# Patient Record
Sex: FEMALE | Race: WHITE | NOT HISPANIC OR LATINO | Employment: UNEMPLOYED | URBAN - NONMETROPOLITAN AREA
[De-identification: names, ages, dates, MRNs, and addresses within clinical notes are randomized per-mention and may not be internally consistent; named-entity substitution may affect disease eponyms.]

---

## 2018-02-12 PROCEDURE — 87624 HPV HI-RISK TYP POOLED RSLT: CPT | Performed by: NURSE PRACTITIONER

## 2018-02-12 PROCEDURE — 88142 CYTOPATH C/V THIN LAYER: CPT | Performed by: NURSE PRACTITIONER

## 2018-02-13 ENCOUNTER — LAB REQUISITION (OUTPATIENT)
Dept: LAB | Facility: HOSPITAL | Age: 33
End: 2018-02-13

## 2018-02-13 DIAGNOSIS — Z01.419 ENCOUNTER FOR GYNECOLOGICAL EXAMINATION WITHOUT ABNORMAL FINDING: ICD-10-CM

## 2018-02-27 LAB
LAB AP CASE REPORT: NORMAL
LAB AP GYN ADDITIONAL INFORMATION: NORMAL
LAB AP GYN OTHER FINDINGS: NORMAL
LAB AP LMP: NORMAL
Lab: NORMAL
PATH INTERP SPEC-IMP: NORMAL
STAT OF ADQ CVX/VAG CYTO-IMP: NORMAL

## 2018-03-01 LAB — HPV I/H RISK 4 DNA CVX QL PROBE+SIG AMP: NEGATIVE

## 2018-03-07 ENCOUNTER — OFFICE VISIT (OUTPATIENT)
Dept: OBSTETRICS AND GYNECOLOGY | Facility: CLINIC | Age: 33
End: 2018-03-07

## 2018-03-07 ENCOUNTER — LAB (OUTPATIENT)
Dept: LAB | Facility: HOSPITAL | Age: 33
End: 2018-03-07

## 2018-03-07 VITALS
BODY MASS INDEX: 43.07 KG/M2 | DIASTOLIC BLOOD PRESSURE: 86 MMHG | WEIGHT: 268 LBS | SYSTOLIC BLOOD PRESSURE: 138 MMHG | HEIGHT: 66 IN

## 2018-03-07 DIAGNOSIS — E66.01 OBESITY, MORBID, BMI 40.0-49.9 (HCC): ICD-10-CM

## 2018-03-07 DIAGNOSIS — Z30.09 STERILIZATION CONSULT: Primary | ICD-10-CM

## 2018-03-07 LAB — TSH SERPL DL<=0.05 MIU/L-ACNC: 1.96 MIU/ML (ref 0.46–4.68)

## 2018-03-07 PROCEDURE — 36415 COLL VENOUS BLD VENIPUNCTURE: CPT

## 2018-03-07 PROCEDURE — 84443 ASSAY THYROID STIM HORMONE: CPT

## 2018-03-07 PROCEDURE — 99203 OFFICE O/P NEW LOW 30 MIN: CPT | Performed by: OBSTETRICS & GYNECOLOGY

## 2018-03-07 NOTE — PROGRESS NOTES
Subjective   Mackenzie Neff is a 32 y.o. female.     HPI Comments: Patient presents today for a tubal consult, requests permanent sterilization.    Reiterated that sterilization should be considered permanent. Discussed risks for failure, bleeding, infection and possible damage to abdominal/pelvic structures.  Patient would like to proceed with Essure. Discussed need to have patient bring insurance card before we are able to sign paperwork.  Patient relates that she was recently seen at the [facility] department for plan B and Depo shot after unprotected intercourse 2 nights before.  LMP in , Depo was .  Discussed Phentermine with R/B/A. Counseled patient that I will not prescribe for second month if she does not lose weight as that would be an indication that risks outweigh benefits. Counseled patient that she will need to stop taking this medication for at least 2 wk before her sterilization.      Contraception   Pertinent negatives include no chest pain or coughing.   Weight Loss   Pertinent negatives include no chest pain or coughing.       The following portions of the patient's history were reviewed and updated as appropriate: allergies, current medications, past family history, past medical history, past social history, past surgical history and problem list.      Review of Systems   Constitutional: Positive for weight loss. Negative for unexpected weight change.   Respiratory: Negative for cough and shortness of breath.    Cardiovascular: Negative for chest pain and palpitations.       Objective   Physical Exam   Constitutional: She is oriented to person, place, and time. She appears well-developed and well-nourished. No distress.   HENT:   Head: Normocephalic and atraumatic.   Right Ear: External ear normal.   Left Ear: External ear normal.   Nose: Nose normal.   Mouth/Throat: Oropharynx is clear and moist.   Neurological: She is alert and oriented to person, place, and time.   Skin: She is  not diaphoretic.   Psychiatric: She has a normal mood and affect. Her behavior is normal. Judgment and thought content normal.   Vitals reviewed.      Assessment/Plan   Mackenzie was seen today for contraception and weight loss.    Diagnoses and all orders for this visit:    Sterilization consult    Obesity, morbid, BMI 40.0-49.9  -     TSH; Future       Check TSH  Phentermine 37.5 #30 with no refills script given  F/u 1 mo  10 lb weight loss goal

## 2018-03-14 ENCOUNTER — TELEPHONE (OUTPATIENT)
Dept: OBSTETRICS AND GYNECOLOGY | Facility: CLINIC | Age: 33
End: 2018-03-14

## 2018-03-14 NOTE — TELEPHONE ENCOUNTER
----- Message from Mackenzie Del Valle sent at 3/14/2018 11:47 AM CDT -----  Contact: [phone]  Pt was wondering about lab results from lab work from last week. Please call, thanks!  I called this patient and informed her that her TSH came back normal.

## 2018-11-28 ENCOUNTER — OFFICE VISIT (OUTPATIENT)
Dept: FAMILY MEDICINE CLINIC | Facility: CLINIC | Age: 33
End: 2018-11-28

## 2018-11-28 VITALS
DIASTOLIC BLOOD PRESSURE: 62 MMHG | HEIGHT: 66 IN | WEIGHT: 251.06 LBS | HEART RATE: 91 BPM | SYSTOLIC BLOOD PRESSURE: 120 MMHG | OXYGEN SATURATION: 98 % | BODY MASS INDEX: 40.35 KG/M2

## 2018-11-28 DIAGNOSIS — Z00.01 ANNUAL VISIT FOR GENERAL ADULT MEDICAL EXAMINATION WITH ABNORMAL FINDINGS: Primary | ICD-10-CM

## 2018-11-28 DIAGNOSIS — F90.0 ATTENTION DEFICIT HYPERACTIVITY DISORDER (ADHD), PREDOMINANTLY INATTENTIVE TYPE: ICD-10-CM

## 2018-11-28 PROCEDURE — 99203 OFFICE O/P NEW LOW 30 MIN: CPT | Performed by: FAMILY MEDICINE

## 2018-11-28 NOTE — PROGRESS NOTES
Subjective:     Mackenzie Neff is a 33 y.o. female who presents for initial evaluation  for ADHD    Patient is here for evaluation of ADHD and initiation of treatment. She reports that she has had poor sleep quality with difficulty falling asleep and staying asleep. She reports that even though she is up early she still is frequently late for work because of difficulty staying on task. She will frequently start a task and then become distracted with different tasks including at home and with work. She reports significant guilt and poor mood because her children will tell her information and she will have difficulty retaining and they have to repeat themselves. The severity and affect on her personal life is more significant after the separation from her spouse in June/July as she is now raising three children, working, and going to school and having difficulty managing all her responsibilities.  She reports that she was previously diagnosed with ADHD at the age of [age] by Dr. Olmstead in [facility] and was treated with ritalin with varying response to treatment in childhood. She reports that she has been diagnosed with Bipolar disorder but does not agree with that diagnosis. She has previously seen Caitie in the past.   Patient filled out a PHQ9, ADHD adult self assessment scale, and mood disorder screening form in office. Her depression and ADHD screen were significantly positive, and mood disorder screen was negative. Discussed with patient at length about importance of full evaluation with a psychologist for initiation of proper treatment. Patient experienced significant concern about initiation of any medication or administration of any vaccination.        Past Medical Hx:  Past Medical History:   Diagnosis Date   • Acneiform eruption    • Amenorrhea    • Anxiety    • Bipolar disorder (CMS/HCC)    • Tobacco dependence syndrome    • URI (upper respiratory infection)        Past Surgical Hx:  Past  Surgical History:   Procedure Laterality Date   • CERVIX SURGERY  2005    Revision of cervix (2)    incompetent cervix, cerclage    • DILATATION AND CURETTAGE     • PAP SMEAR      benign cellular changes reactive cellular changes. negative for intraepithelial lesion or malignancy       Health Maintenance:  Health Maintenance   Topic Date Due   • ANNUAL PHYSICAL  06/26/1989   • PNEUMOCOCCAL VACCINE (19-64 MEDIUM RISK) (1 of 1 - PPSV23) 06/26/2005   • TDAP/TD VACCINES (1 - Tdap) 06/26/2005   • INFLUENZA VACCINE  08/01/2018   • PAP SMEAR  02/12/2021       Current Meds:  No current outpatient medications on file.    Allergies:  Patient has no known allergies.    Family Hx:  Family History   Problem Relation Age of Onset   • Hypertension Mother    • Hypertension Father    • Lung cancer Paternal Grandfather    • Bipolar disorder Other    • Diabetes Other         Social History:  Social History     Socioeconomic History   • Marital status:      Spouse name: Not on file   • Number of children: Not on file   • Years of education: Not on file   • Highest education level: Not on file   Social Needs   • Financial resource strain: Not on file   • Food insecurity - worry: Not on file   • Food insecurity - inability: Not on file   • Transportation needs - medical: Not on file   • Transportation needs - non-medical: Not on file   Occupational History   • Not on file   Tobacco Use   • Smoking status: Current Every Day Smoker     Packs/day: 0.50     Years: 17.00     Pack years: 8.50     Types: Cigarettes   • Smokeless tobacco: Never Used   Substance and Sexual Activity   • Alcohol use: No   • Drug use: No   • Sexual activity: Yes     Partners: Male     Birth control/protection: Injection   Other Topics Concern   • Not on file   Social History Narrative   • Not on file       Review of Systems  Review of Systems   Constitutional: Negative for activity change, appetite change, fatigue and fever.   HENT: Negative for ear pain and  "sore throat.    Eyes: Negative for pain and visual disturbance.   Respiratory: Negative for cough and shortness of breath.    Cardiovascular: Negative for chest pain and palpitations.   Gastrointestinal: Negative for abdominal pain and nausea.   Endocrine: Negative for cold intolerance and heat intolerance.   Genitourinary: Negative for difficulty urinating and dysuria.   Musculoskeletal: Negative for arthralgias and gait problem.   Skin: Negative for color change and rash.   Neurological: Negative for dizziness, weakness and headaches.   Hematological: Negative for adenopathy. Does not bruise/bleed easily.   Psychiatric/Behavioral: Positive for decreased concentration and sleep disturbance. Negative for agitation, confusion, self-injury and suicidal ideas. The patient is nervous/anxious and is hyperactive.        Objective:     /62 (BP Location: Left arm, Patient Position: Sitting, Cuff Size: Large Adult)   Pulse 91   Ht 166.4 cm (65.5\")   Wt 114 kg (251 lb 1 oz)   SpO2 98%   BMI 41.14 kg/m²     Physical Exam   Constitutional: She is oriented to person, place, and time. She appears well-developed and well-nourished.   HENT:   Head: Normocephalic and atraumatic.   Eyes: Conjunctivae, EOM and lids are normal. Pupils are equal, round, and reactive to light.   Neck: Normal range of motion. Neck supple.   Cardiovascular: Normal rate, regular rhythm and normal heart sounds. Exam reveals no gallop and no friction rub.   No murmur heard.  Pulmonary/Chest: Effort normal and breath sounds normal.   Abdominal: Soft. Normal appearance and bowel sounds are normal. There is no tenderness.   Musculoskeletal: Normal range of motion.   Neurological: She is alert and oriented to person, place, and time.   Skin: Skin is warm, dry and intact.   Psychiatric: Judgment and thought content normal. Her mood appears anxious. Her speech is rapid and/or pressured. Her speech is not tangential and not slurred. She is hyperactive. " She is not actively hallucinating. Cognition and memory are normal. She is attentive.        Visual Acuity Screening    Right eye Left eye Both eyes   Without correction: 20/20 20/20 20/20   With correction:        Assessment/Plan:     Mackenzie was seen today for adhd.    Diagnoses and all orders for this visit:    Annual visit for general adult medical examination with abnormal findings    Attention deficit hyperactivity disorder (ADHD), predominantly inattentive type  -     Ambulatory Referral to Psychology           Follow-up:     No Follow-up on file.      Goals     None          Preventative:    Vaccines Recommended at this visit:   Influenza, Pneumovax 23, TDaP/TD and patient refused flu shot, requested information about the pneumovax 23, and was given a hard script for TDAP to be taken to the health department.    Screenings Recommended at this visit:  No Screenings offered today. Patient is up to date on all screenings at this time.     Smoking Status:  Patient is current smoker. Patient is not interested in smoking cessation.    Alcohol Intake:  Patient does not drink    Patient's Body mass index is 41.14 kg/m². BMI is above normal parameters. Recommendations include: exercise counseling, nutrition counseling and will be addressed more in depth at further visits..        RISK SCORE: 3      Signature:  Rachel Benito MD [facility] PGY3  [place] [address]  Office: [phone]          This document has been electronically signed by Rachel Benito MD on November 29, 2018 10:32 AM

## 2018-11-28 NOTE — PATIENT INSTRUCTIONS
Insomnia  Insomnia is a sleep disorder that makes it difficult to fall asleep or to stay asleep. Insomnia can cause tiredness (fatigue), low energy, difficulty concentrating, mood swings, and poor performance at work or school.  There are three different ways to classify insomnia:  · Difficulty falling asleep.  · Difficulty staying asleep.  · Waking up too early in the morning.    Any type of insomnia can be long-term (chronic) or short-term (acute). Both are common. Short-term insomnia usually lasts for three months or less. Chronic insomnia occurs at least three times a week for longer than three months.  What are the causes?  Insomnia may be caused by another condition, situation, or substance, such as:  · Anxiety.  · Certain medicines.  · Gastroesophageal reflux disease (GERD) or other gastrointestinal conditions.  · Asthma or other breathing conditions.  · Restless legs syndrome, sleep apnea, or other sleep disorders.  · Chronic pain.  · Menopause. This may include hot flashes.  · Stroke.  · Abuse of alcohol, tobacco, or illegal drugs.  · Depression.  · Caffeine.  · Neurological disorders, such as Alzheimer disease.  · An overactive thyroid (hyperthyroidism).    The cause of insomnia may not be known.  What increases the risk?  Risk factors for insomnia include:  · Gender. Women are more commonly affected than men.  · Age. Insomnia is more common as you get older.  · Stress. This may involve your professional or personal life.  · Income. Insomnia is more common in people with lower income.  · Lack of exercise.  · Irregular work schedule or night shifts.  · Traveling between different time zones.    What are the signs or symptoms?  If you have insomnia, trouble falling asleep or trouble staying asleep is the main symptom. This may lead to other symptoms, such as:  · Feeling fatigued.  · Feeling nervous about going to sleep.  · Not feeling rested in the morning.  · Having trouble concentrating.  · Feeling  irritable, anxious, or depressed.    How is this treated?  Treatment for insomnia depends on the cause. If your insomnia is caused by an underlying condition, treatment will focus on addressing the condition. Treatment may also include:  · Medicines to help you sleep.  · Counseling or therapy.  · Lifestyle adjustments.    Follow these instructions at home:  · Take medicines only as directed by your health care provider.  · Keep regular sleeping and waking hours. Avoid naps.  · Keep a sleep diary to help you and your health care provider figure out what could be causing your insomnia. Include:  ? When you sleep.  ? When you wake up during the night.  ? How well you sleep.  ? How rested you feel the next day.  ? Any side effects of medicines you are taking.  ? What you eat and drink.  · Make your bedroom a comfortable place where it is easy to fall asleep:  ? Put up shades or special blackout curtains to block light from outside.  ? Use a white noise machine to block noise.  ? Keep the temperature cool.  · Exercise regularly as directed by your health care provider. Avoid exercising right before bedtime.  · Use relaxation techniques to manage stress. Ask your health care provider to suggest some techniques that may work well for you. These may include:  ? Breathing exercises.  ? Routines to release muscle tension.  ? Visualizing peaceful scenes.  · Cut back on alcohol, caffeinated beverages, and cigarettes, especially close to bedtime. These can disrupt your sleep.  · Do not overeat or eat spicy foods right before bedtime. This can lead to digestive discomfort that can make it hard for you to sleep.  · Limit screen use before bedtime. This includes:  ? Watching TV.  ? Using your smartphone, tablet, and computer.  · Stick to a routine. This can help you fall asleep faster. Try to do a quiet activity, brush your teeth, and go to bed at the same time each night.  · Get out of bed if you are still awake after 15 minutes  of trying to sleep. Keep the lights down, but try reading or doing a quiet activity. When you feel sleepy, go back to bed.  · Make sure that you drive carefully. Avoid driving if you feel very sleepy.  · Keep all follow-up appointments as directed by your health care provider. This is important.  Contact a health care provider if:  · You are tired throughout the day or have trouble in your daily routine due to sleepiness.  · You continue to have sleep problems or your sleep problems get worse.  Get help right away if:  · You have serious thoughts about hurting yourself or someone else.  This information is not intended to replace advice given to you by your health care provider. Make sure you discuss any questions you have with your health care provider.  Document Released: 12/15/2001 Document Revised: 05/19/2017 Document Reviewed: 09/18/2015  [employer] Interactive Patient Education © 2018 [employer]  Pneumococcal Polysaccharide Vaccine: What You Need to Know  1. Why get vaccinated?  Vaccination can protect older adults (and some children and younger adults) from pneumococcal disease.  Pneumococcal disease is caused by bacteria that can spread from person to person through close contact. It can cause ear infections, and it can also lead to more serious infections of the:  · Lungs (pneumonia),  · Blood (bacteremia), and  · Covering of the brain and spinal cord (meningitis). Meningitis can cause deafness and brain damage, and it can be fatal.    Anyone can get pneumococcal disease, but children under 2 years of age, people with certain medical conditions, adults over 65 years of age, and cigarette smokers are at the highest risk.  About 18,000 older adults die each year from pneumococcal disease in the [place].  Treatment of pneumococcal infections with penicillin and other drugs used to be more effective. But some strains of the disease have become resistant to these drugs. This makes prevention of the  disease, through vaccination, even more important.  2. Pneumococcal polysaccharide vaccine (PPSV23)  Pneumococcal polysaccharide vaccine (PPSV23) protects against 23 types of pneumococcal bacteria. It will not prevent all pneumococcal disease.  PPSV23 is recommended for:  · All adults 65 years of age and older,  · Anyone 2 through 64 years of age with certain long-term health problems,  · Anyone 2 through 64 years of age with a weakened immune system,  · Adults 19 through 64 years of age who smoke cigarettes or have asthma.    Most people need only one dose of PPSV. A second dose is recommended for certain high-risk groups. People 65 and older should get a dose even if they have gotten one or more doses of the vaccine before they turned 65.  Your healthcare provider can give you more information about these recommendations.  Most healthy adults develop protection within 2 to 3 weeks of getting the shot.  3. Some people should not get this vaccine  · Anyone who has had a life-threatening allergic reaction to PPSV should not get another dose.  · Anyone who has a severe allergy to any component of PPSV should not receive it. Tell your provider if you have any severe allergies.  · Anyone who is moderately or severely ill when the shot is scheduled may be asked to wait until they recover before getting the vaccine. Someone with a mild illness can usually be vaccinated.  · Children less than 2 years of age should not receive this vaccine.  · There is no evidence that PPSV is harmful to either a pregnant woman or to her fetus. However, as a precaution, women who need the vaccine should be vaccinated before becoming pregnant, if possible.  4. Risks of a vaccine reaction  With any medicine, including vaccines, there is a chance of side effects. These are usually mild and go away on their own, but serious reactions are also possible.  About half of people who get PPSV have mild side effects, such as redness or pain where the  shot is given, which go away within about two days.  Less than 1 out of 100 people develop a fever, muscle aches, or more severe local reactions.  Problems that could happen after any vaccine:  · People sometimes faint after a medical procedure, including vaccination. Sitting or lying down for about 15 minutes can help prevent fainting, and injuries caused by a fall. Tell your doctor if you feel dizzy, or have vision changes or ringing in the ears.  · Some people get severe pain in the shoulder and have difficulty moving the arm where a shot was given. This happens very rarely.  · Any medication can cause a severe allergic reaction. Such reactions from a vaccine are very rare, estimated at about 1 in a million doses, and would happen within a few minutes to a few hours after the vaccination.  As with any medicine, there is a very remote chance of a vaccine causing a serious injury or death.  The safety of vaccines is always being monitored. For more information, visit: www.cdc.gov/vaccinesafety/  5. What if there is a serious reaction?  What should I look for?  Look for anything that concerns you, such as signs of a severe allergic reaction, very high fever, or unusual behavior.  Signs of a severe allergic reaction can include hives, swelling of the face and throat, difficulty breathing, a fast heartbeat, dizziness, and weakness. These would usually start a few minutes to a few hours after the vaccination.  What should I do?  If you think it is a severe allergic reaction or other emergency that can't wait, call 9-1-1 or get to the nearest hospital. Otherwise, call your doctor.  Afterward, the reaction should be reported to the Vaccine Adverse Event Reporting System (VAERS). Your doctor might file this report, or you can do it yourself through the VAERS web site at www.vaers.hhs.gov, or by calling 1-335.887.7024.  VAERS does not give medical advice.  6. How can I learn more?  · Ask your doctor. He or she can give you  the vaccine package insert or suggest other sources of information.  · Call your local or state health department.  · Contact the Centers for Disease Control and Prevention (CDC):  ? Call 1-933.632.2884 (6-450-VVG-INFO) or  ? Visit CDC's website at www.cdc.gov/vaccines  CDC Pneumococcal Polysaccharide Vaccine VIS (4/24/15)  This information is not intended to replace advice given to you by your health care provider. Make sure you discuss any questions you have with your health care provider.  Document Released: 10/15/2007 Document Revised: 09/07/2017 Document Reviewed: 09/07/2017  [employer] Interactive Patient Education © 2017 [employer]  Pneumococcal Conjugate Vaccine (PCV13) What You Need to Know  1. Why get vaccinated?  Vaccination can protect both children and adults from pneumococcal disease.  Pneumococcal disease is caused by bacteria that can spread from person to person through close contact. It can cause ear infections, and it can also lead to more serious infections of the:  · Lungs (pneumonia),  · Blood (bacteremia), and  · Covering of the brain and spinal cord (meningitis).    Pneumococcal pneumonia is most common among adults. Pneumococcal meningitis can cause deafness and brain damage, and it kills about 1 child in 10 who get it.  Anyone can get pneumococcal disease, but children under 2 years of age and adults 65 years and older, people with certain medical conditions, and cigarette smokers are at the highest risk.  Before there was a vaccine, the [place] saw:  · more than 700 cases of meningitis,  · about 13,000 blood infections,  · about 5 million ear infections, and  · about 200 deaths    in children under 5 each year from pneumococcal disease. Since vaccine became available, severe pneumococcal disease in these children has fallen by 88%.  About 18,000 older adults die of pneumococcal disease each year in the [place].  Treatment of pneumococcal infections with penicillin and other  drugs is not as effective as it used to be, because some strains of the disease have become resistant to these drugs. This makes prevention of the disease, through vaccination, even more important.  2. PCV13 vaccine  Pneumococcal conjugate vaccine (called PCV13) protects against 13 types of pneumococcal bacteria.  PCV13 is routinely given to children at 2, 4, 6, and 12-15 months of age. It is also recommended for children and adults 2 to 64 years of age with certain health conditions, and for all adults 65 years of age and older. Your doctor can give you details.  3. Some people should not get this vaccine  Anyone who has ever had a life-threatening allergic reaction to a dose of this vaccine, to an earlier pneumococcal vaccine called PCV7, or to any vaccine containing diphtheria toxoid (for example, DTaP), should not get PCV13.  Anyone with a severe allergy to any component of PCV13 should not get the vaccine. Tell your doctor if the person being vaccinated has any severe allergies.  If the person scheduled for vaccination is not feeling well, your healthcare provider might decide to reschedule the shot on another day.  4. Risks of a vaccine reaction  With any medicine, including vaccines, there is a chance of reactions. These are usually mild and go away on their own, but serious reactions are also possible.  Problems reported following PCV13 varied by age and dose in the series. The most common problems reported among children were:  · About half became drowsy after the shot, had a temporary loss of appetite, or had redness or tenderness where the shot was given.  · About 1 out of 3 had swelling where the shot was given.  · About 1 out of 3 had a mild fever, and about 1 in 20 had a fever over 102.2°F.  · Up to about 8 out of 10 became fussy or irritable.    Adults have reported pain, redness, and swelling where the shot was given; also mild fever, fatigue, headache, chills, or muscle pain.  Young children who get  PCV13 along with inactivated flu vaccine at the same time may be at increased risk for seizures caused by fever. Ask your doctor for more information.  Problems that could happen after any vaccine:  · People sometimes faint after a medical procedure, including vaccination. Sitting or lying down for about 15 minutes can help prevent fainting, and injuries caused by a fall. Tell your doctor if you feel dizzy, or have vision changes or ringing in the ears.  · Some older children and adults get severe pain in the shoulder and have difficulty moving the arm where a shot was given. This happens very rarely.  · Any medication can cause a severe allergic reaction. Such reactions from a vaccine are very rare, estimated at about 1 in a million doses, and would happen within a few minutes to a few hours after the vaccination.  As with any medicine, there is a very small chance of a vaccine causing a serious injury or death.  The safety of vaccines is always being monitored. For more information, visit: www.cdc.gov/vaccinesafety/  5. What if there is a serious reaction?  What should I look for?  Look for anything that concerns you, such as signs of a severe allergic reaction, very high fever, or unusual behavior.  Signs of a severe allergic reaction can include hives, swelling of the face and throat, difficulty breathing, a fast heartbeat, dizziness, and weakness--usually within a few minutes to a few hours after the vaccination.  What should I do?  · If you think it is a severe allergic reaction or other emergency that can’t wait, call 9-1-1 or get the person to the nearest hospital. Otherwise, call your doctor.  · Reactions should be reported to the Vaccine Adverse Event Reporting System (VAERS). Your doctor should file this report, or you can do it yourself through the VAERS web site at www.vaers.[facility].gov, or by calling 1-502.276.3613.  ? VAERS does not give medical advice.  6. The National Vaccine Injury Compensation  Program  The National Vaccine Injury Compensation Program (VICP) is a federal program that was created to compensate people who may have been injured by certain vaccines.  Persons who believe they may have been injured by a vaccine can learn about the program and about filing a claim by calling 1-664.735.9697 or visiting the VICP website at www.[facility].gov/vaccinecompensation. There is a time limit to file a claim for compensation.  7. How can I learn more?  · Ask your healthcare provider. He or she can give you the vaccine package insert or suggest other sources of information.  · Call your local or state health department.  · Contact the Centers for Disease Control and Prevention (CDC):  ? Call 1-855.413.6460 (5-587-KSW-INFO) or  ? Visit CDC's website at www.cdc.gov/vaccines  Vaccine Information Statement, PCV13 Vaccine (11/05/2015)  This information is not intended to replace advice given to you by your health care provider. Make sure you discuss any questions you have with your health care provider.  Document Released: 10/15/2007 Document Revised: 09/07/2017 Document Reviewed: 09/07/2017  [employer] Interactive Patient Education © 2017 [employer]

## 2018-12-29 ENCOUNTER — APPOINTMENT (OUTPATIENT)
Dept: CT IMAGING | Facility: HOSPITAL | Age: 33
End: 2018-12-29

## 2018-12-29 ENCOUNTER — HOSPITAL ENCOUNTER (EMERGENCY)
Facility: HOSPITAL | Age: 33
Discharge: HOME | End: 2018-12-29
Attending: EMERGENCY MEDICINE | Admitting: EMERGENCY MEDICINE

## 2018-12-29 VITALS
HEIGHT: 66 IN | BODY MASS INDEX: 39.86 KG/M2 | TEMPERATURE: 97.7 F | RESPIRATION RATE: 18 BRPM | SYSTOLIC BLOOD PRESSURE: 107 MMHG | HEART RATE: 82 BPM | OXYGEN SATURATION: 98 % | WEIGHT: 248 LBS | DIASTOLIC BLOOD PRESSURE: 61 MMHG

## 2018-12-29 DIAGNOSIS — R10.84 GENERALIZED ABDOMINAL PAIN: Primary | ICD-10-CM

## 2018-12-29 LAB
ALBUMIN SERPL-MCNC: 4.1 G/DL (ref 3.4–4.8)
ALBUMIN/GLOB SERPL: 1.4 G/DL (ref 1.1–1.8)
ALP SERPL-CCNC: 85 U/L (ref 38–126)
ALT SERPL W P-5'-P-CCNC: 21 U/L (ref 9–52)
ANION GAP SERPL CALCULATED.3IONS-SCNC: 9 MMOL/L (ref 5–15)
AST SERPL-CCNC: 22 U/L (ref 14–36)
B-HCG UR QL: NEGATIVE
BACTERIA UR QL AUTO: ABNORMAL /HPF
BASOPHILS # BLD AUTO: 0.02 10*3/MM3 (ref 0–0.2)
BASOPHILS NFR BLD AUTO: 0.2 % (ref 0–2)
BILIRUB SERPL-MCNC: 0.3 MG/DL (ref 0.2–1.3)
BILIRUB UR QL STRIP: NEGATIVE
BUN BLD-MCNC: 19 MG/DL (ref 7–21)
BUN/CREAT SERPL: 27.9 (ref 7–25)
CALCIUM SPEC-SCNC: 9.2 MG/DL (ref 8.4–10.2)
CHLORIDE SERPL-SCNC: 103 MMOL/L (ref 95–110)
CLARITY UR: ABNORMAL
CO2 SERPL-SCNC: 27 MMOL/L (ref 22–31)
COLOR UR: YELLOW
CREAT BLD-MCNC: 0.68 MG/DL (ref 0.5–1)
DEPRECATED RDW RBC AUTO: 40.6 FL (ref 36.4–46.3)
EOSINOPHIL # BLD AUTO: 0.18 10*3/MM3 (ref 0–0.7)
EOSINOPHIL NFR BLD AUTO: 1.8 % (ref 0–7)
ERYTHROCYTE [DISTWIDTH] IN BLOOD BY AUTOMATED COUNT: 12.9 % (ref 11.5–14.5)
GFR SERPL CREATININE-BSD FRML MDRD: 100 ML/MIN/1.73 (ref 64–149)
GLOBULIN UR ELPH-MCNC: 3 GM/DL (ref 2.3–3.5)
GLUCOSE BLD-MCNC: 105 MG/DL (ref 60–100)
GLUCOSE UR STRIP-MCNC: NEGATIVE MG/DL
HCT VFR BLD AUTO: 37.9 % (ref 35–45)
HGB BLD-MCNC: 13.4 G/DL (ref 12–15.5)
HGB UR QL STRIP.AUTO: ABNORMAL
HOLD SPECIMEN: NORMAL
HYALINE CASTS UR QL AUTO: ABNORMAL /LPF
IMM GRANULOCYTES # BLD AUTO: 0.03 10*3/MM3 (ref 0–0.02)
IMM GRANULOCYTES NFR BLD AUTO: 0.3 % (ref 0–0.5)
KETONES UR QL STRIP: NEGATIVE
LEUKOCYTE ESTERASE UR QL STRIP.AUTO: NEGATIVE
LYMPHOCYTES # BLD AUTO: 2.3 10*3/MM3 (ref 0.6–4.2)
LYMPHOCYTES NFR BLD AUTO: 22.9 % (ref 10–50)
MCH RBC QN AUTO: 30.3 PG (ref 26.5–34)
MCHC RBC AUTO-ENTMCNC: 35.4 G/DL (ref 31.4–36)
MCV RBC AUTO: 85.7 FL (ref 80–98)
MONOCYTES # BLD AUTO: 0.74 10*3/MM3 (ref 0–0.9)
MONOCYTES NFR BLD AUTO: 7.4 % (ref 0–12)
MUCOUS THREADS URNS QL MICRO: ABNORMAL /HPF
NEUTROPHILS # BLD AUTO: 6.79 10*3/MM3 (ref 2–8.6)
NEUTROPHILS NFR BLD AUTO: 67.4 % (ref 37–80)
NITRITE UR QL STRIP: NEGATIVE
PH UR STRIP.AUTO: 5.5 [PH] (ref 5–9)
PLATELET # BLD AUTO: 210 10*3/MM3 (ref 150–450)
PMV BLD AUTO: 10.4 FL (ref 8–12)
POTASSIUM BLD-SCNC: 4.2 MMOL/L (ref 3.5–5.1)
PROT SERPL-MCNC: 7.1 G/DL (ref 6.3–8.6)
PROT UR QL STRIP: NEGATIVE
RBC # BLD AUTO: 4.42 10*6/MM3 (ref 3.77–5.16)
RBC # UR: ABNORMAL /HPF
REF LAB TEST METHOD: ABNORMAL
SODIUM BLD-SCNC: 139 MMOL/L (ref 137–145)
SP GR UR STRIP: 1.03 (ref 1–1.03)
SQUAMOUS #/AREA URNS HPF: ABNORMAL /HPF
UROBILINOGEN UR QL STRIP: ABNORMAL
WBC NRBC COR # BLD: 10.06 10*3/MM3 (ref 3.2–9.8)
WBC UR QL AUTO: ABNORMAL /HPF
WHOLE BLOOD HOLD SPECIMEN: NORMAL

## 2018-12-29 PROCEDURE — 81025 URINE PREGNANCY TEST: CPT | Performed by: EMERGENCY MEDICINE

## 2018-12-29 PROCEDURE — 74176 CT ABD & PELVIS W/O CONTRAST: CPT

## 2018-12-29 PROCEDURE — 80053 COMPREHEN METABOLIC PANEL: CPT | Performed by: PHYSICIAN ASSISTANT

## 2018-12-29 PROCEDURE — 85025 COMPLETE CBC W/AUTO DIFF WBC: CPT | Performed by: PHYSICIAN ASSISTANT

## 2018-12-29 PROCEDURE — 99284 EMERGENCY DEPT VISIT MOD MDM: CPT

## 2018-12-29 PROCEDURE — 87086 URINE CULTURE/COLONY COUNT: CPT | Performed by: EMERGENCY MEDICINE

## 2018-12-29 PROCEDURE — 81001 URINALYSIS AUTO W/SCOPE: CPT | Performed by: EMERGENCY MEDICINE

## 2018-12-29 RX ORDER — ALUMINA, MAGNESIA, AND SIMETHICONE 2400; 2400; 240 MG/30ML; MG/30ML; MG/30ML
15 SUSPENSION ORAL ONCE
Status: COMPLETED | OUTPATIENT
Start: 2018-12-29 | End: 2018-12-29

## 2018-12-29 RX ORDER — ACETAMINOPHEN 500 MG
1000 TABLET ORAL ONCE
Status: COMPLETED | OUTPATIENT
Start: 2018-12-29 | End: 2018-12-29

## 2018-12-29 RX ORDER — HYDROCODONE BITARTRATE AND ACETAMINOPHEN 5; 325 MG/1; MG/1
1 TABLET ORAL ONCE
Status: DISCONTINUED | OUTPATIENT
Start: 2018-12-29 | End: 2018-12-29

## 2018-12-29 RX ORDER — ACETAMINOPHEN 500 MG
1000 TABLET ORAL EVERY 6 HOURS PRN
Qty: 56 TABLET | Refills: 0 | Status: SHIPPED | OUTPATIENT
Start: 2018-12-29 | End: 2019-01-05

## 2018-12-29 RX ADMIN — ACETAMINOPHEN 1000 MG: 500 TABLET ORAL at 09:56

## 2018-12-29 RX ADMIN — ALUMINUM HYDROXIDE, MAGNESIUM HYDROXIDE, AND DIMETHICONE 15 ML: 400; 400; 40 SUSPENSION ORAL at 13:39

## 2018-12-29 RX ADMIN — LIDOCAINE HYDROCHLORIDE 15 ML: 20 SOLUTION ORAL; TOPICAL at 13:39

## 2018-12-30 LAB — BACTERIA SPEC AEROBE CULT: NORMAL

## 2019-01-30 ENCOUNTER — OFFICE VISIT (OUTPATIENT)
Dept: FAMILY MEDICINE CLINIC | Facility: CLINIC | Age: 34
End: 2019-01-30

## 2019-01-30 VITALS
HEART RATE: 81 BPM | HEIGHT: 66 IN | SYSTOLIC BLOOD PRESSURE: 120 MMHG | BODY MASS INDEX: 40.18 KG/M2 | WEIGHT: 250 LBS | OXYGEN SATURATION: 99 % | DIASTOLIC BLOOD PRESSURE: 82 MMHG

## 2019-01-30 DIAGNOSIS — E66.01 CLASS 3 SEVERE OBESITY WITHOUT SERIOUS COMORBIDITY WITH BODY MASS INDEX (BMI) OF 40.0 TO 44.9 IN ADULT, UNSPECIFIED OBESITY TYPE (HCC): Primary | ICD-10-CM

## 2019-01-30 DIAGNOSIS — T14.8XXA BRUISE: ICD-10-CM

## 2019-01-30 PROCEDURE — 99213 OFFICE O/P EST LOW 20 MIN: CPT | Performed by: FAMILY MEDICINE

## 2019-01-30 RX ORDER — NAPROXEN 500 MG/1
TABLET ORAL
Qty: 60 TABLET | Refills: 2 | Status: ON HOLD | OUTPATIENT
Start: 2019-01-30 | End: 2020-05-04
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
## 2019-06-08 ENCOUNTER — APPOINTMENT (OUTPATIENT)
Dept: CT IMAGING | Facility: HOSPITAL | Age: 34
End: 2019-06-08

## 2019-06-08 ENCOUNTER — HOSPITAL ENCOUNTER (EMERGENCY)
Facility: HOSPITAL | Age: 34
Discharge: HOME | End: 2019-06-08
Attending: FAMILY MEDICINE | Admitting: FAMILY MEDICINE

## 2019-06-08 VITALS
OXYGEN SATURATION: 99 % | HEART RATE: 89 BPM | HEIGHT: 66 IN | BODY MASS INDEX: 33.75 KG/M2 | TEMPERATURE: 97.8 F | DIASTOLIC BLOOD PRESSURE: 59 MMHG | RESPIRATION RATE: 18 BRPM | SYSTOLIC BLOOD PRESSURE: 114 MMHG | WEIGHT: 210 LBS

## 2019-06-08 DIAGNOSIS — R11.0 NAUSEA: ICD-10-CM

## 2019-06-08 DIAGNOSIS — R42 DIZZINESS: Primary | ICD-10-CM

## 2019-06-08 LAB
ALBUMIN SERPL-MCNC: 4.2 G/DL (ref 3.5–5.2)
ALBUMIN/GLOB SERPL: 1.3 G/DL
ALP SERPL-CCNC: 80 U/L (ref 39–117)
ALT SERPL W P-5'-P-CCNC: 16 U/L (ref 1–33)
AMPHET+METHAMPHET UR QL: POSITIVE
ANION GAP SERPL CALCULATED.3IONS-SCNC: 11 MMOL/L
AST SERPL-CCNC: 14 U/L (ref 1–32)
B-HCG UR QL: NEGATIVE
BACTERIA UR QL AUTO: ABNORMAL /HPF
BARBITURATES UR QL SCN: NEGATIVE
BASOPHILS # BLD AUTO: 0.01 10*3/MM3 (ref 0–0.2)
BASOPHILS NFR BLD AUTO: 0.1 % (ref 0–1.5)
BENZODIAZ UR QL SCN: NEGATIVE
BILIRUB SERPL-MCNC: 0.5 MG/DL (ref 0.2–1.2)
BILIRUB UR QL STRIP: NEGATIVE
BUN BLD-MCNC: 13 MG/DL (ref 6–20)
BUN/CREAT SERPL: 16.9 (ref 7–25)
CALCIUM SPEC-SCNC: 9.3 MG/DL (ref 8.6–10.5)
CANNABINOIDS SERPL QL: NEGATIVE
CHLORIDE SERPL-SCNC: 102 MMOL/L (ref 98–107)
CLARITY UR: CLEAR
CO2 SERPL-SCNC: 26 MMOL/L (ref 22–29)
COCAINE UR QL: NEGATIVE
COLOR UR: YELLOW
CREAT BLD-MCNC: 0.77 MG/DL (ref 0.57–1)
D-DIMER, QUANTITATIVE (MAD,POW, STR): 469 NG/ML (FEU) (ref 0–470)
DEPRECATED RDW RBC AUTO: 39.6 FL (ref 37–54)
EOSINOPHIL # BLD AUTO: 0.19 10*3/MM3 (ref 0–0.4)
EOSINOPHIL NFR BLD AUTO: 2 % (ref 0.3–6.2)
ERYTHROCYTE [DISTWIDTH] IN BLOOD BY AUTOMATED COUNT: 12.8 % (ref 12.3–15.4)
GFR SERPL CREATININE-BSD FRML MDRD: 87 ML/MIN/1.73
GLOBULIN UR ELPH-MCNC: 3.3 GM/DL
GLUCOSE BLD-MCNC: 111 MG/DL (ref 65–99)
GLUCOSE BLDC GLUCOMTR-MCNC: 135 MG/DL (ref 70–130)
GLUCOSE UR STRIP-MCNC: NEGATIVE MG/DL
HBA1C MFR BLD: 4.5 % (ref 4.8–5.6)
HCT VFR BLD AUTO: 39.8 % (ref 34–46.6)
HGB BLD-MCNC: 13.8 G/DL (ref 12–15.9)
HGB UR QL STRIP.AUTO: NEGATIVE
HOLD SPECIMEN: NORMAL
HOLD SPECIMEN: NORMAL
HYALINE CASTS UR QL AUTO: ABNORMAL /LPF
IMM GRANULOCYTES # BLD AUTO: 0.02 10*3/MM3 (ref 0–0.05)
IMM GRANULOCYTES NFR BLD AUTO: 0.2 % (ref 0–0.5)
KETONES UR QL STRIP: NEGATIVE
LEUKOCYTE ESTERASE UR QL STRIP.AUTO: ABNORMAL
LIPASE SERPL-CCNC: 15 U/L (ref 13–60)
LYMPHOCYTES # BLD AUTO: 1.93 10*3/MM3 (ref 0.7–3.1)
LYMPHOCYTES NFR BLD AUTO: 20.5 % (ref 19.6–45.3)
MAGNESIUM SERPL-MCNC: 2.2 MG/DL (ref 1.6–2.6)
MCH RBC QN AUTO: 29.8 PG (ref 26.6–33)
MCHC RBC AUTO-ENTMCNC: 34.7 G/DL (ref 31.5–35.7)
MCV RBC AUTO: 86 FL (ref 79–97)
METHADONE UR QL SCN: NEGATIVE
MONOCYTES # BLD AUTO: 0.64 10*3/MM3 (ref 0.1–0.9)
MONOCYTES NFR BLD AUTO: 6.8 % (ref 5–12)
NEUTROPHILS # BLD AUTO: 6.61 10*3/MM3 (ref 1.7–7)
NEUTROPHILS NFR BLD AUTO: 70.4 % (ref 42.7–76)
NITRITE UR QL STRIP: NEGATIVE
NRBC BLD AUTO-RTO: 0 /100 WBC (ref 0–0.2)
OPIATES UR QL: NEGATIVE
OXYCODONE UR QL SCN: NEGATIVE
PH UR STRIP.AUTO: 8.5 [PH] (ref 5–9)
PLATELET # BLD AUTO: 228 10*3/MM3 (ref 140–450)
PMV BLD AUTO: 10.7 FL (ref 6–12)
POTASSIUM BLD-SCNC: 3.7 MMOL/L (ref 3.5–5.2)
PROT SERPL-MCNC: 7.5 G/DL (ref 6–8.5)
PROT UR QL STRIP: NEGATIVE
RBC # BLD AUTO: 4.63 10*6/MM3 (ref 3.77–5.28)
RBC # UR: ABNORMAL /HPF
REF LAB TEST METHOD: ABNORMAL
SODIUM BLD-SCNC: 139 MMOL/L (ref 136–145)
SP GR UR STRIP: 1.01 (ref 1–1.03)
SQUAMOUS #/AREA URNS HPF: ABNORMAL /HPF
TSH SERPL DL<=0.05 MIU/L-ACNC: 0.03 MIU/ML (ref 0.27–4.2)
UROBILINOGEN UR QL STRIP: ABNORMAL
WBC NRBC COR # BLD: 9.4 10*3/MM3 (ref 3.4–10.8)
WBC UR QL AUTO: ABNORMAL /HPF
WHOLE BLOOD HOLD SPECIMEN: NORMAL
WHOLE BLOOD HOLD SPECIMEN: NORMAL

## 2019-06-08 PROCEDURE — 80307 DRUG TEST PRSMV CHEM ANLYZR: CPT | Performed by: NURSE PRACTITIONER

## 2019-06-08 PROCEDURE — 83735 ASSAY OF MAGNESIUM: CPT | Performed by: NURSE PRACTITIONER

## 2019-06-08 PROCEDURE — 84436 ASSAY OF TOTAL THYROXINE: CPT | Performed by: NURSE PRACTITIONER

## 2019-06-08 PROCEDURE — 85379 FIBRIN DEGRADATION QUANT: CPT | Performed by: NURSE PRACTITIONER

## 2019-06-08 PROCEDURE — 83036 HEMOGLOBIN GLYCOSYLATED A1C: CPT | Performed by: NURSE PRACTITIONER

## 2019-06-08 PROCEDURE — 80050 GENERAL HEALTH PANEL: CPT | Performed by: FAMILY MEDICINE

## 2019-06-08 PROCEDURE — 81001 URINALYSIS AUTO W/SCOPE: CPT | Performed by: NURSE PRACTITIONER

## 2019-06-08 PROCEDURE — 81025 URINE PREGNANCY TEST: CPT | Performed by: NURSE PRACTITIONER

## 2019-06-08 PROCEDURE — 93005 ELECTROCARDIOGRAM TRACING: CPT | Performed by: NURSE PRACTITIONER

## 2019-06-08 PROCEDURE — 82962 GLUCOSE BLOOD TEST: CPT

## 2019-06-08 PROCEDURE — 99283 EMERGENCY DEPT VISIT LOW MDM: CPT

## 2019-06-08 PROCEDURE — 83690 ASSAY OF LIPASE: CPT | Performed by: NURSE PRACTITIONER

## 2019-06-08 PROCEDURE — 93010 ELECTROCARDIOGRAM REPORT: CPT | Performed by: INTERNAL MEDICINE

## 2019-06-08 RX ORDER — MECLIZINE HYDROCHLORIDE 25 MG/1
25 TABLET ORAL 4 TIMES DAILY PRN
Qty: 12 TABLET | Refills: 0 | Status: SHIPPED | OUTPATIENT
Start: 2019-06-08 | End: 2019-06-11

## 2019-06-08 RX ORDER — SODIUM CHLORIDE 0.9 % (FLUSH) 0.9 %
10 SYRINGE (ML) INJECTION AS NEEDED
Status: DISCONTINUED | OUTPATIENT
Start: 2019-06-08 | End: 2019-06-08 | Stop reason: HOSPADM

## 2019-06-08 RX ORDER — DEXTROAMPHETAMINE SACCHARATE, AMPHETAMINE ASPARTATE MONOHYDRATE, DEXTROAMPHETAMINE SULFATE AND AMPHETAMINE SULFATE 6.25; 6.25; 6.25; 6.25 MG/1; MG/1; MG/1; MG/1
CAPSULE, EXTENDED RELEASE ORAL
Refills: 0 | Status: ON HOLD | COMMUNITY
Start: 2019-03-13 | End: 2020-05-04

## 2019-06-08 RX ORDER — ONDANSETRON 4 MG/1
4 TABLET, ORALLY DISINTEGRATING ORAL EVERY 8 HOURS PRN
Qty: 15 TABLET | Refills: 0 | Status: SHIPPED | OUTPATIENT
Start: 2019-06-08 | End: 2020-04-25 | Stop reason: SDUPTHER

## 2019-06-08 RX ADMIN — SODIUM CHLORIDE 1000 ML: 900 INJECTION, SOLUTION INTRAVENOUS at 11:00

## 2019-06-08 NOTE — ED NOTES
"Pt refused IVF & requested IV to be removed. Pt stated that she did not need an IV or IVF, that she is not dehydrated. Pt states \"I drink plenty at work. I mainly drink energy drinks, but\". I explained to pt that consuming excessive amounts of energy drinks is not good for her.      Chilo Freeman RN  06/08/19 1107       Chilo Freeman RN  06/08/19 1111    "

## 2019-06-08 NOTE — ED NOTES
"Pt came up to this tech and asked if she could have any candy or something to eat because \"she feels terrible\" and \"is worried that it will trigger her anxiety.\" Pt informed that it would be best to wait until we check her blood sugar in the back before she ate/drank anything.      Jose Calle  06/08/19 [id number]    "

## 2019-06-08 NOTE — ED NOTES
"PT stated that she has been having problems regulating her blood sugar. Pt stated that \"she has been dealing with it herself usually but she has been unable to bring her blood sugar level up.\" Pt did not check her blood sugar at home but she stated that \"she has been feeling shaky, fatigued, dizzy, extreme thirst, and unable to comprehend much\". Pt also stated that \"when her blood sugar drops it triggers her anxiety. Pt also thinks that her crazy work hours is why her blood sugar has been bottoming out.\" Pt family has History of Diabetes.        LucJose BRIAN  06/08/19 [id number]    "

## 2019-06-08 NOTE — ED PROVIDER NOTES
"Subjective   33-year-old female in the emergency department today complaining of dizziness, low blood sugar and feeling of near syncope.  Patient reports she has blood sugar problems in the past but does not have a glucometer to check her numbers.  \"I know my body\".  Patient has a family history of her mother and grandfather having diabetes.        History provided by:  Patient  History limited by:  Age   used: No        Review of Systems   Constitutional: Negative for fatigue.   HENT: Negative for congestion.    Respiratory: Negative for shortness of breath.    Cardiovascular: Negative for chest pain and palpitations.   Gastrointestinal: Positive for nausea. Negative for abdominal pain, diarrhea and vomiting.   Genitourinary: Negative for dysuria and pelvic pain.   Musculoskeletal: Negative for arthralgias.   Skin: Negative for wound.   Allergic/Immunologic: Negative for immunocompromised state.   Neurological: Positive for dizziness, syncope (near ) and weakness.   Hematological: Negative for adenopathy.   Psychiatric/Behavioral: Positive for decreased concentration. Negative for confusion.   All other systems reviewed and are negative.      Past Medical History:   Diagnosis Date   • Acneiform eruption    • Amenorrhea    • Anxiety    • Bipolar disorder (CMS/HCC)    • Tobacco dependence syndrome    • URI (upper respiratory infection)        No Known Allergies    Past Surgical History:   Procedure Laterality Date   • CERVIX SURGERY  2005    Revision of cervix (2)    incompetent cervix, cerclage    • DILATATION AND CURETTAGE     • PAP SMEAR      benign cellular changes reactive cellular changes. negative for intraepithelial lesion or malignancy       Family History   Problem Relation Age of Onset   • Hypertension Mother    • Hypertension Father    • Lung cancer Paternal Grandfather    • Bipolar disorder Other    • Diabetes Other        Social History     Socioeconomic History   • Marital status: " Single     Spouse name: Not on file   • Number of children: Not on file   • Years of education: Not on file   • Highest education level: Not on file   Tobacco Use   • Smoking status: Current Every Day Smoker     Packs/day: 0.50     Years: 17.00     Pack years: 8.50     Types: Cigarettes   • Smokeless tobacco: Never Used   Substance and Sexual Activity   • Alcohol use: No   • Drug use: No   • Sexual activity: Yes     Partners: Male     Birth control/protection: Injection           Objective   Physical Exam   Constitutional: She is oriented to person, place, and time. Vital signs are normal. She appears well-developed and well-nourished.   HENT:   Head: Normocephalic.   Nose: Nose normal.   Eyes: Conjunctivae are normal. Pupils are equal, round, and reactive to light.   Neck: Normal range of motion.   Cardiovascular: Normal rate, regular rhythm and normal heart sounds.   Pulmonary/Chest: Effort normal and breath sounds normal.   Abdominal: Soft.   Musculoskeletal: Normal range of motion.   Neurological: She is alert and oriented to person, place, and time. GCS eye subscore is 4. GCS verbal subscore is 5. GCS motor subscore is 6.   Skin: Skin is warm and dry.   Psychiatric: She has a normal mood and affect.   Nursing note and vitals reviewed.      Procedures           ED Course      CT Head Without Contrast    (Results Pending)     Results for orders placed or performed during the hospital encounter of 06/08/19   Comprehensive Metabolic Panel   Result Value Ref Range    Glucose 111 (H) 65 - 99 mg/dL    BUN 13 6 - 20 mg/dL    Creatinine 0.77 0.57 - 1.00 mg/dL    Sodium 139 136 - 145 mmol/L    Potassium 3.7 3.5 - 5.2 mmol/L    Chloride 102 98 - 107 mmol/L    CO2 26.0 22.0 - 29.0 mmol/L    Calcium 9.3 8.6 - 10.5 mg/dL    Total Protein 7.5 6.0 - 8.5 g/dL    Albumin 4.20 3.50 - 5.20 g/dL    ALT (SGPT) 16 1 - 33 U/L    AST (SGOT) 14 1 - 32 U/L    Alkaline Phosphatase 80 39 - 117 U/L    Total Bilirubin 0.5 0.2 - 1.2 mg/dL     eGFR Non African Amer 87 >60 mL/min/1.73    Globulin 3.3 gm/dL    A/G Ratio 1.3 g/dL    BUN/Creatinine Ratio 16.9 7.0 - 25.0    Anion Gap 11.0 mmol/L   CBC Auto Differential   Result Value Ref Range    WBC 9.40 3.40 - 10.80 10*3/mm3    RBC 4.63 3.77 - 5.28 10*6/mm3    Hemoglobin 13.8 12.0 - 15.9 g/dL    Hematocrit 39.8 34.0 - 46.6 %    MCV 86.0 79.0 - 97.0 fL    MCH 29.8 26.6 - 33.0 pg    MCHC 34.7 31.5 - 35.7 g/dL    RDW 12.8 12.3 - 15.4 %    RDW-SD 39.6 37.0 - 54.0 fl    MPV 10.7 6.0 - 12.0 fL    Platelets 228 140 - 450 10*3/mm3    Neutrophil % 70.4 42.7 - 76.0 %    Lymphocyte % 20.5 19.6 - 45.3 %    Monocyte % 6.8 5.0 - 12.0 %    Eosinophil % 2.0 0.3 - 6.2 %    Basophil % 0.1 0.0 - 1.5 %    Immature Grans % 0.2 0.0 - 0.5 %    Neutrophils, Absolute 6.61 1.70 - 7.00 10*3/mm3    Lymphocytes, Absolute 1.93 0.70 - 3.10 10*3/mm3    Monocytes, Absolute 0.64 0.10 - 0.90 10*3/mm3    Eosinophils, Absolute 0.19 0.00 - 0.40 10*3/mm3    Basophils, Absolute 0.01 0.00 - 0.20 10*3/mm3    Immature Grans, Absolute 0.02 0.00 - 0.05 10*3/mm3    nRBC 0.0 0.0 - 0.2 /100 WBC   Urinalysis With Microscopic If Indicated (No Culture) - Urine, Clean Catch   Result Value Ref Range    Color, UA Yellow Yellow, Straw, Dark Yellow, Ines    Appearance, UA Clear Clear    pH, UA 8.5 5.0 - 9.0    Specific [place], UA 1.015 1.003 - 1.030    Glucose, UA Negative Negative    Ketones, UA Negative Negative    Bilirubin, UA Negative Negative    Blood, UA Negative Negative    Protein, UA Negative Negative    Leuk Esterase, UA Trace (A) Negative    Nitrite, UA Negative Negative    Urobilinogen, UA 0.2 E.U./dL 0.2 - 1.0 E.U./dL   D-dimer, Quantitative   Result Value Ref Range    D-Dimer, Quantitative 469 0 - 470 ng/mL (FEU)   Urine Drug Screen - Urine, Clean Catch   Result Value Ref Range    Amphet/Methamphet, Screen Positive (A) Negative    Barbiturates Screen, Urine Negative Negative    Benzodiazepine Screen, Urine Negative Negative    Cocaine Screen,  Urine Negative Negative    Opiate Screen Negative Negative    THC, Screen, Urine Negative Negative    Methadone Screen, Urine Negative Negative    Oxycodone Screen, Urine Negative Negative   Magnesium   Result Value Ref Range    Magnesium 2.2 1.6 - 2.6 mg/dL   TSH   Result Value Ref Range    TSH 0.025 (L) 0.270 - 4.200 mIU/mL   Lipase   Result Value Ref Range    Lipase 15 13 - 60 U/L   Pregnancy, Urine - Urine, Clean Catch   Result Value Ref Range    HCG, Urine QL Negative Negative   Hemoglobin A1c   Result Value Ref Range    Hemoglobin A1C 4.50 (L) 4.80 - 5.60 %   Urinalysis, Microscopic Only - Urine, Clean Catch   Result Value Ref Range    RBC, UA None Seen None Seen /HPF    WBC, UA 6-12 (A) None Seen, 0-2, 3-5 /HPF    Bacteria, UA None Seen None Seen /HPF    Squamous Epithelial Cells, UA Too Numerous to Count (A) None Seen, 0-2 /HPF    Hyaline Casts, UA None Seen None Seen /LPF    Methodology Automated Microscopy    POC Glucose Once   Result Value Ref Range    Glucose 135 (H) 70 - 130 mg/dL   Light Blue Top   Result Value Ref Range    Extra Tube hold for add-on    Green Top (Gel)   Result Value Ref Range    Extra Tube Hold for add-ons.    Lavender Top   Result Value Ref Range    Extra Tube hold for add-on    Gold Top - SST   Result Value Ref Range    Extra Tube Hold for add-ons.                  MDM  Number of Diagnoses or Management Options  Nausea and vomiting, intractability of vomiting not specified, unspecified vomiting type:         Final diagnoses:   Dizziness   Nausea            Chappell, Yao SMITH, APRN  06/08/19 1148

## 2019-06-10 LAB — T4 SERPL-MCNC: 6.8 UG/DL (ref 4.5–12)

## 2020-04-25 ENCOUNTER — HOSPITAL ENCOUNTER (EMERGENCY)
Facility: HOSPITAL | Age: 35
Discharge: HOME | End: 2020-04-25
Attending: EMERGENCY MEDICINE | Admitting: EMERGENCY MEDICINE

## 2020-04-25 VITALS
HEIGHT: 68 IN | WEIGHT: 215 LBS | TEMPERATURE: 98.2 F | BODY MASS INDEX: 32.58 KG/M2 | HEART RATE: 102 BPM | OXYGEN SATURATION: 94 % | SYSTOLIC BLOOD PRESSURE: 107 MMHG | DIASTOLIC BLOOD PRESSURE: 73 MMHG | RESPIRATION RATE: 17 BRPM

## 2020-04-25 DIAGNOSIS — B15.9 VIRAL HEPATITIS A WITHOUT HEPATIC COMA: Primary | ICD-10-CM

## 2020-04-25 LAB
ALBUMIN SERPL-MCNC: 4.1 G/DL (ref 3.5–5.2)
ALBUMIN/GLOB SERPL: 1.2 G/DL
ALP SERPL-CCNC: 203 U/L (ref 39–117)
ALT SERPL W P-5'-P-CCNC: 2116 U/L (ref 1–33)
ANION GAP SERPL CALCULATED.3IONS-SCNC: 14 MMOL/L (ref 5–15)
APAP SERPL-MCNC: <5 MCG/ML (ref 10–30)
AST SERPL-CCNC: 1962 U/L (ref 1–32)
BACTERIA UR QL AUTO: ABNORMAL /HPF
BASOPHILS # BLD AUTO: 0.03 10*3/MM3 (ref 0–0.2)
BASOPHILS NFR BLD AUTO: 0.6 % (ref 0–1.5)
BILIRUB SERPL-MCNC: 3 MG/DL (ref 0.2–1.2)
BILIRUB UR QL STRIP: ABNORMAL
BUN BLD-MCNC: 9 MG/DL (ref 6–20)
BUN/CREAT SERPL: 14.1 (ref 7–25)
CALCIUM SPEC-SCNC: 9.4 MG/DL (ref 8.6–10.5)
CHLORIDE SERPL-SCNC: 101 MMOL/L (ref 98–107)
CLARITY UR: CLEAR
CLUMPED PLATELETS: PRESENT
CO2 SERPL-SCNC: 27 MMOL/L (ref 22–29)
COLOR UR: ABNORMAL
CREAT BLD-MCNC: 0.64 MG/DL (ref 0.57–1)
DEPRECATED RDW RBC AUTO: 38.3 FL (ref 37–54)
EOSINOPHIL # BLD AUTO: 0.06 10*3/MM3 (ref 0–0.4)
EOSINOPHIL NFR BLD AUTO: 1.1 % (ref 0.3–6.2)
ERYTHROCYTE [DISTWIDTH] IN BLOOD BY AUTOMATED COUNT: 12.3 % (ref 12.3–15.4)
GFR SERPL CREATININE-BSD FRML MDRD: 107 ML/MIN/1.73
GLOBULIN UR ELPH-MCNC: 3.3 GM/DL
GLUCOSE BLD-MCNC: 97 MG/DL (ref 65–99)
GLUCOSE UR STRIP-MCNC: NEGATIVE MG/DL
HAV IGM SERPL QL IA: REACTIVE
HBV CORE IGM SERPL QL IA: ABNORMAL
HBV SURFACE AG SERPL QL IA: ABNORMAL
HCG SERPL QL: NEGATIVE
HCT VFR BLD AUTO: 43.6 % (ref 34–46.6)
HCV AB SER DONR QL: ABNORMAL
HGB BLD-MCNC: 14.9 G/DL (ref 12–15.9)
HGB UR QL STRIP.AUTO: NEGATIVE
HOLD SPECIMEN: NORMAL
HYALINE CASTS UR QL AUTO: ABNORMAL /LPF
IMM GRANULOCYTES # BLD AUTO: 0.03 10*3/MM3 (ref 0–0.05)
IMM GRANULOCYTES NFR BLD AUTO: 0.6 % (ref 0–0.5)
KETONES UR QL STRIP: NEGATIVE
LEUKOCYTE ESTERASE UR QL STRIP.AUTO: ABNORMAL
LIPASE SERPL-CCNC: 22 U/L (ref 13–60)
LYMPHOCYTES # BLD AUTO: 1.72 10*3/MM3 (ref 0.7–3.1)
LYMPHOCYTES NFR BLD AUTO: 32.5 % (ref 19.6–45.3)
MCH RBC QN AUTO: 29.3 PG (ref 26.6–33)
MCHC RBC AUTO-ENTMCNC: 34.2 G/DL (ref 31.5–35.7)
MCV RBC AUTO: 85.7 FL (ref 79–97)
MONOCYTES # BLD AUTO: 0.45 10*3/MM3 (ref 0.1–0.9)
MONOCYTES NFR BLD AUTO: 8.5 % (ref 5–12)
NEUTROPHILS # BLD AUTO: 3.01 10*3/MM3 (ref 1.7–7)
NEUTROPHILS NFR BLD AUTO: 56.7 % (ref 42.7–76)
NITRITE UR QL STRIP: NEGATIVE
NRBC BLD AUTO-RTO: 0 /100 WBC (ref 0–0.2)
PH UR STRIP.AUTO: 6 [PH] (ref 5–9)
PLATELET # BLD AUTO: 132 10*3/MM3 (ref 140–450)
PMV BLD AUTO: 10.7 FL (ref 6–12)
POIKILOCYTOSIS BLD QL SMEAR: NORMAL
POTASSIUM BLD-SCNC: 3.9 MMOL/L (ref 3.5–5.2)
PROT SERPL-MCNC: 7.4 G/DL (ref 6–8.5)
PROT UR QL STRIP: NEGATIVE
RBC # BLD AUTO: 5.09 10*6/MM3 (ref 3.77–5.28)
RBC # UR: ABNORMAL /HPF
REF LAB TEST METHOD: ABNORMAL
SMALL PLATELETS BLD QL SMEAR: NORMAL
SODIUM BLD-SCNC: 142 MMOL/L (ref 136–145)
SP GR UR STRIP: 1.01 (ref 1–1.03)
SQUAMOUS #/AREA URNS HPF: ABNORMAL /HPF
UROBILINOGEN UR QL STRIP: ABNORMAL
WBC MORPH BLD: NORMAL
WBC NRBC COR # BLD: 5.3 10*3/MM3 (ref 3.4–10.8)
WBC UR QL AUTO: ABNORMAL /HPF
WHOLE BLOOD HOLD SPECIMEN: NORMAL

## 2020-04-25 PROCEDURE — 25010000002 PROMETHAZINE PER 50 MG: Performed by: EMERGENCY MEDICINE

## 2020-04-25 PROCEDURE — 85007 BL SMEAR W/DIFF WBC COUNT: CPT | Performed by: EMERGENCY MEDICINE

## 2020-04-25 PROCEDURE — 63710000001 ONDANSETRON ODT 4 MG TABLET DISPERSIBLE: Performed by: EMERGENCY MEDICINE

## 2020-04-25 PROCEDURE — 83690 ASSAY OF LIPASE: CPT | Performed by: EMERGENCY MEDICINE

## 2020-04-25 PROCEDURE — 80307 DRUG TEST PRSMV CHEM ANLYZR: CPT | Performed by: EMERGENCY MEDICINE

## 2020-04-25 PROCEDURE — 81001 URINALYSIS AUTO W/SCOPE: CPT

## 2020-04-25 PROCEDURE — 80074 ACUTE HEPATITIS PANEL: CPT | Performed by: EMERGENCY MEDICINE

## 2020-04-25 PROCEDURE — 80053 COMPREHEN METABOLIC PANEL: CPT | Performed by: EMERGENCY MEDICINE

## 2020-04-25 PROCEDURE — 84703 CHORIONIC GONADOTROPIN ASSAY: CPT | Performed by: EMERGENCY MEDICINE

## 2020-04-25 PROCEDURE — 96361 HYDRATE IV INFUSION ADD-ON: CPT

## 2020-04-25 PROCEDURE — 96374 THER/PROPH/DIAG INJ IV PUSH: CPT

## 2020-04-25 PROCEDURE — 99283 EMERGENCY DEPT VISIT LOW MDM: CPT

## 2020-04-25 PROCEDURE — 85025 COMPLETE CBC W/AUTO DIFF WBC: CPT | Performed by: EMERGENCY MEDICINE

## 2020-04-25 RX ORDER — ONDANSETRON 4 MG/1
4 TABLET, FILM COATED ORAL EVERY 6 HOURS PRN
Qty: 20 TABLET | Refills: 0 | Status: SHIPPED | OUTPATIENT
Start: 2020-04-25 | End: 2020-07-08

## 2020-04-25 RX ORDER — ONDANSETRON 4 MG/1
4 TABLET, ORALLY DISINTEGRATING ORAL ONCE
Status: COMPLETED | OUTPATIENT
Start: 2020-04-25 | End: 2020-04-25

## 2020-04-25 RX ORDER — DEXTROSE AND SODIUM CHLORIDE 5; .9 G/100ML; G/100ML
1000 INJECTION, SOLUTION INTRAVENOUS ONCE
Status: COMPLETED | OUTPATIENT
Start: 2020-04-25 | End: 2020-04-25

## 2020-04-25 RX ORDER — PROMETHAZINE HYDROCHLORIDE 25 MG/ML
12.5 INJECTION, SOLUTION INTRAMUSCULAR; INTRAVENOUS ONCE
Status: COMPLETED | OUTPATIENT
Start: 2020-04-25 | End: 2020-04-25

## 2020-04-25 RX ORDER — SODIUM CHLORIDE 0.9 % (FLUSH) 0.9 %
10 SYRINGE (ML) INJECTION AS NEEDED
Status: DISCONTINUED | OUTPATIENT
Start: 2020-04-25 | End: 2020-04-25 | Stop reason: HOSPADM

## 2020-04-25 RX ADMIN — ONDANSETRON 4 MG: 4 TABLET, ORALLY DISINTEGRATING ORAL at 03:28

## 2020-04-25 RX ADMIN — DEXTROSE AND SODIUM CHLORIDE 1000 ML/HR: 5; 900 INJECTION, SOLUTION INTRAVENOUS at 01:22

## 2020-04-25 RX ADMIN — PROMETHAZINE HYDROCHLORIDE 12.5 MG: 25 INJECTION INTRAMUSCULAR; INTRAVENOUS at 01:22

## 2020-04-25 NOTE — ED TRIAGE NOTES
Pt. Presents to ED with nausea and drowsiness. Pt. Ambulates to ED. AAOX4. Airway patent. Breathing adequate and non-labored. No active bleeding

## 2020-04-25 NOTE — ED PROVIDER NOTES
Subjective   34-year-old female presents with nausea and vomiting for past 2 to 3 days.  Patient reports she has been very fatigued for past 3 days and has had nausea with several episodes of vomiting.  Reports mild diffuse abdominal pain.  Denies any diarrhea or constipation.  Denies any drug or alcohol use.  Reports prior history of hypoglycemia. She is not a diabetic          Review of Systems   Constitutional: Positive for fatigue. Negative for chills and fever.   HENT: Negative for drooling, rhinorrhea and voice change.    Respiratory: Negative for cough, shortness of breath and wheezing.    Cardiovascular: Negative for chest pain, palpitations and leg swelling.   Gastrointestinal: Positive for nausea and vomiting. Negative for abdominal pain, constipation and diarrhea.   Genitourinary: Negative for dysuria and hematuria.   Musculoskeletal: Negative for back pain, myalgias, neck pain and neck stiffness.   Skin: Negative for pallor and rash.   Neurological: Negative for dizziness, syncope, weakness and headaches.       Past Medical History:   Diagnosis Date   • Acneiform eruption    • Amenorrhea    • Anxiety    • Bipolar disorder (CMS/HCC)    • Tobacco dependence syndrome    • URI (upper respiratory infection)        No Known Allergies    Past Surgical History:   Procedure Laterality Date   • CERVIX SURGERY  2005    Revision of cervix (2)    incompetent cervix, cerclage    • DILATATION AND CURETTAGE     • PAP SMEAR      benign cellular changes reactive cellular changes. negative for intraepithelial lesion or malignancy       Family History   Problem Relation Age of Onset   • Hypertension Mother    • Hypertension Father    • Lung cancer Paternal Grandfather    • Bipolar disorder Other    • Diabetes Other        Social History     Socioeconomic History   • Marital status: Single     Spouse name: Not on file   • Number of children: Not on file   • Years of education: Not on file   • Highest education level: Not on  file   Tobacco Use   • Smoking status: Current Every Day Smoker     Packs/day: 0.50     Years: 17.00     Pack years: 8.50     Types: Cigarettes   • Smokeless tobacco: Never Used   Substance and Sexual Activity   • Alcohol use: No   • Drug use: No   • Sexual activity: Yes     Partners: Male     Birth control/protection: Injection           Objective   Physical Exam   Constitutional: She is oriented to person, place, and time. She appears well-developed and well-nourished. No distress.   HENT:   Head: Normocephalic and atraumatic.   Eyes: Pupils are equal, round, and reactive to light. EOM are normal. Right eye exhibits no discharge. Left eye exhibits no discharge.   Neck: Normal range of motion. No JVD present.   Cardiovascular: Normal rate, regular rhythm, normal heart sounds and intact distal pulses. Exam reveals no gallop and no friction rub.   No murmur heard.  Pulmonary/Chest: Effort normal and breath sounds normal. She has no wheezes. She has no rales.   Abdominal: Soft. She exhibits no distension and no mass. There is no tenderness. There is no guarding.   Musculoskeletal: Normal range of motion. She exhibits no edema or deformity.   Lymphadenopathy:     She has no cervical adenopathy.   Neurological: She is alert and oriented to person, place, and time. No cranial nerve deficit. She exhibits normal muscle tone.   Skin: Skin is warm and dry. Capillary refill takes less than 2 seconds. She is not diaphoretic. No erythema.   Nursing note and vitals reviewed.      Procedures           ED Course                 Results for orders placed or performed during the hospital encounter of 04/25/20   Urinalysis With Culture If Indicated - Urine, Clean Catch   Result Value Ref Range    Color, UA Dark Yellow Yellow, Straw, Dark Yellow, Ines    Appearance, UA Clear Clear    pH, UA 6.0 5.0 - 9.0    Specific [place], UA 1.009 1.003 - 1.030    Glucose, UA Negative Negative    Ketones, UA Negative Negative    Bilirubin, UA  Small (1+) (A) Negative    Blood, UA Negative Negative    Protein, UA Negative Negative    Leuk Esterase, UA Trace (A) Negative    Nitrite, UA Negative Negative    Urobilinogen, UA 1.0 E.U./dL 0.2 - 1.0 E.U./dL   Urinalysis, Microscopic Only - Urine, Clean Catch   Result Value Ref Range    RBC, UA 0-2 (A) None Seen /HPF    WBC, UA 3-5 None Seen, 0-2, 3-5 /HPF    Bacteria, UA None Seen None Seen /HPF    Squamous Epithelial Cells, UA 3-5 (A) None Seen, 0-2 /HPF    Hyaline Casts, UA 0-2 None Seen /LPF    Methodology Automated Microscopy    Comprehensive Metabolic Panel   Result Value Ref Range    Glucose 97 65 - 99 mg/dL    BUN 9 6 - 20 mg/dL    Creatinine 0.64 0.57 - 1.00 mg/dL    Sodium 142 136 - 145 mmol/L    Potassium 3.9 3.5 - 5.2 mmol/L    Chloride 101 98 - 107 mmol/L    CO2 27.0 22.0 - 29.0 mmol/L    Calcium 9.4 8.6 - 10.5 mg/dL    Total Protein 7.4 6.0 - 8.5 g/dL    Albumin 4.10 3.50 - 5.20 g/dL    ALT (SGPT) 2,116 (H) 1 - 33 U/L    AST (SGOT) 1,962 (H) 1 - 32 U/L    Alkaline Phosphatase 203 (H) 39 - 117 U/L    Total Bilirubin 3.0 (H) 0.2 - 1.2 mg/dL    eGFR Non African Amer 107 >60 mL/min/1.73    Globulin 3.3 gm/dL    A/G Ratio 1.2 g/dL    BUN/Creatinine Ratio 14.1 7.0 - 25.0    Anion Gap 14.0 5.0 - 15.0 mmol/L   Lipase   Result Value Ref Range    Lipase 22 13 - 60 U/L   hCG, Serum, Qualitative   Result Value Ref Range    HCG Qualitative Negative Negative   CBC Auto Differential   Result Value Ref Range    WBC 5.30 3.40 - 10.80 10*3/mm3    RBC 5.09 3.77 - 5.28 10*6/mm3    Hemoglobin 14.9 12.0 - 15.9 g/dL    Hematocrit 43.6 34.0 - 46.6 %    MCV 85.7 79.0 - 97.0 fL    MCH 29.3 26.6 - 33.0 pg    MCHC 34.2 31.5 - 35.7 g/dL    RDW 12.3 12.3 - 15.4 %    RDW-SD 38.3 37.0 - 54.0 fl    MPV 10.7 6.0 - 12.0 fL    Platelets 132 (L) 140 - 450 10*3/mm3    Neutrophil % 56.7 42.7 - 76.0 %    Lymphocyte % 32.5 19.6 - 45.3 %    Monocyte % 8.5 5.0 - 12.0 %    Eosinophil % 1.1 0.3 - 6.2 %    Basophil % 0.6 0.0 - 1.5 %    Immature  Grans % 0.6 (H) 0.0 - 0.5 %    Neutrophils, Absolute 3.01 1.70 - 7.00 10*3/mm3    Lymphocytes, Absolute 1.72 0.70 - 3.10 10*3/mm3    Monocytes, Absolute 0.45 0.10 - 0.90 10*3/mm3    Eosinophils, Absolute 0.06 0.00 - 0.40 10*3/mm3    Basophils, Absolute 0.03 0.00 - 0.20 10*3/mm3    Immature Grans, Absolute 0.03 0.00 - 0.05 10*3/mm3    nRBC 0.0 0.0 - 0.2 /100 WBC   Scan Slide   Result Value Ref Range    Poikilocytes Slight/1+ None Seen    WBC Morphology Normal Normal    Platelet Estimate Decreased Normal    Clumped Platelets Present None Seen   Acetaminophen Level   Result Value Ref Range    Acetaminophen <5.0 (L) 10.0 - 30.0 mcg/mL   Hepatitis Panel, Acute   Result Value Ref Range    Hepatitis B Surface Ag Non-Reactive Non-Reactive    Hep A IgM Reactive (A) Non-Reactive    Hep B C IgM Non-Reactive Non-Reactive    Hepatitis C Ab Non-Reactive Non-Reactive   Light Blue Top   Result Value Ref Range    Extra Tube hold for add-on      No Radiology Exams Resulted Within Past 24 Hours                               MDM  Number of Diagnoses or Management Options  Diagnosis management comments: Patient with acute hepatitis secondary to hepatitis A infection.  She is not actively vomiting, feels better after treatment.  Is comfortable with discharge home with close follow-up.  She understands to return to the hospital for any worsening vomiting or pain.      Final diagnoses:   Viral hepatitis A without hepatic coma            John Paul Small MD  04/25/20 5266

## 2020-05-04 ENCOUNTER — APPOINTMENT (OUTPATIENT)
Dept: ULTRASOUND IMAGING | Facility: HOSPITAL | Age: 35
End: 2020-05-04

## 2020-05-04 ENCOUNTER — HOSPITAL ENCOUNTER (OUTPATIENT)
Facility: HOSPITAL | Age: 35
Setting detail: OBSERVATION
Discharge: LEFT BEFORE DISCHARGE | End: 2020-05-04
Attending: FAMILY MEDICINE | Admitting: FAMILY MEDICINE

## 2020-05-04 ENCOUNTER — APPOINTMENT (OUTPATIENT)
Dept: GENERAL RADIOLOGY | Facility: HOSPITAL | Age: 35
End: 2020-05-04

## 2020-05-04 ENCOUNTER — APPOINTMENT (OUTPATIENT)
Dept: CT IMAGING | Facility: HOSPITAL | Age: 35
End: 2020-05-04

## 2020-05-04 VITALS
RESPIRATION RATE: 16 BRPM | BODY MASS INDEX: 28.12 KG/M2 | SYSTOLIC BLOOD PRESSURE: 131 MMHG | HEIGHT: 66 IN | HEART RATE: 67 BPM | TEMPERATURE: 96.5 F | OXYGEN SATURATION: 94 % | WEIGHT: 175 LBS | DIASTOLIC BLOOD PRESSURE: 70 MMHG

## 2020-05-04 DIAGNOSIS — K81.0 ACUTE CHOLECYSTITIS: Primary | ICD-10-CM

## 2020-05-04 LAB
ALBUMIN SERPL-MCNC: 3.2 G/DL (ref 3.5–5.2)
ALBUMIN SERPL-MCNC: 3.3 G/DL (ref 3.5–5.2)
ALBUMIN/GLOB SERPL: 1.1 G/DL
ALBUMIN/GLOB SERPL: 1.1 G/DL
ALP SERPL-CCNC: 223 U/L (ref 39–117)
ALP SERPL-CCNC: 233 U/L (ref 39–117)
ALT SERPL W P-5'-P-CCNC: 205 U/L (ref 1–33)
ALT SERPL W P-5'-P-CCNC: 216 U/L (ref 1–33)
AMPHET+METHAMPHET UR QL: NEGATIVE
AMPHETAMINES UR QL: NEGATIVE
ANION GAP SERPL CALCULATED.3IONS-SCNC: 11 MMOL/L (ref 5–15)
ANION GAP SERPL CALCULATED.3IONS-SCNC: 11 MMOL/L (ref 5–15)
APTT PPP: 30.9 SECONDS (ref 20–40.3)
AST SERPL-CCNC: 85 U/L (ref 1–32)
AST SERPL-CCNC: 86 U/L (ref 1–32)
BACTERIA UR QL AUTO: ABNORMAL /HPF
BARBITURATES UR QL SCN: NEGATIVE
BASOPHILS # BLD AUTO: 0.03 10*3/MM3 (ref 0–0.2)
BASOPHILS NFR BLD AUTO: 0.4 % (ref 0–1.5)
BENZODIAZ UR QL SCN: NEGATIVE
BILIRUB SERPL-MCNC: 1.1 MG/DL (ref 0.2–1.2)
BILIRUB SERPL-MCNC: 1.1 MG/DL (ref 0.2–1.2)
BILIRUB UR QL STRIP: NEGATIVE
BUN BLD-MCNC: 10 MG/DL (ref 6–20)
BUN BLD-MCNC: 10 MG/DL (ref 6–20)
BUN/CREAT SERPL: 16.4 (ref 7–25)
BUN/CREAT SERPL: 17.5 (ref 7–25)
BUPRENORPHINE SERPL-MCNC: NEGATIVE NG/ML
CALCIUM SPEC-SCNC: 8.7 MG/DL (ref 8.6–10.5)
CALCIUM SPEC-SCNC: 8.8 MG/DL (ref 8.6–10.5)
CANNABINOIDS SERPL QL: POSITIVE
CHLORIDE SERPL-SCNC: 108 MMOL/L (ref 98–107)
CHLORIDE SERPL-SCNC: 108 MMOL/L (ref 98–107)
CLARITY UR: CLEAR
CO2 SERPL-SCNC: 24 MMOL/L (ref 22–29)
CO2 SERPL-SCNC: 24 MMOL/L (ref 22–29)
COCAINE UR QL: NEGATIVE
COLOR UR: YELLOW
CREAT BLD-MCNC: 0.57 MG/DL (ref 0.57–1)
CREAT BLD-MCNC: 0.61 MG/DL (ref 0.57–1)
DEPRECATED RDW RBC AUTO: 47.8 FL (ref 37–54)
EOSINOPHIL # BLD AUTO: 0.16 10*3/MM3 (ref 0–0.4)
EOSINOPHIL NFR BLD AUTO: 2 % (ref 0.3–6.2)
ERYTHROCYTE [DISTWIDTH] IN BLOOD BY AUTOMATED COUNT: 14.7 % (ref 12.3–15.4)
GFR SERPL CREATININE-BSD FRML MDRD: 113 ML/MIN/1.73
GFR SERPL CREATININE-BSD FRML MDRD: 122 ML/MIN/1.73
GLOBULIN UR ELPH-MCNC: 2.8 GM/DL
GLOBULIN UR ELPH-MCNC: 3 GM/DL
GLUCOSE BLD-MCNC: 117 MG/DL (ref 65–99)
GLUCOSE BLD-MCNC: 118 MG/DL (ref 65–99)
GLUCOSE UR STRIP-MCNC: NEGATIVE MG/DL
HCG SERPL QL: NEGATIVE
HCT VFR BLD AUTO: 35.2 % (ref 34–46.6)
HGB BLD-MCNC: 11.7 G/DL (ref 12–15.9)
HGB UR QL STRIP.AUTO: ABNORMAL
HOLD SPECIMEN: NORMAL
HOLD SPECIMEN: NORMAL
HYALINE CASTS UR QL AUTO: ABNORMAL /LPF
IMM GRANULOCYTES # BLD AUTO: 0.11 10*3/MM3 (ref 0–0.05)
IMM GRANULOCYTES NFR BLD AUTO: 1.4 % (ref 0–0.5)
INR PPP: 1.02 (ref 0.8–1.2)
KETONES UR QL STRIP: NEGATIVE
LEUKOCYTE ESTERASE UR QL STRIP.AUTO: ABNORMAL
LIPASE SERPL-CCNC: 29 U/L (ref 13–60)
LYMPHOCYTES # BLD AUTO: 2.36 10*3/MM3 (ref 0.7–3.1)
LYMPHOCYTES NFR BLD AUTO: 29.8 % (ref 19.6–45.3)
MCH RBC QN AUTO: 29.8 PG (ref 26.6–33)
MCHC RBC AUTO-ENTMCNC: 33.2 G/DL (ref 31.5–35.7)
MCV RBC AUTO: 89.6 FL (ref 79–97)
METHADONE UR QL SCN: NEGATIVE
MONOCYTES # BLD AUTO: 0.98 10*3/MM3 (ref 0.1–0.9)
MONOCYTES NFR BLD AUTO: 12.4 % (ref 5–12)
NEUTROPHILS # BLD AUTO: 4.29 10*3/MM3 (ref 1.7–7)
NEUTROPHILS NFR BLD AUTO: 54 % (ref 42.7–76)
NITRITE UR QL STRIP: NEGATIVE
NRBC BLD AUTO-RTO: 0 /100 WBC (ref 0–0.2)
OPIATES UR QL: POSITIVE
OXYCODONE UR QL SCN: NEGATIVE
PCP UR QL SCN: NEGATIVE
PH UR STRIP.AUTO: 6 [PH] (ref 5–9)
PLATELET # BLD AUTO: 202 10*3/MM3 (ref 140–450)
PMV BLD AUTO: 10.9 FL (ref 6–12)
POTASSIUM BLD-SCNC: 4 MMOL/L (ref 3.5–5.2)
POTASSIUM BLD-SCNC: 4.3 MMOL/L (ref 3.5–5.2)
PROPOXYPH UR QL: NEGATIVE
PROT SERPL-MCNC: 6 G/DL (ref 6–8.5)
PROT SERPL-MCNC: 6.3 G/DL (ref 6–8.5)
PROT UR QL STRIP: NEGATIVE
PROTHROMBIN TIME: 13.2 SECONDS (ref 11.1–15.3)
RBC # BLD AUTO: 3.93 10*6/MM3 (ref 3.77–5.28)
RBC # UR: ABNORMAL /HPF
REF LAB TEST METHOD: ABNORMAL
SODIUM BLD-SCNC: 143 MMOL/L (ref 136–145)
SODIUM BLD-SCNC: 143 MMOL/L (ref 136–145)
SP GR UR STRIP: 1.06 (ref 1–1.03)
SQUAMOUS #/AREA URNS HPF: ABNORMAL /HPF
TRICYCLICS UR QL SCN: NEGATIVE
UROBILINOGEN UR QL STRIP: ABNORMAL
WBC NRBC COR # BLD: 7.93 10*3/MM3 (ref 3.4–10.8)
WBC UR QL AUTO: ABNORMAL /HPF
WHOLE BLOOD HOLD SPECIMEN: NORMAL
WHOLE BLOOD HOLD SPECIMEN: NORMAL

## 2020-05-04 PROCEDURE — 83690 ASSAY OF LIPASE: CPT | Performed by: STUDENT IN AN ORGANIZED HEALTH CARE EDUCATION/TRAINING PROGRAM

## 2020-05-04 PROCEDURE — 99285 EMERGENCY DEPT VISIT HI MDM: CPT

## 2020-05-04 PROCEDURE — 96376 TX/PRO/DX INJ SAME DRUG ADON: CPT

## 2020-05-04 PROCEDURE — 96361 HYDRATE IV INFUSION ADD-ON: CPT

## 2020-05-04 PROCEDURE — 25010000002 HYDROMORPHONE 1 MG/ML SOLUTION: Performed by: STUDENT IN AN ORGANIZED HEALTH CARE EDUCATION/TRAINING PROGRAM

## 2020-05-04 PROCEDURE — 25010000002 MORPHINE PER 10 MG: Performed by: FAMILY MEDICINE

## 2020-05-04 PROCEDURE — 96372 THER/PROPH/DIAG INJ SC/IM: CPT

## 2020-05-04 PROCEDURE — G0378 HOSPITAL OBSERVATION PER HR: HCPCS

## 2020-05-04 PROCEDURE — 96375 TX/PRO/DX INJ NEW DRUG ADDON: CPT

## 2020-05-04 PROCEDURE — 96365 THER/PROPH/DIAG IV INF INIT: CPT

## 2020-05-04 PROCEDURE — 80053 COMPREHEN METABOLIC PANEL: CPT | Performed by: EMERGENCY MEDICINE

## 2020-05-04 PROCEDURE — 71045 X-RAY EXAM CHEST 1 VIEW: CPT

## 2020-05-04 PROCEDURE — 85025 COMPLETE CBC W/AUTO DIFF WBC: CPT | Performed by: EMERGENCY MEDICINE

## 2020-05-04 PROCEDURE — 25010000002 KETOROLAC TROMETHAMINE PER 15 MG: Performed by: STUDENT IN AN ORGANIZED HEALTH CARE EDUCATION/TRAINING PROGRAM

## 2020-05-04 PROCEDURE — 25010000002 IOPAMIDOL 61 % SOLUTION: Performed by: FAMILY MEDICINE

## 2020-05-04 PROCEDURE — 25010000002 ONDANSETRON PER 1 MG: Performed by: STUDENT IN AN ORGANIZED HEALTH CARE EDUCATION/TRAINING PROGRAM

## 2020-05-04 PROCEDURE — 25010000002 HYDROMORPHONE 1 MG/ML SOLUTION: Performed by: FAMILY MEDICINE

## 2020-05-04 PROCEDURE — 76705 ECHO EXAM OF ABDOMEN: CPT

## 2020-05-04 PROCEDURE — 25010000002 PIPERACILLIN-TAZOBACTAM: Performed by: STUDENT IN AN ORGANIZED HEALTH CARE EDUCATION/TRAINING PROGRAM

## 2020-05-04 PROCEDURE — 84703 CHORIONIC GONADOTROPIN ASSAY: CPT | Performed by: STUDENT IN AN ORGANIZED HEALTH CARE EDUCATION/TRAINING PROGRAM

## 2020-05-04 PROCEDURE — 85730 THROMBOPLASTIN TIME PARTIAL: CPT | Performed by: STUDENT IN AN ORGANIZED HEALTH CARE EDUCATION/TRAINING PROGRAM

## 2020-05-04 PROCEDURE — 85610 PROTHROMBIN TIME: CPT | Performed by: STUDENT IN AN ORGANIZED HEALTH CARE EDUCATION/TRAINING PROGRAM

## 2020-05-04 PROCEDURE — 80053 COMPREHEN METABOLIC PANEL: CPT | Performed by: STUDENT IN AN ORGANIZED HEALTH CARE EDUCATION/TRAINING PROGRAM

## 2020-05-04 PROCEDURE — 81001 URINALYSIS AUTO W/SCOPE: CPT | Performed by: STUDENT IN AN ORGANIZED HEALTH CARE EDUCATION/TRAINING PROGRAM

## 2020-05-04 PROCEDURE — 74177 CT ABD & PELVIS W/CONTRAST: CPT

## 2020-05-04 PROCEDURE — 80306 DRUG TEST PRSMV INSTRMNT: CPT | Performed by: STUDENT IN AN ORGANIZED HEALTH CARE EDUCATION/TRAINING PROGRAM

## 2020-05-04 PROCEDURE — 25010000002 MORPHINE PER 10 MG: Performed by: INTERNAL MEDICINE

## 2020-05-04 RX ORDER — ONDANSETRON 2 MG/ML
4 INJECTION INTRAMUSCULAR; INTRAVENOUS EVERY 6 HOURS PRN
Status: DISCONTINUED | OUTPATIENT
Start: 2020-05-04 | End: 2020-05-04 | Stop reason: HOSPADM

## 2020-05-04 RX ORDER — ONDANSETRON 4 MG/1
4 TABLET, FILM COATED ORAL EVERY 6 HOURS PRN
Status: DISCONTINUED | OUTPATIENT
Start: 2020-05-04 | End: 2020-05-04 | Stop reason: HOSPADM

## 2020-05-04 RX ORDER — ACETAMINOPHEN 160 MG/5ML
650 SOLUTION ORAL EVERY 4 HOURS PRN
Status: DISCONTINUED | OUTPATIENT
Start: 2020-05-04 | End: 2020-05-04 | Stop reason: HOSPADM

## 2020-05-04 RX ORDER — NICOTINE 21 MG/24HR
1 PATCH, TRANSDERMAL 24 HOURS TRANSDERMAL EVERY 24 HOURS
Status: DISCONTINUED | OUTPATIENT
Start: 2020-05-04 | End: 2020-05-04 | Stop reason: HOSPADM

## 2020-05-04 RX ORDER — KETOROLAC TROMETHAMINE 30 MG/ML
30 INJECTION, SOLUTION INTRAMUSCULAR; INTRAVENOUS ONCE
Status: COMPLETED | OUTPATIENT
Start: 2020-05-04 | End: 2020-05-04

## 2020-05-04 RX ORDER — FAMOTIDINE 10 MG/ML
20 INJECTION, SOLUTION INTRAVENOUS ONCE
Status: COMPLETED | OUTPATIENT
Start: 2020-05-04 | End: 2020-05-04

## 2020-05-04 RX ORDER — MORPHINE SULFATE 2 MG/ML
2 INJECTION, SOLUTION INTRAMUSCULAR; INTRAVENOUS EVERY 4 HOURS PRN
Status: DISCONTINUED | OUTPATIENT
Start: 2020-05-04 | End: 2020-05-04 | Stop reason: HOSPADM

## 2020-05-04 RX ORDER — SODIUM CHLORIDE 0.9 % (FLUSH) 0.9 %
10 SYRINGE (ML) INJECTION EVERY 12 HOURS SCHEDULED
Status: DISCONTINUED | OUTPATIENT
Start: 2020-05-04 | End: 2020-05-04 | Stop reason: HOSPADM

## 2020-05-04 RX ORDER — FAMOTIDINE 10 MG/ML
20 INJECTION, SOLUTION INTRAVENOUS EVERY 12 HOURS SCHEDULED
Status: DISCONTINUED | OUTPATIENT
Start: 2020-05-04 | End: 2020-05-04 | Stop reason: HOSPADM

## 2020-05-04 RX ORDER — SODIUM CHLORIDE 9 MG/ML
125 INJECTION, SOLUTION INTRAVENOUS CONTINUOUS
Status: DISCONTINUED | OUTPATIENT
Start: 2020-05-04 | End: 2020-05-04

## 2020-05-04 RX ORDER — SODIUM CHLORIDE 0.9 % (FLUSH) 0.9 %
10 SYRINGE (ML) INJECTION AS NEEDED
Status: DISCONTINUED | OUTPATIENT
Start: 2020-05-04 | End: 2020-05-04 | Stop reason: HOSPADM

## 2020-05-04 RX ORDER — ACETAMINOPHEN 650 MG/1
650 SUPPOSITORY RECTAL EVERY 4 HOURS PRN
Status: DISCONTINUED | OUTPATIENT
Start: 2020-05-04 | End: 2020-05-04 | Stop reason: HOSPADM

## 2020-05-04 RX ORDER — ACETAMINOPHEN 325 MG/1
650 TABLET ORAL EVERY 4 HOURS PRN
Status: DISCONTINUED | OUTPATIENT
Start: 2020-05-04 | End: 2020-05-04 | Stop reason: HOSPADM

## 2020-05-04 RX ORDER — ONDANSETRON 2 MG/ML
4 INJECTION INTRAMUSCULAR; INTRAVENOUS ONCE
Status: COMPLETED | OUTPATIENT
Start: 2020-05-04 | End: 2020-05-04

## 2020-05-04 RX ORDER — NALOXONE HCL 0.4 MG/ML
0.4 VIAL (ML) INJECTION
Status: DISCONTINUED | OUTPATIENT
Start: 2020-05-04 | End: 2020-05-04 | Stop reason: HOSPADM

## 2020-05-04 RX ORDER — SODIUM CHLORIDE 9 MG/ML
100 INJECTION, SOLUTION INTRAVENOUS CONTINUOUS
Status: DISCONTINUED | OUTPATIENT
Start: 2020-05-04 | End: 2020-05-04 | Stop reason: HOSPADM

## 2020-05-04 RX ADMIN — SODIUM CHLORIDE 125 ML/HR: 900 INJECTION, SOLUTION INTRAVENOUS at 10:40

## 2020-05-04 RX ADMIN — FAMOTIDINE 20 MG: 10 INJECTION INTRAVENOUS at 06:46

## 2020-05-04 RX ADMIN — HYDROMORPHONE HYDROCHLORIDE 1 MG: 1 INJECTION, SOLUTION INTRAMUSCULAR; INTRAVENOUS; SUBCUTANEOUS at 08:52

## 2020-05-04 RX ADMIN — IOPAMIDOL 90 ML: 612 INJECTION, SOLUTION INTRAVENOUS at 07:46

## 2020-05-04 RX ADMIN — PIPERACILLIN AND TAZOBACTAM 4.5 G: 4; .5 INJECTION, POWDER, LYOPHILIZED, FOR SOLUTION INTRAVENOUS; PARENTERAL at 10:49

## 2020-05-04 RX ADMIN — SODIUM CHLORIDE 100 ML/HR: 9 INJECTION, SOLUTION INTRAVENOUS at 12:43

## 2020-05-04 RX ADMIN — ONDANSETRON 4 MG: 2 INJECTION INTRAMUSCULAR; INTRAVENOUS at 06:39

## 2020-05-04 RX ADMIN — HYDROMORPHONE HYDROCHLORIDE 1 MG: 1 INJECTION, SOLUTION INTRAMUSCULAR; INTRAVENOUS; SUBCUTANEOUS at 06:40

## 2020-05-04 RX ADMIN — MORPHINE SULFATE 2 MG: 2 INJECTION, SOLUTION INTRAMUSCULAR; INTRAVENOUS at 12:44

## 2020-05-04 RX ADMIN — KETOROLAC TROMETHAMINE 30 MG: 30 INJECTION, SOLUTION INTRAMUSCULAR at 11:17

## 2020-05-04 RX ADMIN — MORPHINE SULFATE 4 MG: 4 INJECTION, SOLUTION INTRAMUSCULAR; INTRAVENOUS at 08:23

## 2020-05-04 NOTE — H&P
[facility] Medicine Services  INPATIENT HISTORY AND PHYSICAL       Patient Care Team:  Provider, No Known as PCP - Fredy Cordova MD (Family Medicine)  Wellington Selby MD as Resident (Family Medicine)  Robbie Garcia MD as Resident (Family Medicine)  Steve Shay MD as Resident (Family Medicine)  Emily Reid MD as Resident (Family Medicine)    Chief complaint   Chief Complaint   Patient presents with   • Abdominal Pain       Subjective     Patient is a 34 y.o. female with history of ADHD/bipolar disorder, nicotine dependence presents with malaise abdominal pain more prominent on the left lower and left upper quadrant associated with nausea.  She also reported an episode of vomiting but denies fever, chills, chest pain, shortness of air, hematemesis, melena or hematochezia.  She also denies right upper quadrant pain.    On triage she was hemodynamically stable.  Complaints of metabolic panel showed elevated LFTs and CT scan of the abdomen and pelvis showed cholelithiasis with calcified gallstone and possible acute cholecystitis.  Right upper quadrant ultrasound showed cholelithiasis with gallbladder wall thickening suggestive of acute cholecystitis.      Review of Systems   Constitutional: Positive for activity change, appetite change and fatigue. Negative for chills, diaphoresis and fever.   HENT: Negative for trouble swallowing and voice change.    Eyes: Negative for photophobia and visual disturbance.   Respiratory: Negative for cough, choking, chest tightness, shortness of breath, wheezing and stridor.    Cardiovascular: Negative for chest pain, palpitations and leg swelling.   Gastrointestinal: Positive for abdominal pain, nausea and vomiting. Negative for abdominal distention, blood in stool, constipation and diarrhea.   Endocrine: Negative for cold intolerance, heat intolerance, polydipsia, polyphagia and polyuria.   Genitourinary:  Negative for decreased urine volume, difficulty urinating, dysuria, enuresis, flank pain, frequency, hematuria and urgency.   Musculoskeletal: Negative for arthralgias, gait problem, myalgias, neck pain and neck stiffness.   Skin: Negative for pallor, rash and wound.   Neurological: Negative for dizziness, tremors, seizures, syncope, facial asymmetry, speech difficulty, weakness, light-headedness, numbness and headaches.   Hematological: Does not bruise/bleed easily.   Psychiatric/Behavioral: Negative for agitation, behavioral problems and confusion.         History  Past Medical History:   Diagnosis Date   • Acneiform eruption    • Amenorrhea    • Anxiety    • Bipolar disorder (CMS/HCC)    • Tobacco dependence syndrome    • URI (upper respiratory infection)      Past Surgical History:   Procedure Laterality Date   • CERVIX SURGERY  2005    Revision of cervix (2)    incompetent cervix, cerclage    • DILATATION AND CURETTAGE     • PAP SMEAR      benign cellular changes reactive cellular changes. negative for intraepithelial lesion or malignancy     Family History   Problem Relation Age of Onset   • Hypertension Mother    • Hypertension Father    • Lung cancer Paternal Grandfather    • Bipolar disorder Other    • Diabetes Other      Social History     Tobacco Use   • Smoking status: Current Every Day Smoker     Packs/day: 0.50     Years: 17.00     Pack years: 8.50     Types: Cigarettes   • Smokeless tobacco: Never Used   Substance Use Topics   • Alcohol use: No   • Drug use: No       (Not in a hospital admission)  Allergies:  Patient has no known allergies.  Prior to Admission medications    Medication Sig Start Date End Date Taking? Authorizing Provider   amphetamine-dextroamphetamine XR (ADDERALL XR) 25 MG 24 hr capsule TAKE 1 CAPSULE EVERY DAY AS DIRECTED 3/13/19   ProviderBeth MD   naproxen (NAPROSYN) 500 MG tablet One twice daily with food. 1/30/19   Abraham Dias MD   ondansetron (ZOFRAN) 4 MG  tablet Take 1 tablet by mouth Every 6 (Six) Hours As Needed for Vomiting. 4/25/20   John Paul Small MD       Objective        Vital Signs  Temp:  [98 °F (36.7 °C)] 98 °F (36.7 °C)  Heart Rate:  [67-98] 67  Resp:  [16-20] 18  BP: (111-122)/(69-82) 119/82      Physical Exam   Constitutional: She is oriented to person, place, and time. She appears well-developed and well-nourished. She is cooperative. No distress.   HENT:   Head: Normocephalic and atraumatic.   Right Ear: External ear normal.   Left Ear: External ear normal.   Nose: Nose normal.   Mouth/Throat: Oropharynx is clear and moist.   Eyes: Pupils are equal, round, and reactive to light. Conjunctivae and EOM are normal.   Neck: Normal range of motion. Neck supple. No JVD present. No thyromegaly present.   Cardiovascular: Normal rate, regular rhythm, normal heart sounds and intact distal pulses. Exam reveals no gallop and no friction rub.   No murmur heard.  Pulmonary/Chest: Effort normal and breath sounds normal. No stridor. No respiratory distress. She has no wheezes. She has no rales. She exhibits no tenderness.   Abdominal: Soft. Bowel sounds are normal. She exhibits no distension and no mass. There is no tenderness. There is no rebound and no guarding. No hernia.   She has nonspecific periumbilical, epigastric and right upper quadrant discomfort.   Musculoskeletal: Normal range of motion. She exhibits no edema, tenderness or deformity.   Neurological: She is alert and oriented to person, place, and time. She has normal reflexes. She displays normal reflexes. No cranial nerve deficit or sensory deficit. She exhibits normal muscle tone. Coordination normal.   Skin: Skin is warm and dry. No rash noted. She is not diaphoretic. No erythema. No pallor.   Psychiatric: She has a normal mood and affect. Her behavior is normal. Judgment and thought content normal.   Nursing note and vitals reviewed.        Results Review:     Results from last 7 days   Lab  Units 05/04/20  0612   SODIUM mmol/L 143  143   POTASSIUM mmol/L 4.3  4.0   CHLORIDE mmol/L 108*  108*   CO2 mmol/L 24.0  24.0   BUN mg/dL 10  10   CREATININE mg/dL 0.61  0.57   GLUCOSE mg/dL 118*  117*   CALCIUM mg/dL 8.7  8.8   BILIRUBIN mg/dL 1.1  1.1   ALK PHOS U/L 233*  223*   ALT (SGPT) U/L 205*  216*   AST (SGOT) U/L 85*  86*       Results from last 7 days   Lab Units 04/29/20  0430 04/28/20  1030   MAGNESIUM mg/dl 1.8 1.9       Results from last 7 days   Lab Units 05/04/20  0612   WBC 10*3/mm3 7.93   HEMOGLOBIN g/dL 11.7*   HEMATOCRIT % 35.2   PLATELETS 10*3/mm3 202       Results from last 7 days   Lab Units 05/04/20  0612   INR  1.02     Imaging Results (Last 7 Days)     Procedure Component Value Units Date/Time    US Gallbladder [[id number]] Collected:  05/04/20 0926     Updated:  05/04/20 1025    Narrative:       Ultrasound gallbladder, right upper quadrant.    HISTORY: Abdominal pain.    Prior exam: CT May 4, 2020.    FINDINGS: Normal liver. No masses or intrahepatic biliary  dilatation.    Numerous gallstones within the gallbladder. There is some  gallbladder wall thickening and there is tenderness to right  upper quadrant palpation by ultrasound (Polanco sign). Normal  common bile duct 0.45 cm. Normal pancreas. Normal right kidney.  11.8 x 5.1 x 7.6 cm.    Normal aorta and inferior vena cava.      Impression:       Cholelithiasis with gallbladder wall thickening and  right upper quadrant tenderness to ultrasound palpation, Polanco  sign. These findings suggest acute cholecystitis.    Electronically signed by:  Mookie Vela MD  5/4/2020 10:24 AM CDT  Workstation: [id number]    CT Abdomen Pelvis With Contrast [[id number]] Collected:  05/04/20 0729     Updated:  05/04/20 0805    Narrative:       CT abdomen, pelvis with contrast.         CLINICAL INDICATION: Abdominal pain       COMPARISON: CT December 29, 2018.       TECHNIQUE: 90 mL Isovue-300,  nonionic IV contrast. Helical  scanning with axial and  coronal reformations. Soft tissue, lung,  and bone windows reviewed.    This exam was performed according to our departmental  dose-optimization program, which includes automated exposure  control, adjustment of the mA and/or kV according to patient size  and/or use of iterative reconstruction technique.    ABDOMEN CT FINDINGS: The visualized lung bases show minor  dependent changes.     There is some periportal edema surrounding the portal veins in  the liver. This is a nonspecific finding and may be seen in a  variety of entities such as sepsis or hepatitis. Liver is  otherwise unremarkable.    There are calcified stones within the gallbladder. There is  gallbladder wall thickening surrounding the gallbladder neck  possible cholecystitis. The spleen, pancreas, adrenal glands are  normal in appearance. Normal right kidney. Slightly diminished  enhancement and slight fullness of the calyces and renal pelvis  left kidney of uncertain significance. No discrete ureteral  calculus is observed. Bowel grossly unremarkable. Normal  appendix.    No evidence of pathologically enlarged nodes, free air, or free  fluid. Degenerative changes of the spine.  The bones are grossly unremarkable.    PELVIS CT FINDINGS: There are no pelvic masses.    No evidence of  pathologically enlarged nodes, free air, or free fluid.     The bones are grossly unremarkable.      Impression:       Periportal edema surrounding the portal veins in the  liver. This is a nonspecific finding and is seen in a variety of  disorders including hepatitis and sepsis.    Abnormal gallbladder. Calcified gallstones and a mild degree of  gallbladder wall thickening in the gallbladder neck. Possible  acute cholecystitis.        Abnormal left kidney with slightly decreased enhancement, slight  fullness of the calyces and left renal pelvis without evidence of  any discrete ureteral calculus. CT abdomen, pelvis with contrast  is otherwise unremarkable.      Electronically signed by:  Mookie Vela MD  5/4/2020 8:04 AM CDT  Workstation: [facility]    XR Chest 1 View [[id number]] Collected:  05/04/20 0721     Updated:  05/04/20 0749    Narrative:       Chest x-ray single view.         CLINICAL INDICATION: Shortness of breath. Abdominal pain    COMPARISON: None    FINDINGS: Cardiac silhouette is normal in size. Pulmonary  vascularity is unremarkable.     No focal infiltrate or consolidation.  No pleural effusion.  No  pneumothorax.      Impression:       No evidence of active disease. Normal chest.    Electronically signed by:  Mookie Vela MD  5/4/2020 7:48 AM CDT  Workstation: [facility]          Assessment / Plan       Hospital Problem List:  Active Problems:    Acute cholecystitis    Cholelithiasis complicated by acute cholecystitis: Patient will be admitted, made n.p.o., continued on IV antibiotics, IV fluids and HIDA scan will be done.  Elevated LFTs are likely reactive and will be monitored.  Will check hepatitis profile.  Dr. Patton has been consulted.    Nicotine dependence: Begin nicotine patch.  Nicotine cessation counseling has been discussed with patient.    Begin GI and DVT prophylaxis.    Additional orders and treatment plan as hospital dictates.      I discussed the patient's findings and my recommendations with patient.     Robbie Hester MD  05/04/20  11:56      Dictated Utilizing Dragon Dictation

## 2020-05-04 NOTE — CONSULTS
Referring Provider: Dr. Arechiga emergency room      Patient Care Team:  Provider, No Known as PCP - Fredy Cordova MD (Family Medicine)  Wellington Selby MD as Resident (Family Medicine)  Robbie Garcia MD as Resident (Family Medicine)  Steve Shay MD as Resident (Family Medicine)  Emily Reid MD as Resident (Family Medicine)      Subjective .  Abdominal pain    History of present illness:   34-year-old female who presented to emergency room early this morning with acute onset of left upper quadrant abdominal pain.  Patient had a CT scan and ultrasound in our emergency room felt that the patient had acute cholecystitis.  Labs were significant of transaminases of 205 and 85 and alkaline phosphatase of 233 with a normal total bilirubin.  White count was normal at 8 and hemoglobin was 12.  By report patient was hospitalized 9 days ago with hepatitis a and had significantly elevated bilirubin up to over 4 according to the chart.  Urinalysis suggested a specific gravity of 1060.  Lipase was normal.  When I spoke to the emergency room I asked them to obtain a HIDA scan to confirm the diagnosis since the patient did have left upper quadrant abdominal pain which would be somewhat atypical with acute cholecystitis.  When the patient got to the floor after being admitted by the hospitalist she demanded to have something to eat.  I was called by the nursing staff and offered her clear liquid diet but did not want to give her any regular food until at least had an opportunity to talk to her and examine her.  Drug screen was positive for narcotics and marijuana.  Patient refused to give a urine drug screen until after she was given narcotics for pain in her emergency room.  When I arrived the patient initially cursed me and kept saying over and over that she should be able to eat.  I explained to her that I thought that she needed to talk to me more about her symptoms and to let me examine her.   She refused and said she was going to sign out AGAINST MEDICAL ADVICE.  No exam was done  Review of Systems no review of systems were done as per above      History  Past Medical History:   Diagnosis Date   • Acneiform eruption    • Amenorrhea    • Anxiety    • Bipolar disorder (CMS/HCC)    • Tobacco dependence syndrome    • URI (upper respiratory infection)    , Past Surgical History:   Procedure Laterality Date   • CERVIX SURGERY  2005    Revision of cervix (2)    incompetent cervix, cerclage    • DILATATION AND CURETTAGE     • PAP SMEAR      benign cellular changes reactive cellular changes. negative for intraepithelial lesion or malignancy   , Family History   Problem Relation Age of Onset   • Hypertension Mother    • Hypertension Father    • Lung cancer Paternal Grandfather    • Bipolar disorder Other    • Diabetes Other    , Social History     Tobacco Use   • Smoking status: Current Every Day Smoker     Packs/day: 0.50     Years: 17.00     Pack years: 8.50     Types: Cigarettes   • Smokeless tobacco: Never Used   Substance Use Topics   • Alcohol use: No   • Drug use: No   , Home Medications:  Prior to Admission medications    Medication Sig Start Date End Date Taking? Authorizing Provider   ondansetron (ZOFRAN) 4 MG tablet Take 1 tablet by mouth Every 6 (Six) Hours As Needed for Vomiting. 4/25/20   John Paul Small MD   amphetamine-dextroamphetamine XR (ADDERALL XR) 25 MG 24 hr capsule TAKE 1 CAPSULE EVERY DAY AS DIRECTED 3/13/19 5/4/20  Provider, MD Beth   naproxen (NAPROSYN) 500 MG tablet One twice daily with food. 1/30/19 5/4/20  Abraham Dias MD   , Scheduled Meds:    famotidine 20 mg Intravenous Q12H   nicotine 1 patch Transdermal Q24H   piperacillin-tazobactam 3.375 g Intravenous Q8H   sodium chloride 10 mL Intravenous Q12H   , Continuous Infusions:    Pharmacy to Dose Zosyn     sodium chloride 100 mL/hr Last Rate: Stopped (05/04/20 1631)   , PRN Meds:  •  acetaminophen **OR**  acetaminophen **OR** acetaminophen  •  Morphine **AND** naloxone  •  ondansetron **OR** ondansetron  •  sodium chloride  •  sodium chloride and Allergies:  No Known Allergies    Objective     Vital Signs   Temp:  [96.1 °F (35.6 °C)-98 °F (36.7 °C)] 96.5 °F (35.8 °C)  Heart Rate:  [67-98] 67  Resp:  [16-20] 16  BP: (111-131)/(69-84) 131/70    Physical Exam:     Patient was asleep but easily arousable when we arrived.  She refused to allow on exam                                                                   Assessment/Plan Please note that I spoke with this patient the entire time with Dr. Del Toro present and also her nurse that was taking care of her at the time.  I attempted to explain to her that we will try to help her and try to sort out what her actual problem was.  She wished to sign out AMA and again refused any further exam or evaluation       I         This document has been electronically signed by Michael Patton MD on May 4, 2020 17:16     Michael Patton MD  05/04/20  17:16

## 2020-05-04 NOTE — ED PROVIDER NOTES
Subjective   History of Present Illness  Patient presents to the ed with severe 10/10 left upper quadrant pain. Patient describes it as dull and cramping. Patient states she has a history of hepatitis. She has not vomited. She has been nauseous. She denies fever. Patient states the pain does not radiate anywhere. Started about 30 minutes ago. She has never had pain like this before. Nothing makes it better or worse.   Review of Systems   Constitutional: Negative for activity change, appetite change, chills, fatigue and fever.   HENT: Negative for drooling, ear discharge, ear pain, facial swelling, hearing loss, mouth sores, rhinorrhea and sinus pain.    Eyes: Negative for pain, redness and itching.   Respiratory: Negative for cough, choking, chest tightness, shortness of breath and stridor.    Cardiovascular: Negative for chest pain, palpitations and leg swelling.   Gastrointestinal: Positive for abdominal pain. Negative for abdominal distention, anal bleeding, blood in stool, constipation, diarrhea and nausea.   Endocrine: Negative for heat intolerance, polydipsia and polyphagia.   Genitourinary: Negative for dysuria, flank pain, frequency and genital sores.   Musculoskeletal: Negative for back pain, gait problem, joint swelling and myalgias.   Skin: Negative for pallor and rash.   Neurological: Negative for seizures, facial asymmetry, speech difficulty, light-headedness, numbness and headaches.   Hematological: Negative for adenopathy. Does not bruise/bleed easily.   Psychiatric/Behavioral: Negative for confusion, decreased concentration, dysphoric mood and hallucinations. The patient is not nervous/anxious and is not hyperactive.        Past Medical History:   Diagnosis Date   • Acneiform eruption    • Amenorrhea    • Anxiety    • Bipolar disorder (CMS/HCC)    • Tobacco dependence syndrome    • URI (upper respiratory infection)        No Known Allergies    Past Surgical History:   Procedure Laterality Date   •  CERVIX SURGERY  2005    Revision of cervix (2)    incompetent cervix, cerclage    • DILATATION AND CURETTAGE     • PAP SMEAR      benign cellular changes reactive cellular changes. negative for intraepithelial lesion or malignancy       Family History   Problem Relation Age of Onset   • Hypertension Mother    • Hypertension Father    • Lung cancer Paternal Grandfather    • Bipolar disorder Other    • Diabetes Other        Social History     Socioeconomic History   • Marital status: Single     Spouse name: Not on file   • Number of children: Not on file   • Years of education: Not on file   • Highest education level: Not on file   Tobacco Use   • Smoking status: Current Every Day Smoker     Packs/day: 0.50     Years: 17.00     Pack years: 8.50     Types: Cigarettes   • Smokeless tobacco: Never Used   Substance and Sexual Activity   • Alcohol use: No   • Drug use: No   • Sexual activity: Yes     Partners: Male     Birth control/protection: Injection           Objective   Physical Exam   Constitutional: She is oriented to person, place, and time. She appears well-developed and well-nourished.   HENT:   Head: Normocephalic and atraumatic.   Right Ear: External ear normal.   Left Ear: External ear normal.   Nose: Nose normal.   Mouth/Throat: Oropharynx is clear and moist.   Eyes: Pupils are equal, round, and reactive to light. Conjunctivae and EOM are normal.   Neck: Normal range of motion. Neck supple.   Cardiovascular: Normal rate, regular rhythm, normal heart sounds and intact distal pulses.   Pulmonary/Chest: Effort normal and breath sounds normal.   Abdominal: Soft. Bowel sounds are normal.   Musculoskeletal: Normal range of motion.   Neurological: She is alert and oriented to person, place, and time.   Skin: Skin is warm and dry.   Psychiatric: She has a normal mood and affect. Her behavior is normal. Judgment and thought content normal.       Procedures           ED Course  ED Course as of May 04 1041   Mon May  04, 2020   1040 Patients liver enzymes are decreasing from what it was two weeks ago. Had a ct which showed possible cholecystitis, ultrasound ordered which confirmed it. Spoke to dr bunn who said to admit to the hospitalist and for them to order a hida scan. Patient will be admitted to the hospital team, spoke to dr ivan. Patient will be started on zosyn.     [SA]      ED Course User Index  [SA] Kyung Gill MD                [facility]    Final diagnoses:   Acute cholecystitis            Kyung Gill MD  Resident  05/04/20 1041

## 2020-05-04 NOTE — PLAN OF CARE
Problem: Patient Care Overview  Goal: Plan of Care Review  Outcome: Ongoing (interventions implemented as appropriate)  Flowsheets (Taken 5/4/2020 6552)  Progress: no change  Plan of Care Reviewed With: patient  Outcome Summary: patient pain controlled with morphine, bunn to see, hida scan in AM  Goal: Individualization and Mutuality  Outcome: Ongoing (interventions implemented as appropriate)  Goal: Discharge Needs Assessment  Outcome: Ongoing (interventions implemented as appropriate)  Goal: Interprofessional Rounds/Family Conf  Outcome: Ongoing (interventions implemented as appropriate)     Problem: Infection, Risk/Actual (Adult)  Goal: Identify Related Risk Factors and Signs and Symptoms  Outcome: Ongoing (interventions implemented as appropriate)  Goal: Infection Prevention/Resolution  Outcome: Ongoing (interventions implemented as appropriate)     Problem: Pain, Acute (Adult)  Goal: Identify Related Risk Factors and Signs and Symptoms  Outcome: Ongoing (interventions implemented as appropriate)  Goal: Acceptable Pain Control/Comfort Level  Outcome: Ongoing (interventions implemented as appropriate)

## 2020-05-04 NOTE — NURSING NOTE
Patient verbally aggressive with RN. MD came to floor to discuss diagnosis and testing. Patient verbally aggressive with MD, using explicit language. Patient leaving AMA because she wants solid food, claiming she is being treated like a child and [facility] will take better care of her.

## 2020-05-04 NOTE — ED NOTES
Ultrasound here to transport pt for gallbladder ultrasound.  Pt stated to ultrasound tech to wait 15 minutes before going anywhere.    Pt requesting pain medication.  Informed md.    Nafisa Arechiga, TREY  05/04/20 0838       Nafisa Arechiga RN  05/04/20 0867

## 2020-05-04 NOTE — ED NOTES
Patient brought to the ED via EMS with c/o abdominal pain, pt has hx of hepatitis A.      Austin Franco, RN  05/04/20 0634

## 2020-05-04 NOTE — ED NOTES
Patient acknowledge the need of urine. Patient unable to provide the urine sample at this time     Austin Franco RN  05/04/20 0704     No

## 2020-05-05 NOTE — DISCHARGE SUMMARY
[facility] Medicine Services  DISCHARGE SUMMARY       Date of Admission: 5/4/2020  Date of Discharge: 5/4/2020  Primary Care Physician: Provider, No Known    Presenting Problem/History of Present Illness:  Acute cholecystitis [K81.0]   Patient is a 34 y.o. female with history of ADHD/bipolar disorder, nicotine dependence presents with malaise abdominal pain more prominent on the left lower and left upper quadrant associated with nausea.  She also reported an episode of vomiting but denies fever, chills, chest pain, shortness of air, hematemesis, melena or hematochezia.  She also denies right upper quadrant pain.     On triage she was hemodynamically stable.  Complaints of metabolic panel showed elevated LFTs and CT scan of the abdomen and pelvis showed cholelithiasis with calcified gallstone and possible acute cholecystitis.  Right upper quadrant ultrasound showed cholelithiasis with gallbladder wall thickening suggestive of acute cholecystitis.    Final Discharge Diagnoses:  Active Hospital Problems    Diagnosis   • Acute cholecystitis       Consults:   Consults     Date and Time Order Name Status Description    5/4/2020 1039 Surgery (on-call MD unless specified) Completed           Procedures Performed: None.                Pertinent Test Results:   Lab Results (last 7 days)     Procedure Component Value Units Date/Time    Urine Drug Screen - Urine, Clean Catch [[id number]]  (Abnormal) Collected:  05/04/20 1123    Specimen:  Urine, Clean Catch Updated:  05/04/20 1155     THC, Screen, Urine Positive     Phencyclidine (PCP), Urine Negative     Cocaine Screen, Urine Negative     Methamphetamine, Ur Negative     Opiate Screen Positive     Amphetamine Screen, Urine Negative     Benzodiazepine Screen, Urine Negative     Tricyclic Antidepressants Screen Negative     Methadone Screen, Urine Negative     Barbiturates Screen, Urine Negative     Oxycodone Screen, Urine Negative      Propoxyphene Screen Negative     Buprenorphine, Screen, Urine Negative    Narrative:       Cutoff For Drugs Screened:    Amphetamines               500 ng/ml  Barbiturates               200 ng/ml  Benzodiazepines            150 ng/ml  Cocaine                    150 ng/ml  Methadone                  200 ng/ml  Opiates                    100 ng/ml  Phencyclidine               25 ng/ml  THC                            50 ng/ml  Methamphetamine            500 ng/ml  Tricyclic Antidepressants  300 ng/ml  Oxycodone                  100 ng/ml  Propoxyphene               300 ng/ml  Buprenorphine               10 ng/ml    The normal value for all drugs tested is negative. This report includes unconfirmed screening results, with the cutoff values listed, to be used for medical treatment purposes only.  Unconfirmed results must not be used for non-medical purposes such as employment or legal testing.  Clinical consideration should be applied to any drug of abuse test, particularly when unconfirmed results are used.      Urinalysis, Microscopic Only - Urine, Clean Catch [[id number]]  (Abnormal) Collected:  05/04/20 1123    Specimen:  Urine, Clean Catch Updated:  05/04/20 1152     RBC, UA 13-20 /HPF      WBC, UA Too Numerous to Count /HPF      Bacteria, UA 1+ /HPF      Squamous Epithelial Cells, UA 6-12 /HPF      Hyaline Casts, UA 0-2 /LPF      Methodology Automated Microscopy    Urinalysis With Microscopic If Indicated (No Culture) - Urine, Clean Catch [[id number]]  (Abnormal) Collected:  05/04/20 1123    Specimen:  Urine, Clean Catch Updated:  05/04/20 1152     Color, UA Yellow     Appearance, UA Clear     pH, UA 6.0     Specific Gravity, UA 1.060     Comment: Result obtained by Refractometer        Glucose, UA Negative     Ketones, UA Negative     Bilirubin, UA Negative     Blood, UA Trace     Protein, UA Negative     Leuk Esterase, UA Large (3+)     Nitrite, UA Negative     Urobilinogen, UA 1.0 E.U./dL    [place] Draw  [[id number]] Collected:  05/04/20 0612    Specimen:  Blood Updated:  05/04/20 0715    Narrative:       The following orders were created for panel order [place] Draw.  Procedure                               Abnormality         Status                     ---------                               -----------         ------                     Light Blue Top[[id number]]                                   Final result               Green Top (Gel)[[id number]]                                  Final result               Lavender Top[[id number]]                                     Final result               Gold Top - SST[[id number]]                                   Final result                 Please view results for these tests on the individual orders.    Gold Top - SST [[id number]] Collected:  05/04/20 0612    Specimen:  Blood Updated:  05/04/20 0715     Extra Tube Hold for add-ons.     Comment: Auto resulted.       Light Blue Top [[id number]] Collected:  05/04/20 0612    Specimen:  Blood Updated:  05/04/20 0715     Extra Tube hold for add-on     Comment: Auto resulted       Green Top (Gel) [[id number]] Collected:  05/04/20 0612    Specimen:  Blood Updated:  05/04/20 0715     Extra Tube Hold for add-ons.     Comment: Auto resulted.       Lavender Top [[id number]] Collected:  05/04/20 0612    Specimen:  Blood Updated:  05/04/20 0715     Extra Tube hold for add-on     Comment: Auto resulted       hCG, Serum, Qualitative [[id number]]  (Normal) Collected:  05/04/20 0612    Specimen:  Blood Updated:  05/04/20 0659     HCG Qualitative Negative    Comprehensive Metabolic Panel [[id number]]  (Abnormal) Collected:  05/04/20 0612    Specimen:  Blood Updated:  05/04/20 0657     Glucose 118 mg/dL      BUN 10 mg/dL      Creatinine 0.61 mg/dL      Sodium 143 mmol/L      Potassium 4.3 mmol/L      Chloride 108 mmol/L      CO2 24.0 mmol/L      Calcium 8.7 mg/dL      Total Protein 6.0 g/dL      Albumin 3.20 g/dL      ALT (SGPT) 205 U/L      AST  (SGOT) 85 U/L      Alkaline Phosphatase 233 U/L      Total Bilirubin 1.1 mg/dL      eGFR Non African Amer 113 mL/min/1.73      Globulin 2.8 gm/dL      A/G Ratio 1.1 g/dL      BUN/Creatinine Ratio 16.4     Anion Gap 11.0 mmol/L     Narrative:       GFR Normal >60  Chronic Kidney Disease <60  Kidney Failure <15      Lipase [[id number]]  (Normal) Collected:  05/04/20 0612    Specimen:  Blood Updated:  05/04/20 0646     Lipase 29 U/L     Protime-INR [[id number]]  (Normal) Collected:  05/04/20 0612    Specimen:  Blood Updated:  05/04/20 0646     Protime 13.2 Seconds      INR 1.02    Narrative:       Therapeutic range for most indications is 2.0-3.0 INR,  or 2.5-3.5 for mechanical heart valves.    aPTT [[id number]]  (Normal) Collected:  05/04/20 0612    Specimen:  Blood Updated:  05/04/20 0646     PTT 30.9 seconds     Narrative:       The recommended Heparin therapeutic range is 68-97 seconds.    Comprehensive Metabolic Panel [[id number]]  (Abnormal) Collected:  05/04/20 0612    Specimen:  Blood Updated:  05/04/20 0640     Glucose 117 mg/dL      BUN 10 mg/dL      Creatinine 0.57 mg/dL      Sodium 143 mmol/L      Potassium 4.0 mmol/L      Chloride 108 mmol/L      CO2 24.0 mmol/L      Calcium 8.8 mg/dL      Total Protein 6.3 g/dL      Albumin 3.30 g/dL      ALT (SGPT) 216 U/L      AST (SGOT) 86 U/L      Alkaline Phosphatase 223 U/L      Total Bilirubin 1.1 mg/dL      eGFR Non African Amer 122 mL/min/1.73      Globulin 3.0 gm/dL      A/G Ratio 1.1 g/dL      BUN/Creatinine Ratio 17.5     Anion Gap 11.0 mmol/L     Narrative:       GFR Normal >60  Chronic Kidney Disease <60  Kidney Failure <15      CBC & Differential [[id number]] Collected:  05/04/20 0612    Specimen:  Blood Updated:  05/04/20 0620    Narrative:       The following orders were created for panel order CBC & Differential.  Procedure                               Abnormality         Status                     ---------                               -----------          ------                     CBC Auto Differential[[id number]]        Abnormal            Final result                 Please view results for these tests on the individual orders.    CBC Auto Differential [[id number]]  (Abnormal) Collected:  05/04/20 0612    Specimen:  Blood Updated:  05/04/20 0620     WBC 7.93 10*3/mm3      RBC 3.93 10*6/mm3      Hemoglobin 11.7 g/dL      Hematocrit 35.2 %      MCV 89.6 fL      MCH 29.8 pg      MCHC 33.2 g/dL      RDW 14.7 %      RDW-SD 47.8 fl      MPV 10.9 fL      Platelets 202 10*3/mm3      Neutrophil % 54.0 %      Lymphocyte % 29.8 %      Monocyte % 12.4 %      Eosinophil % 2.0 %      Basophil % 0.4 %      Immature Grans % 1.4 %      Neutrophils, Absolute 4.29 10*3/mm3      Lymphocytes, Absolute 2.36 10*3/mm3      Monocytes, Absolute 0.98 10*3/mm3      Eosinophils, Absolute 0.16 10*3/mm3      Basophils, Absolute 0.03 10*3/mm3      Immature Grans, Absolute 0.11 10*3/mm3      nRBC 0.0 /100 WBC         Imaging Results (Last 7 Days)     Procedure Component Value Units Date/Time    US Gallbladder [[id number]] Collected:  05/04/20 0926     Updated:  05/04/20 1025    Narrative:       Ultrasound gallbladder, right upper quadrant.    HISTORY: Abdominal pain.    Prior exam: CT May 4, 2020.    FINDINGS: Normal liver. No masses or intrahepatic biliary  dilatation.    Numerous gallstones within the gallbladder. There is some  gallbladder wall thickening and there is tenderness to right  upper quadrant palpation by ultrasound (Polanco sign). Normal  common bile duct 0.45 cm. Normal pancreas. Normal right kidney.  11.8 x 5.1 x 7.6 cm.    Normal aorta and inferior vena cava.      Impression:       Cholelithiasis with gallbladder wall thickening and  right upper quadrant tenderness to ultrasound palpation, Polanco  sign. These findings suggest acute cholecystitis.    Electronically signed by:  Mookie Vela MD  5/4/2020 10:24 AM CDT  Workstation: [id number]    CT Abdomen Pelvis With Contrast [[id number]]  Collected:  05/04/20 0729     Updated:  05/04/20 0805    Narrative:       CT abdomen, pelvis with contrast.         CLINICAL INDICATION: Abdominal pain       COMPARISON: CT December 29, 2018.       TECHNIQUE: 90 mL Isovue-300,  nonionic IV contrast. Helical  scanning with axial and coronal reformations. Soft tissue, lung,  and bone windows reviewed.    This exam was performed according to our departmental  dose-optimization program, which includes automated exposure  control, adjustment of the mA and/or kV according to patient size  and/or use of iterative reconstruction technique.    ABDOMEN CT FINDINGS: The visualized lung bases show minor  dependent changes.     There is some periportal edema surrounding the portal veins in  the liver. This is a nonspecific finding and may be seen in a  variety of entities such as sepsis or hepatitis. Liver is  otherwise unremarkable.    There are calcified stones within the gallbladder. There is  gallbladder wall thickening surrounding the gallbladder neck  possible cholecystitis. The spleen, pancreas, adrenal glands are  normal in appearance. Normal right kidney. Slightly diminished  enhancement and slight fullness of the calyces and renal pelvis  left kidney of uncertain significance. No discrete ureteral  calculus is observed. Bowel grossly unremarkable. Normal  appendix.    No evidence of pathologically enlarged nodes, free air, or free  fluid. Degenerative changes of the spine.  The bones are grossly unremarkable.    PELVIS CT FINDINGS: There are no pelvic masses.    No evidence of  pathologically enlarged nodes, free air, or free fluid.     The bones are grossly unremarkable.      Impression:       Periportal edema surrounding the portal veins in the  liver. This is a nonspecific finding and is seen in a variety of  disorders including hepatitis and sepsis.    Abnormal gallbladder. Calcified gallstones and a mild degree of  gallbladder wall thickening in the gallbladder  "neck. Possible  acute cholecystitis.        Abnormal left kidney with slightly decreased enhancement, slight  fullness of the calyces and left renal pelvis without evidence of  any discrete ureteral calculus. CT abdomen, pelvis with contrast  is otherwise unremarkable.     Electronically signed by:  Mookie Vela MD  5/4/2020 8:04 AM CDT  Workstation: [id number]    XR Chest 1 View [[id number]] Collected:  05/04/20 0721     Updated:  05/04/20 0749    Narrative:       Chest x-ray single view.         CLINICAL INDICATION: Shortness of breath. Abdominal pain    COMPARISON: None    FINDINGS: Cardiac silhouette is normal in size. Pulmonary  vascularity is unremarkable.     No focal infiltrate or consolidation.  No pleural effusion.  No  pneumothorax.      Impression:       No evidence of active disease. Normal chest.    Electronically signed by:  Mookie Vela MD  5/4/2020 7:48 AM CDT  Workstation: [id number]            Chief Complaint on Day of Discharge: Not applicable as patient left AGAINST MEDICAL ADVICE.    Hospital Course:  The patient was admitted for cholelithiasis complicated by acute cholecystitis and started on IV antibiotics, IV fluids, pain control and referred to general surgery.  Patient was also scheduled for HIDA scan and started on clear liquids diet pending HIDA scan and surgery.  Patient reportedly became confrontational and was requesting regular diet.  She decided to leave the hospital AGAINST MEDICAL ADVICE because she was refused regular diet.     Condition on Discharge: Not applicable as patient left AGAINST MEDICAL ADVICE    Physical Exam on Discharge:  /70 (BP Location: Left arm, Patient Position: Lying)   Pulse 67   Temp 96.5 °F (35.8 °C) (Oral)   Resp 16   Ht 167.6 cm (66\")   Wt 79.4 kg (175 lb)   LMP  (LMP Unknown)   SpO2 94%   BMI 28.25 kg/m²   Physical Exam  Not applicable as patient left AGAINST MEDICAL ADVICE    Discharge Disposition:  Left Against Medical Advice    Discharge " Medications:     Discharge Medications      ASK your doctor about these medications      Instructions Start Date   ondansetron 4 MG tablet  Commonly known as:  ZOFRAN   4 mg, Oral, Every 6 Hours PRN             Discharge Diet: Not applicable    Activity at Discharge: Not applicable     Discharge Care Plan/Instructions: Not applicable    Follow-up Appointments:   No future appointments.    Test Results Pending at Discharge:     Robbie Hester MD  05/05/20  11:39    Time: 20 minutes

## 2020-06-09 ENCOUNTER — APPOINTMENT (OUTPATIENT)
Dept: ULTRASOUND IMAGING | Facility: HOSPITAL | Age: 35
End: 2020-06-09

## 2020-06-09 ENCOUNTER — HOSPITAL ENCOUNTER (EMERGENCY)
Facility: HOSPITAL | Age: 35
Discharge: HOME | End: 2020-06-09
Attending: EMERGENCY MEDICINE | Admitting: EMERGENCY MEDICINE

## 2020-06-09 VITALS
HEART RATE: 82 BPM | TEMPERATURE: 97.7 F | DIASTOLIC BLOOD PRESSURE: 91 MMHG | BODY MASS INDEX: 42.75 KG/M2 | SYSTOLIC BLOOD PRESSURE: 136 MMHG | OXYGEN SATURATION: 97 % | WEIGHT: 266 LBS | HEIGHT: 66 IN | RESPIRATION RATE: 16 BRPM

## 2020-06-09 DIAGNOSIS — R10.2 PELVIC PAIN AFFECTING PREGNANCY IN FIRST TRIMESTER, ANTEPARTUM: Primary | ICD-10-CM

## 2020-06-09 DIAGNOSIS — O26.891 PELVIC PAIN AFFECTING PREGNANCY IN FIRST TRIMESTER, ANTEPARTUM: Primary | ICD-10-CM

## 2020-06-09 LAB
ABO GROUP BLD: NORMAL
BACTERIA UR QL AUTO: ABNORMAL /HPF
BASOPHILS # BLD AUTO: 0.03 10*3/MM3 (ref 0–0.2)
BASOPHILS NFR BLD AUTO: 0.3 % (ref 0–1.5)
BILIRUB UR QL STRIP: NEGATIVE
CLARITY UR: ABNORMAL
COLOR UR: YELLOW
DEPRECATED RDW RBC AUTO: 41.2 FL (ref 37–54)
EOSINOPHIL # BLD AUTO: 0.19 10*3/MM3 (ref 0–0.4)
EOSINOPHIL NFR BLD AUTO: 1.7 % (ref 0.3–6.2)
ERYTHROCYTE [DISTWIDTH] IN BLOOD BY AUTOMATED COUNT: 13.2 % (ref 12.3–15.4)
GLUCOSE UR STRIP-MCNC: NEGATIVE MG/DL
HCT VFR BLD AUTO: 33.9 % (ref 34–46.6)
HGB BLD-MCNC: 12.1 G/DL (ref 12–15.9)
HGB UR QL STRIP.AUTO: ABNORMAL
HOLD SPECIMEN: NORMAL
HOLD SPECIMEN: NORMAL
HYALINE CASTS UR QL AUTO: ABNORMAL /LPF
IMM GRANULOCYTES # BLD AUTO: 0.05 10*3/MM3 (ref 0–0.05)
IMM GRANULOCYTES NFR BLD AUTO: 0.5 % (ref 0–0.5)
KETONES UR QL STRIP: NEGATIVE
LEUKOCYTE ESTERASE UR QL STRIP.AUTO: ABNORMAL
LYMPHOCYTES # BLD AUTO: 2.12 10*3/MM3 (ref 0.7–3.1)
LYMPHOCYTES NFR BLD AUTO: 19.1 % (ref 19.6–45.3)
MCH RBC QN AUTO: 30.7 PG (ref 26.6–33)
MCHC RBC AUTO-ENTMCNC: 35.7 G/DL (ref 31.5–35.7)
MCV RBC AUTO: 86 FL (ref 79–97)
MONOCYTES # BLD AUTO: 0.66 10*3/MM3 (ref 0.1–0.9)
MONOCYTES NFR BLD AUTO: 6 % (ref 5–12)
NEUTROPHILS # BLD AUTO: 8.04 10*3/MM3 (ref 1.7–7)
NEUTROPHILS NFR BLD AUTO: 72.4 % (ref 42.7–76)
NITRITE UR QL STRIP: NEGATIVE
NRBC BLD AUTO-RTO: 0 /100 WBC (ref 0–0.2)
PH UR STRIP.AUTO: 6 [PH] (ref 5–9)
PLATELET # BLD AUTO: 224 10*3/MM3 (ref 140–450)
PMV BLD AUTO: 10.4 FL (ref 6–12)
PROT UR QL STRIP: ABNORMAL
RBC # BLD AUTO: 3.94 10*6/MM3 (ref 3.77–5.28)
RBC # UR: ABNORMAL /HPF
REF LAB TEST METHOD: ABNORMAL
RH BLD: NEGATIVE
SP GR UR STRIP: 1.02 (ref 1–1.03)
SQUAMOUS #/AREA URNS HPF: ABNORMAL /HPF
TRICHOMONAS #/AREA URNS HPF: ABNORMAL /HPF
UROBILINOGEN UR QL STRIP: ABNORMAL
WBC NRBC COR # BLD: 11.09 10*3/MM3 (ref 3.4–10.8)
WBC UR QL AUTO: ABNORMAL /HPF
WHOLE BLOOD HOLD SPECIMEN: NORMAL

## 2020-06-09 PROCEDURE — 81001 URINALYSIS AUTO W/SCOPE: CPT | Performed by: STUDENT IN AN ORGANIZED HEALTH CARE EDUCATION/TRAINING PROGRAM

## 2020-06-09 PROCEDURE — 87491 CHLMYD TRACH DNA AMP PROBE: CPT | Performed by: STUDENT IN AN ORGANIZED HEALTH CARE EDUCATION/TRAINING PROGRAM

## 2020-06-09 PROCEDURE — P9612 CATHETERIZE FOR URINE SPEC: HCPCS

## 2020-06-09 PROCEDURE — 87086 URINE CULTURE/COLONY COUNT: CPT | Performed by: STUDENT IN AN ORGANIZED HEALTH CARE EDUCATION/TRAINING PROGRAM

## 2020-06-09 PROCEDURE — 87591 N.GONORRHOEAE DNA AMP PROB: CPT | Performed by: STUDENT IN AN ORGANIZED HEALTH CARE EDUCATION/TRAINING PROGRAM

## 2020-06-09 PROCEDURE — 96372 THER/PROPH/DIAG INJ SC/IM: CPT

## 2020-06-09 PROCEDURE — 86900 BLOOD TYPING SEROLOGIC ABO: CPT | Performed by: STUDENT IN AN ORGANIZED HEALTH CARE EDUCATION/TRAINING PROGRAM

## 2020-06-09 PROCEDURE — 36415 COLL VENOUS BLD VENIPUNCTURE: CPT | Performed by: STUDENT IN AN ORGANIZED HEALTH CARE EDUCATION/TRAINING PROGRAM

## 2020-06-09 PROCEDURE — 86901 BLOOD TYPING SEROLOGIC RH(D): CPT | Performed by: STUDENT IN AN ORGANIZED HEALTH CARE EDUCATION/TRAINING PROGRAM

## 2020-06-09 PROCEDURE — 76801 OB US < 14 WKS SINGLE FETUS: CPT

## 2020-06-09 PROCEDURE — 25010000003 RHO D IMMUNE GLOBULIN 1500 UNITS SOLUTION PREFILLED SYRINGE: Performed by: STUDENT IN AN ORGANIZED HEALTH CARE EDUCATION/TRAINING PROGRAM

## 2020-06-09 PROCEDURE — 85025 COMPLETE CBC W/AUTO DIFF WBC: CPT | Performed by: STUDENT IN AN ORGANIZED HEALTH CARE EDUCATION/TRAINING PROGRAM

## 2020-06-09 PROCEDURE — 87661 TRICHOMONAS VAGINALIS AMPLIF: CPT | Performed by: STUDENT IN AN ORGANIZED HEALTH CARE EDUCATION/TRAINING PROGRAM

## 2020-06-09 PROCEDURE — 99283 EMERGENCY DEPT VISIT LOW MDM: CPT

## 2020-06-09 RX ORDER — CEPHALEXIN 250 MG/1
250 CAPSULE ORAL 3 TIMES DAILY
Qty: 21 CAPSULE | Refills: 0 | Status: SHIPPED | OUTPATIENT
Start: 2020-06-09 | End: 2020-07-08

## 2020-06-09 RX ORDER — METRONIDAZOLE 500 MG/1
500 TABLET ORAL 2 TIMES DAILY
Qty: 14 TABLET | Refills: 0 | Status: SHIPPED | OUTPATIENT
Start: 2020-06-09 | End: 2020-06-16

## 2020-06-09 RX ADMIN — RHO(D) IMMUNE GLOBULIN (HUMAN) 300 MCG: 1500 SOLUTION INTRAMUSCULAR at 19:52

## 2020-06-09 NOTE — ED NOTES
Pt states she is pregnant, having spotting and cramping since this morning. Pt refuses to have IV. Pt uncooperative at this time. Pt very agitated while answering triage questions. Pt remains on her phone the entire time while this nurse was in the room.      Alethea Mccarty, RN  06/09/20 4070

## 2020-06-09 NOTE — ED PROVIDER NOTES
Subjective   33 yo female presents complaining of abdominal cramping and vaginal spotting.  Patient states that she found out she was pregnant 2 weeks ago, she is a .  She states that 2 of her children lives with her mother in [place], she is estranged from her mother and does not keep in touch with her.  And 1 of her other children lives with the father here in [place].  Patient states that she is Rh-.  Patient admits to smoking cigarettes and marijuana during this pregnancy.  She states she does not have an OB/GYN.  Patient also states that she is unsure if she wants to keep this pregnancy.  She also mentions that she is unsure if she is moving to [place] or staying in [place].    Patient's disposition shows disinterest with the situation.  Concentrated on her phone with very limited answers to questions.          Review of Systems   Constitutional: Negative for chills and fever.   HENT: Negative for rhinorrhea and sore throat.    Eyes: Negative for photophobia and visual disturbance.   Respiratory: Negative for cough and shortness of breath.    Cardiovascular: Negative for chest pain and palpitations.   Gastrointestinal: Negative for nausea.   Genitourinary: Positive for pelvic pain and vaginal bleeding. Negative for difficulty urinating and flank pain.   Musculoskeletal: Negative for arthralgias and back pain.   Neurological: Negative for dizziness and headaches.   Psychiatric/Behavioral: Negative for confusion. The patient is not nervous/anxious.        Past Medical History:   Diagnosis Date   • Acneiform eruption    • Amenorrhea    • Anxiety    • Bipolar disorder (CMS/[facility])    • Tobacco dependence syndrome    • URI (upper respiratory infection)        No Known Allergies    Past Surgical History:   Procedure Laterality Date   • CERVIX SURGERY      Revision of cervix (2)    incompetent cervix, cerclage    • DILATATION AND CURETTAGE     • PAP SMEAR      benign cellular changes reactive  cellular changes. negative for intraepithelial lesion or malignancy       Family History   Problem Relation Age of Onset   • Hypertension Mother    • Hypertension Father    • Lung cancer Paternal Grandfather    • Bipolar disorder Other    • Diabetes Other        Social History     Socioeconomic History   • Marital status: Single     Spouse name: Not on file   • Number of children: Not on file   • Years of education: Not on file   • Highest education level: Not on file   Tobacco Use   • Smoking status: Current Every Day Smoker     Packs/day: 0.50     Years: 17.00     Pack years: 8.50     Types: Cigarettes   • Smokeless tobacco: Never Used   Substance and Sexual Activity   • Alcohol use: No   • Drug use: No   • Sexual activity: Yes     Partners: Male     Birth control/protection: Injection           Objective   Physical Exam   Constitutional: She is oriented to person, place, and time. Vital signs are normal. She appears well-developed and well-nourished. She is uncooperative. Face mask in place.   HENT:   Head: Normocephalic.   Right Ear: Hearing normal.   Left Ear: Hearing normal.   Nose: Nose normal.   Mouth/Throat: Mucous membranes are normal.   Eyes: Pupils are equal, round, and reactive to light. Conjunctivae and lids are normal.   Neck: Neck supple.   Cardiovascular: Normal rate, regular rhythm, normal heart sounds and intact distal pulses.   Pulmonary/Chest: Effort normal and breath sounds normal.   Abdominal: Soft. Bowel sounds are normal. There is no tenderness.   Genitourinary: Rectum normal. Rectal exam shows anal tone normal. Pelvic exam was performed with patient supine. No labial fusion. There is no rash, tenderness, lesion or injury on the right labia. There is no rash, tenderness, lesion or injury on the left labia. Uterus is tender. Cervix exhibits discharge. Cervix exhibits no friability. Right adnexum displays tenderness. Left adnexum displays tenderness. No erythema, tenderness or bleeding in the  vagina. No signs of injury around the vagina. No vaginal discharge found.   Neurological: She is alert and oriented to person, place, and time.   Skin: Skin is warm.   Psychiatric: Her speech is normal. Judgment and thought content normal. Her affect is blunt. She is withdrawn. Cognition and memory are normal.   Patient expressed indecisiveness regarding this pregnancy, she showed some level of this regard to using substances while pregnant.  I advised the patient to quit smoking cigarettes and marijuana while she is pregnant, as well as using any alcohol if she has, stating that it causes damage to the fetus.  Patient only showed acknowledgment and understanding.       Procedures     None      ED Course  ED Course as of Jun 09 1928   Tue Jun 09, 2020   1740 Pelvic ultrasound less than 14 weeks gestation.    [NA]   1800 Pelvic exam was performed, bimanual exam patient had a lot of tenderness.  She stated it was uncomfortable.  There was no bleeding noted in the vaginal vault, cervix, and the cervical os was closed.     [NA]   1816 CBC, ABO/Rh, Chlamydia, gonorrhea, trichomonas PCR swab.  UA.    [NA]   1821 Call Dr. Guzman to inform him of this patient following up outpatient.    [NA]   1911 Patient has a UTI.    [NA]   1927 Patient will get an injection of 300 mg RhoGam IV.  She will be discharged to follow-up with OB/GYN Dr. Guzman in 1 week.  She will be discharged with metronidazole 500 mg twice daily for trichomonas infection for 7 days.  Cephalexin 250 mg 3 times daily for UTI for 7 days.    [NA]      ED Course User Index  [NA] Bernice Urena MD        Results for orders placed or performed during the hospital encounter of 06/09/20   Urinalysis With Culture If Indicated - Urine, Catheter   Result Value Ref Range    Color, UA Yellow Yellow, Straw, Dark Yellow, Ines    Appearance, UA Cloudy (A) Clear    pH, UA 6.0 5.0 - 9.0    Specific Gravity, UA 1.025 1.003 - 1.030    Glucose, UA Negative Negative     Ketones, UA Negative Negative    Bilirubin, UA Negative Negative    Blood, UA Small (1+) (A) Negative    Protein, UA Trace (A) Negative    Leuk Esterase, UA Large (3+) (A) Negative    Nitrite, UA Negative Negative    Urobilinogen, UA 0.2 E.U./dL 0.2 - 1.0 E.U./dL   CBC Auto Differential   Result Value Ref Range    WBC 11.09 (H) 3.40 - 10.80 10*3/mm3    RBC 3.94 3.77 - 5.28 10*6/mm3    Hemoglobin 12.1 12.0 - 15.9 g/dL    Hematocrit 33.9 (L) 34.0 - 46.6 %    MCV 86.0 79.0 - 97.0 fL    MCH 30.7 26.6 - 33.0 pg    MCHC 35.7 31.5 - 35.7 g/dL    RDW 13.2 12.3 - 15.4 %    RDW-SD 41.2 37.0 - 54.0 fl    MPV 10.4 6.0 - 12.0 fL    Platelets 224 140 - 450 10*3/mm3    Neutrophil % 72.4 42.7 - 76.0 %    Lymphocyte % 19.1 (L) 19.6 - 45.3 %    Monocyte % 6.0 5.0 - 12.0 %    Eosinophil % 1.7 0.3 - 6.2 %    Basophil % 0.3 0.0 - 1.5 %    Immature Grans % 0.5 0.0 - 0.5 %    Neutrophils, Absolute 8.04 (H) 1.70 - 7.00 10*3/mm3    Lymphocytes, Absolute 2.12 0.70 - 3.10 10*3/mm3    Monocytes, Absolute 0.66 0.10 - 0.90 10*3/mm3    Eosinophils, Absolute 0.19 0.00 - 0.40 10*3/mm3    Basophils, Absolute 0.03 0.00 - 0.20 10*3/mm3    Immature Grans, Absolute 0.05 0.00 - 0.05 10*3/mm3    nRBC 0.0 0.0 - 0.2 /100 WBC   Urinalysis, Microscopic Only - Urine, Catheter   Result Value Ref Range    RBC, UA 21-30 (A) None Seen /HPF    WBC, UA Too Numerous to Count (A) None Seen, 0-2, 3-5 /HPF    Bacteria, UA 3+ (A) None Seen /HPF    Squamous Epithelial Cells, UA 6-12 (A) None Seen, 0-2 /HPF    Hyaline Casts, UA 3-6 None Seen /LPF    Trichomonas, UA Large/3+ (A) None Seen /HPF    Methodology Manual Light Microscopy    ABO / Rh   Result Value Ref Range    ABO Type O     RH type Negative      US Ob < 14 Weeks Single or First Gestation  Narrative: Transvaginal pelvic ultrasound OB less than 14 weeks    HISTORY: Cramping and bleeding.    Transvaginal ultrasound of the pelvis was performed.    COMPARISON: None.    FINDINGS:    Single live intrauterine pregnancy.  Intrauterine gestational sac,  yolk sac, fetus and fetal heart activity are present. Amount of  amniotic fluid within normal limits. Too early to determine  placental location.    Fetal heart rate 161.09 beats per minute. Crown-rump length of  1.60 cm corresponds to 8 weeks EGA. Gestational age by LMP of  4/1/2020 is 9 weeks and 6 days. Ultrasound LATRELL 1/19/2021.    Right maternal ovary unremarkable. Left ovary not visualized.  No free fluid.    2.31 cm x 1.42 cm x 0.27 cm inferior subchorionic hemorrhage.  Impression: CONCLUSION:  Live 8 week intrauterine fetus.  Fetal heart rate 161.09 beats per minute.  2.31 cm x 1.42 cm x 0.27 cm inferior subchorionic hemorrhage.    [id number]    Electronically signed by:  Tray Yoon MD  6/9/2020 5:51 PM CDT  Workstation: [id number]      [facility]    Final diagnoses:   Pelvic pain affecting pregnancy in first trimester, antepartum       This document has been electronically signed by Bernice Urena MD on June 9, 2020 19:29     Part of this note may be an electronic transcription/translation of spoken language to printed text using the Dragon Dictation System. '         Bernice Urena MD  Resident  06/09/20 1929

## 2020-06-09 NOTE — ED NOTES
Attempted to get urine specimen, pt stating she unable to get that right now. Pt informed importance of getting urine sample, pt states she will let me know when she is able to go.     Alethea Mccarty RN  06/09/20 2117

## 2020-06-10 LAB
BACTERIA SPEC AEROBE CULT: NO GROWTH
C TRACH RRNA CVX QL NAA+PROBE: NEGATIVE
N GONORRHOEA RRNA SPEC QL NAA+PROBE: NEGATIVE
TRICHOMONAS VAGINALIS PCR: POSITIVE

## 2020-07-08 ENCOUNTER — APPOINTMENT (OUTPATIENT)
Dept: ULTRASOUND IMAGING | Facility: HOSPITAL | Age: 35
End: 2020-07-08

## 2020-07-08 ENCOUNTER — HOSPITAL ENCOUNTER (EMERGENCY)
Facility: HOSPITAL | Age: 35
Discharge: HOME | End: 2020-07-08
Attending: STUDENT IN AN ORGANIZED HEALTH CARE EDUCATION/TRAINING PROGRAM | Admitting: FAMILY MEDICINE

## 2020-07-08 VITALS
HEIGHT: 66 IN | TEMPERATURE: 98.4 F | DIASTOLIC BLOOD PRESSURE: 74 MMHG | WEIGHT: 236.4 LBS | RESPIRATION RATE: 20 BRPM | SYSTOLIC BLOOD PRESSURE: 127 MMHG | OXYGEN SATURATION: 98 % | HEART RATE: 81 BPM | BODY MASS INDEX: 37.99 KG/M2

## 2020-07-08 DIAGNOSIS — N93.9 VAGINAL BLEEDING: ICD-10-CM

## 2020-07-08 DIAGNOSIS — O20.0 THREATENED MISCARRIAGE: Primary | ICD-10-CM

## 2020-07-08 LAB
ALBUMIN SERPL-MCNC: 3.7 G/DL (ref 3.5–5.2)
ALBUMIN/GLOB SERPL: 1.2 G/DL
ALP SERPL-CCNC: 55 U/L (ref 39–117)
ALT SERPL W P-5'-P-CCNC: 12 U/L (ref 1–33)
ANION GAP SERPL CALCULATED.3IONS-SCNC: 10 MMOL/L (ref 5–15)
AST SERPL-CCNC: 14 U/L (ref 1–32)
BACTERIA UR QL AUTO: ABNORMAL /HPF
BASOPHILS # BLD AUTO: 0.03 10*3/MM3 (ref 0–0.2)
BASOPHILS NFR BLD AUTO: 0.3 % (ref 0–1.5)
BILIRUB SERPL-MCNC: 0.3 MG/DL (ref 0–1.2)
BILIRUB UR QL STRIP: NEGATIVE
BUN SERPL-MCNC: 12 MG/DL (ref 6–20)
BUN/CREAT SERPL: 15 (ref 7–25)
CALCIUM SPEC-SCNC: 8.8 MG/DL (ref 8.6–10.5)
CHLORIDE SERPL-SCNC: 99 MMOL/L (ref 98–107)
CLARITY UR: ABNORMAL
CO2 SERPL-SCNC: 24 MMOL/L (ref 22–29)
COLOR UR: YELLOW
CREAT SERPL-MCNC: 0.8 MG/DL (ref 0.57–1)
DEPRECATED RDW RBC AUTO: 40.9 FL (ref 37–54)
EOSINOPHIL # BLD AUTO: 0.17 10*3/MM3 (ref 0–0.4)
EOSINOPHIL NFR BLD AUTO: 1.8 % (ref 0.3–6.2)
ERYTHROCYTE [DISTWIDTH] IN BLOOD BY AUTOMATED COUNT: 13.1 % (ref 12.3–15.4)
GFR SERPL CREATININE-BSD FRML MDRD: 82 ML/MIN/1.73
GLOBULIN UR ELPH-MCNC: 3.1 GM/DL
GLUCOSE SERPL-MCNC: 102 MG/DL (ref 65–99)
GLUCOSE UR STRIP-MCNC: NEGATIVE MG/DL
HCG INTACT+B SERPL-ACNC: NORMAL MIU/ML
HCT VFR BLD AUTO: 34.1 % (ref 34–46.6)
HGB BLD-MCNC: 12 G/DL (ref 12–15.9)
HGB UR QL STRIP.AUTO: NEGATIVE
HYALINE CASTS UR QL AUTO: ABNORMAL /LPF
IMM GRANULOCYTES # BLD AUTO: 0.03 10*3/MM3 (ref 0–0.05)
IMM GRANULOCYTES NFR BLD AUTO: 0.3 % (ref 0–0.5)
KETONES UR QL STRIP: NEGATIVE
LEUKOCYTE ESTERASE UR QL STRIP.AUTO: ABNORMAL
LYMPHOCYTES # BLD AUTO: 2.1 10*3/MM3 (ref 0.7–3.1)
LYMPHOCYTES NFR BLD AUTO: 22.7 % (ref 19.6–45.3)
MCH RBC QN AUTO: 30.1 PG (ref 26.6–33)
MCHC RBC AUTO-ENTMCNC: 35.2 G/DL (ref 31.5–35.7)
MCV RBC AUTO: 85.5 FL (ref 79–97)
MONOCYTES # BLD AUTO: 0.55 10*3/MM3 (ref 0.1–0.9)
MONOCYTES NFR BLD AUTO: 6 % (ref 5–12)
NEUTROPHILS NFR BLD AUTO: 6.36 10*3/MM3 (ref 1.7–7)
NEUTROPHILS NFR BLD AUTO: 68.9 % (ref 42.7–76)
NITRITE UR QL STRIP: NEGATIVE
NRBC BLD AUTO-RTO: 0 /100 WBC (ref 0–0.2)
PH UR STRIP.AUTO: 7 [PH] (ref 5–9)
PLATELET # BLD AUTO: 221 10*3/MM3 (ref 140–450)
PMV BLD AUTO: 10.1 FL (ref 6–12)
POTASSIUM SERPL-SCNC: 3.3 MMOL/L (ref 3.5–5.2)
PROT SERPL-MCNC: 6.8 G/DL (ref 6–8.5)
PROT UR QL STRIP: NEGATIVE
RBC # BLD AUTO: 3.99 10*6/MM3 (ref 3.77–5.28)
RBC # UR: ABNORMAL /HPF
REF LAB TEST METHOD: ABNORMAL
SODIUM SERPL-SCNC: 133 MMOL/L (ref 136–145)
SP GR UR STRIP: 1.02 (ref 1–1.03)
SQUAMOUS #/AREA URNS HPF: ABNORMAL /HPF
UROBILINOGEN UR QL STRIP: ABNORMAL
WBC # BLD AUTO: 9.24 10*3/MM3 (ref 3.4–10.8)
WBC UR QL AUTO: ABNORMAL /HPF
WHOLE BLOOD HOLD SPECIMEN: NORMAL

## 2020-07-08 PROCEDURE — 84702 CHORIONIC GONADOTROPIN TEST: CPT | Performed by: STUDENT IN AN ORGANIZED HEALTH CARE EDUCATION/TRAINING PROGRAM

## 2020-07-08 PROCEDURE — 81001 URINALYSIS AUTO W/SCOPE: CPT | Performed by: STUDENT IN AN ORGANIZED HEALTH CARE EDUCATION/TRAINING PROGRAM

## 2020-07-08 PROCEDURE — 99283 EMERGENCY DEPT VISIT LOW MDM: CPT

## 2020-07-08 PROCEDURE — 25010000003 RHO D IMMUNE GLOBULIN 1500 UNITS SOLUTION PREFILLED SYRINGE: Performed by: STUDENT IN AN ORGANIZED HEALTH CARE EDUCATION/TRAINING PROGRAM

## 2020-07-08 PROCEDURE — 96372 THER/PROPH/DIAG INJ SC/IM: CPT

## 2020-07-08 PROCEDURE — 76801 OB US < 14 WKS SINGLE FETUS: CPT

## 2020-07-08 PROCEDURE — 80053 COMPREHEN METABOLIC PANEL: CPT | Performed by: STUDENT IN AN ORGANIZED HEALTH CARE EDUCATION/TRAINING PROGRAM

## 2020-07-08 PROCEDURE — 36415 COLL VENOUS BLD VENIPUNCTURE: CPT

## 2020-07-08 PROCEDURE — 85025 COMPLETE CBC W/AUTO DIFF WBC: CPT | Performed by: STUDENT IN AN ORGANIZED HEALTH CARE EDUCATION/TRAINING PROGRAM

## 2020-07-08 RX ADMIN — HUMAN RHO(D) IMMUNE GLOBULIN 300 MCG: 300 INJECTION, SOLUTION INTRAMUSCULAR at 20:43

## 2020-07-08 NOTE — ED TRIAGE NOTES
Pt is 12-13 weeks gestation. She states she was dx with uterine bleeding in June and that she is still spotting.

## 2020-07-09 NOTE — ED PROVIDER NOTES
Subjective   35-year-old  female comes to the ER with a chief complaint of vaginal spotting with clots for the last 4 to 6 weeks.  Patient reports being seen in our facility about a month ago for the same complaint.  She continues to have spotting and has not followed up with anyone.  She is waiting to be seen by an OB.  Patient reports being approximately 13 weeks pregnant.  Patient also continues to have abdominal cramping.  No fevers or chills.  No changes in bowel habits.  She just completed an antibiotic course for UTI.  She is Rh-.      History provided by:  Patient   used: No        Review of Systems   Constitutional: Negative for chills, diaphoresis, fatigue and fever.   HENT: Negative for congestion and rhinorrhea.    Respiratory: Negative for cough, shortness of breath and wheezing.    Cardiovascular: Negative for chest pain, palpitations and leg swelling.   Gastrointestinal: Positive for abdominal pain. Negative for diarrhea and nausea.   Genitourinary: Positive for vaginal bleeding. Negative for decreased urine volume, dysuria, flank pain, vaginal discharge and vaginal pain.   Skin: Negative for color change and rash.   Neurological: Negative for dizziness and headaches.   Psychiatric/Behavioral: Negative for agitation. The patient is not nervous/anxious.        Past Medical History:   Diagnosis Date   • Acneiform eruption    • Amenorrhea    • Anxiety    • Bipolar disorder (CMS/HCC)    • Tobacco dependence syndrome    • URI (upper respiratory infection)        No Known Allergies    Past Surgical History:   Procedure Laterality Date   • CERVIX SURGERY      Revision of cervix (2)    incompetent cervix, cerclage    • DILATATION AND CURETTAGE     • PAP SMEAR      benign cellular changes reactive cellular changes. negative for intraepithelial lesion or malignancy       Family History   Problem Relation Age of Onset   • Hypertension Mother    • Hypertension Father    • Lung  "cancer Paternal Grandfather    • Bipolar disorder Other    • Diabetes Other        Social History     Socioeconomic History   • Marital status: Single     Spouse name: Not on file   • Number of children: Not on file   • Years of education: Not on file   • Highest education level: Not on file   Tobacco Use   • Smoking status: Current Every Day Smoker     Packs/day: 0.50     Years: 17.00     Pack years: 8.50     Types: Cigarettes   • Smokeless tobacco: Never Used   Substance and Sexual Activity   • Alcohol use: No   • Drug use: No   • Sexual activity: Yes     Partners: Male           Objective    Vitals:    07/08/20 1848 07/08/20 1856 07/08/20 1858 07/08/20 2015   BP:   130/91 127/74   BP Location:   Left arm Left arm   Patient Position:   Sitting Lying   Pulse: 87   81   Resp: 20   20   Temp:  98.4 °F (36.9 °C)     TempSrc:  Oral     SpO2: 97%   98%   Weight:  107 kg (236 lb 6.4 oz)     Height:  167.6 cm (66\")         Physical Exam   Constitutional: She is oriented to person, place, and time. She appears well-developed and well-nourished. She is active.  Non-toxic appearance. She does not have a sickly appearance. She does not appear ill. No distress.   Resting comfortably in bed on her phone   HENT:   Head: Normocephalic.   Right Ear: External ear normal.   Left Ear: External ear normal.   Mouth/Throat: Mucous membranes are normal.   Eyes: Conjunctivae are normal.   Cardiovascular: Normal rate.   Pulmonary/Chest: Effort normal. No accessory muscle usage. No respiratory distress. She has no decreased breath sounds. She has no wheezes. She exhibits no tenderness.   Abdominal: Soft. Bowel sounds are normal. There is no tenderness (deep palpation). There is no rigidity.   Genitourinary: Rectum normal. Pelvic exam was performed with patient supine. There is bleeding (minimal) in the vagina. No tenderness in the vagina. No vaginal discharge found.   Genitourinary Comments: Closed cervical os.   Neurological: She is alert " and oriented to person, place, and time. She is not disoriented. No cranial nerve deficit (grossly intact). GCS eye subscore is 4. GCS verbal subscore is 5. GCS motor subscore is 6.   Skin: Skin is warm and dry. Capillary refill takes less than 2 seconds. She is not diaphoretic.   Psychiatric: She has a normal mood and affect. Her behavior is normal.   Nursing note and vitals reviewed.      Procedures           ED Course      Results for orders placed or performed during the hospital encounter of 07/08/20   Comprehensive Metabolic Panel   Result Value Ref Range    Glucose 102 (H) 65 - 99 mg/dL    BUN 12 6 - 20 mg/dL    Creatinine 0.80 0.57 - 1.00 mg/dL    Sodium 133 (L) 136 - 145 mmol/L    Potassium 3.3 (L) 3.5 - 5.2 mmol/L    Chloride 99 98 - 107 mmol/L    CO2 24.0 22.0 - 29.0 mmol/L    Calcium 8.8 8.6 - 10.5 mg/dL    Total Protein 6.8 6.0 - 8.5 g/dL    Albumin 3.70 3.50 - 5.20 g/dL    ALT (SGPT) 12 1 - 33 U/L    AST (SGOT) 14 1 - 32 U/L    Alkaline Phosphatase 55 39 - 117 U/L    Total Bilirubin 0.3 0.0 - 1.2 mg/dL    eGFR Non African Amer 82 >60 mL/min/1.73    Globulin 3.1 gm/dL    A/G Ratio 1.2 g/dL    BUN/Creatinine Ratio 15.0 7.0 - 25.0    Anion Gap 10.0 5.0 - 15.0 mmol/L   Urinalysis With Microscopic If Indicated (No Culture) - Urine, Clean Catch   Result Value Ref Range    Color, UA Yellow Yellow, Straw, Dark Yellow, Ines    Appearance, UA Turbid (A) Clear    pH, UA 7.0 5.0 - 9.0    Specific Gravity, UA 1.021 1.003 - 1.030    Glucose, UA Negative Negative    Ketones, UA Negative Negative    Bilirubin, UA Negative Negative    Blood, UA Negative Negative    Protein, UA Negative Negative    Leuk Esterase, UA Small (1+) (A) Negative    Nitrite, UA Negative Negative    Urobilinogen, UA 1.0 E.U./dL 0.2 - 1.0 E.U./dL   CBC Auto Differential   Result Value Ref Range    WBC 9.24 3.40 - 10.80 10*3/mm3    RBC 3.99 3.77 - 5.28 10*6/mm3    Hemoglobin 12.0 12.0 - 15.9 g/dL    Hematocrit 34.1 34.0 - 46.6 %    MCV 85.5 79.0  - 97.0 fL    MCH 30.1 26.6 - 33.0 pg    MCHC 35.2 31.5 - 35.7 g/dL    RDW 13.1 12.3 - 15.4 %    RDW-SD 40.9 37.0 - 54.0 fl    MPV 10.1 6.0 - 12.0 fL    Platelets 221 140 - 450 10*3/mm3    Neutrophil % 68.9 42.7 - 76.0 %    Lymphocyte % 22.7 19.6 - 45.3 %    Monocyte % 6.0 5.0 - 12.0 %    Eosinophil % 1.8 0.3 - 6.2 %    Basophil % 0.3 0.0 - 1.5 %    Immature Grans % 0.3 0.0 - 0.5 %    Neutrophils, Absolute 6.36 1.70 - 7.00 10*3/mm3    Lymphocytes, Absolute 2.10 0.70 - 3.10 10*3/mm3    Monocytes, Absolute 0.55 0.10 - 0.90 10*3/mm3    Eosinophils, Absolute 0.17 0.00 - 0.40 10*3/mm3    Basophils, Absolute 0.03 0.00 - 0.20 10*3/mm3    Immature Grans, Absolute 0.03 0.00 - 0.05 10*3/mm3    nRBC 0.0 0.0 - 0.2 /100 WBC   hCG, Quantitative, Pregnancy   Result Value Ref Range    HCG Quantitative 30,104.00 mIU/mL   Urinalysis, Microscopic Only - Urine, Clean Catch   Result Value Ref Range    RBC, UA 3-5 (A) None Seen /HPF    WBC, UA 21-30 (A) None Seen, 0-2, 3-5 /HPF    Bacteria, UA 1+ (A) None Seen /HPF    Squamous Epithelial Cells, UA 13-20 (A) None Seen, 0-2 /HPF    Hyaline Casts, UA 3-6 None Seen /LPF    Methodology Automated Microscopy    Light Blue Top   Result Value Ref Range    Extra Tube hold for add-on      US Ob < 14 Weeks Single or First Gestation   Final Result   CONCLUSION:   Live 12 weeks and 4 days intrauterine fetus.   Amount of echogenic fluid within normal limits.   Anterior placenta without evidence of previa or abruption.   Fetal heart rate 154.64 beats per minute.       [id number]      Electronically signed by:  Tray Yoon MD  7/8/2020 8:07 PM CDT   Workstation: [id number]                [facility]  Number of Diagnoses or Management Options  Threatened miscarriage: new and requires workup  Vaginal bleeding: new and requires workup  Diagnosis management comments: Patient placed in bed 17 and evaluated by me. Vital signs are stable, afebrile.  Labs obtained and are unremarkable.  UA is contaminated.  OB ultrasound  showed a live 12 weeks and 4-day intrauterine fetus with a heart rate of 155.  Pelvic exam showed minimal blood in the vaginal vault with a closed eyes.  Patient was given a repeat dose of RhoGam.  On reevaluation, patient is alert and resting comfortably. I discussed the results of the emergency department evaluation with the patient.  I recommended primary care and OB follow-up.  Return precautions discussed.       Amount and/or Complexity of Data Reviewed  Clinical lab tests: reviewed and ordered  Tests in the radiology section of CPT®: reviewed and ordered  Tests in the medicine section of CPT®: ordered and reviewed  Decide to obtain previous medical records or to obtain history from someone other than the patient: yes  Review and summarize past medical records: yes  Independent visualization of images, tracings, or specimens: yes    Patient Progress  Patient progress: stable      Final diagnoses:   Threatened miscarriage   Vaginal bleeding            Steve Shay MD  Resident  07/08/20 2032

## 2020-07-29 ENCOUNTER — TELEPHONE (OUTPATIENT)
Dept: OBSTETRICS AND GYNECOLOGY | Facility: CLINIC | Age: 35
End: 2020-07-29

## 2020-07-29 ENCOUNTER — HOSPITAL ENCOUNTER (EMERGENCY)
Facility: HOSPITAL | Age: 35
Discharge: HOME | End: 2020-07-30
Attending: FAMILY MEDICINE | Admitting: FAMILY MEDICINE

## 2020-07-29 DIAGNOSIS — R42 DIZZINESS: ICD-10-CM

## 2020-07-29 DIAGNOSIS — R10.13 EPIGASTRIC PAIN: Primary | ICD-10-CM

## 2020-07-29 DIAGNOSIS — Z34.90 PREGNANCY, UNSPECIFIED GESTATIONAL AGE: ICD-10-CM

## 2020-07-29 PROCEDURE — 96375 TX/PRO/DX INJ NEW DRUG ADDON: CPT

## 2020-07-29 PROCEDURE — 96374 THER/PROPH/DIAG INJ IV PUSH: CPT

## 2020-07-29 PROCEDURE — 99284 EMERGENCY DEPT VISIT MOD MDM: CPT

## 2020-07-29 NOTE — TELEPHONE ENCOUNTER
I tried to call patient to remind her of her appt tomorrow. She did not answer. I left a message for her.

## 2020-07-30 VITALS
SYSTOLIC BLOOD PRESSURE: 117 MMHG | HEART RATE: 82 BPM | OXYGEN SATURATION: 96 % | BODY MASS INDEX: 37.93 KG/M2 | HEIGHT: 66 IN | RESPIRATION RATE: 18 BRPM | TEMPERATURE: 98 F | DIASTOLIC BLOOD PRESSURE: 83 MMHG | WEIGHT: 236 LBS

## 2020-07-30 LAB
ALBUMIN SERPL-MCNC: 3.6 G/DL (ref 3.5–5.2)
ALBUMIN/GLOB SERPL: 1.2 G/DL
ALP SERPL-CCNC: 166 U/L (ref 39–117)
ALT SERPL W P-5'-P-CCNC: 119 U/L (ref 1–33)
ANION GAP SERPL CALCULATED.3IONS-SCNC: 11 MMOL/L (ref 5–15)
AST SERPL-CCNC: 151 U/L (ref 1–32)
BACTERIA UR QL AUTO: ABNORMAL /HPF
BASOPHILS # BLD AUTO: 0.02 10*3/MM3 (ref 0–0.2)
BASOPHILS NFR BLD AUTO: 0.2 % (ref 0–1.5)
BILIRUB SERPL-MCNC: 0.7 MG/DL (ref 0–1.2)
BILIRUB UR QL STRIP: NEGATIVE
BUN SERPL-MCNC: 11 MG/DL (ref 6–20)
BUN/CREAT SERPL: 14.3 (ref 7–25)
CALCIUM SPEC-SCNC: 8.7 MG/DL (ref 8.6–10.5)
CHLORIDE SERPL-SCNC: 101 MMOL/L (ref 98–107)
CLARITY UR: ABNORMAL
CO2 SERPL-SCNC: 24 MMOL/L (ref 22–29)
COLOR UR: ABNORMAL
CREAT SERPL-MCNC: 0.77 MG/DL (ref 0.57–1)
DEPRECATED RDW RBC AUTO: 40.1 FL (ref 37–54)
EOSINOPHIL # BLD AUTO: 0.15 10*3/MM3 (ref 0–0.4)
EOSINOPHIL NFR BLD AUTO: 1.7 % (ref 0.3–6.2)
ERYTHROCYTE [DISTWIDTH] IN BLOOD BY AUTOMATED COUNT: 12.9 % (ref 12.3–15.4)
GFR SERPL CREATININE-BSD FRML MDRD: 86 ML/MIN/1.73
GLOBULIN UR ELPH-MCNC: 2.9 GM/DL
GLUCOSE SERPL-MCNC: 116 MG/DL (ref 65–99)
GLUCOSE UR STRIP-MCNC: NEGATIVE MG/DL
HCG INTACT+B SERPL-ACNC: NORMAL MIU/ML
HCT VFR BLD AUTO: 35.9 % (ref 34–46.6)
HGB BLD-MCNC: 12.5 G/DL (ref 12–15.9)
HGB UR QL STRIP.AUTO: NEGATIVE
HYALINE CASTS UR QL AUTO: ABNORMAL /LPF
IMM GRANULOCYTES # BLD AUTO: 0.03 10*3/MM3 (ref 0–0.05)
IMM GRANULOCYTES NFR BLD AUTO: 0.3 % (ref 0–0.5)
KETONES UR QL STRIP: NEGATIVE
LEUKOCYTE ESTERASE UR QL STRIP.AUTO: ABNORMAL
LIPASE SERPL-CCNC: 17 U/L (ref 13–60)
LYMPHOCYTES # BLD AUTO: 1.72 10*3/MM3 (ref 0.7–3.1)
LYMPHOCYTES NFR BLD AUTO: 19.3 % (ref 19.6–45.3)
MAGNESIUM SERPL-MCNC: 2 MG/DL (ref 1.6–2.6)
MCH RBC QN AUTO: 30 PG (ref 26.6–33)
MCHC RBC AUTO-ENTMCNC: 34.8 G/DL (ref 31.5–35.7)
MCV RBC AUTO: 86.3 FL (ref 79–97)
MONOCYTES # BLD AUTO: 0.7 10*3/MM3 (ref 0.1–0.9)
MONOCYTES NFR BLD AUTO: 7.8 % (ref 5–12)
NEUTROPHILS NFR BLD AUTO: 6.31 10*3/MM3 (ref 1.7–7)
NEUTROPHILS NFR BLD AUTO: 70.7 % (ref 42.7–76)
NITRITE UR QL STRIP: NEGATIVE
NRBC BLD AUTO-RTO: 0 /100 WBC (ref 0–0.2)
PH UR STRIP.AUTO: 7.5 [PH] (ref 5–9)
PLATELET # BLD AUTO: 230 10*3/MM3 (ref 140–450)
PMV BLD AUTO: 10.5 FL (ref 6–12)
POTASSIUM SERPL-SCNC: 3.4 MMOL/L (ref 3.5–5.2)
PROT SERPL-MCNC: 6.5 G/DL (ref 6–8.5)
PROT UR QL STRIP: NEGATIVE
RBC # BLD AUTO: 4.16 10*6/MM3 (ref 3.77–5.28)
RBC # UR: ABNORMAL /HPF
REF LAB TEST METHOD: ABNORMAL
SODIUM SERPL-SCNC: 136 MMOL/L (ref 136–145)
SP GR UR STRIP: 1.02 (ref 1–1.03)
SQUAMOUS #/AREA URNS HPF: ABNORMAL /HPF
UROBILINOGEN UR QL STRIP: ABNORMAL
WBC # BLD AUTO: 8.93 10*3/MM3 (ref 3.4–10.8)
WBC UR QL AUTO: ABNORMAL /HPF
WHOLE BLOOD HOLD SPECIMEN: NORMAL

## 2020-07-30 PROCEDURE — 83690 ASSAY OF LIPASE: CPT | Performed by: FAMILY MEDICINE

## 2020-07-30 PROCEDURE — 96375 TX/PRO/DX INJ NEW DRUG ADDON: CPT

## 2020-07-30 PROCEDURE — 85025 COMPLETE CBC W/AUTO DIFF WBC: CPT | Performed by: FAMILY MEDICINE

## 2020-07-30 PROCEDURE — 83735 ASSAY OF MAGNESIUM: CPT | Performed by: FAMILY MEDICINE

## 2020-07-30 PROCEDURE — 81001 URINALYSIS AUTO W/SCOPE: CPT | Performed by: FAMILY MEDICINE

## 2020-07-30 PROCEDURE — 96374 THER/PROPH/DIAG INJ IV PUSH: CPT

## 2020-07-30 PROCEDURE — 84702 CHORIONIC GONADOTROPIN TEST: CPT | Performed by: FAMILY MEDICINE

## 2020-07-30 PROCEDURE — 25010000002 ONDANSETRON PER 1 MG: Performed by: FAMILY MEDICINE

## 2020-07-30 PROCEDURE — 80053 COMPREHEN METABOLIC PANEL: CPT | Performed by: FAMILY MEDICINE

## 2020-07-30 RX ORDER — PNV NO.95/FERROUS FUM/FOLIC AC 28MG-0.8MG
1 TABLET ORAL DAILY
Qty: 30 TABLET | Refills: 0 | Status: SHIPPED | OUTPATIENT
Start: 2020-07-30 | End: 2020-09-08

## 2020-07-30 RX ORDER — ONDANSETRON 2 MG/ML
4 INJECTION INTRAMUSCULAR; INTRAVENOUS ONCE
Status: COMPLETED | OUTPATIENT
Start: 2020-07-30 | End: 2020-07-30

## 2020-07-30 RX ORDER — FAMOTIDINE 10 MG/ML
20 INJECTION, SOLUTION INTRAVENOUS EVERY 12 HOURS SCHEDULED
Status: DISCONTINUED | OUTPATIENT
Start: 2020-07-30 | End: 2020-07-30 | Stop reason: HOSPADM

## 2020-07-30 RX ORDER — SODIUM CHLORIDE 9 MG/ML
125 INJECTION, SOLUTION INTRAVENOUS CONTINUOUS
Status: DISCONTINUED | OUTPATIENT
Start: 2020-07-30 | End: 2020-07-30 | Stop reason: HOSPADM

## 2020-07-30 RX ORDER — ONDANSETRON 4 MG/1
4 TABLET, ORALLY DISINTEGRATING ORAL EVERY 6 HOURS PRN
Qty: 10 TABLET | Refills: 0 | Status: SHIPPED | OUTPATIENT
Start: 2020-07-30 | End: 2021-08-20

## 2020-07-30 RX ADMIN — SODIUM CHLORIDE, POTASSIUM CHLORIDE, SODIUM LACTATE AND CALCIUM CHLORIDE 1000 ML: 600; 310; 30; 20 INJECTION, SOLUTION INTRAVENOUS at 00:19

## 2020-07-30 RX ADMIN — ONDANSETRON 4 MG: 2 INJECTION INTRAMUSCULAR; INTRAVENOUS at 00:20

## 2020-07-30 RX ADMIN — SODIUM CHLORIDE 125 ML/HR: 9 INJECTION, SOLUTION INTRAVENOUS at 01:41

## 2020-07-30 RX ADMIN — FAMOTIDINE 20 MG: 10 INJECTION, SOLUTION INTRAVENOUS at 00:22

## 2020-08-10 ENCOUNTER — LAB (OUTPATIENT)
Dept: LAB | Facility: HOSPITAL | Age: 35
End: 2020-08-10

## 2020-08-10 ENCOUNTER — INITIAL PRENATAL (OUTPATIENT)
Dept: OBSTETRICS AND GYNECOLOGY | Facility: CLINIC | Age: 35
End: 2020-08-10

## 2020-08-10 VITALS — WEIGHT: 255 LBS | DIASTOLIC BLOOD PRESSURE: 88 MMHG | SYSTOLIC BLOOD PRESSURE: 120 MMHG | BODY MASS INDEX: 41.16 KG/M2

## 2020-08-10 VITALS — WEIGHT: 255 LBS | BODY MASS INDEX: 41.16 KG/M2

## 2020-08-10 DIAGNOSIS — O99.210 MATERNAL OBESITY AFFECTING PREGNANCY, ANTEPARTUM: ICD-10-CM

## 2020-08-10 DIAGNOSIS — O20.9 FIRST TRIMESTER BLEEDING: ICD-10-CM

## 2020-08-10 DIAGNOSIS — Z36.89 ENCOUNTER FOR ULTRASOUND TO ASSESS FETAL ANATOMY AND GROWTH IN TWIN PREGNANCY, ANTEPARTUM: ICD-10-CM

## 2020-08-10 DIAGNOSIS — O10.919 CHRONIC HYPERTENSION DURING PREGNANCY: ICD-10-CM

## 2020-08-10 DIAGNOSIS — O26.892 RH NEGATIVE STATUS DURING PREGNANCY IN SECOND TRIMESTER: ICD-10-CM

## 2020-08-10 DIAGNOSIS — Z3A.16 16 WEEKS GESTATION OF PREGNANCY: Primary | ICD-10-CM

## 2020-08-10 DIAGNOSIS — O30.009 ENCOUNTER FOR ULTRASOUND TO ASSESS FETAL ANATOMY AND GROWTH IN TWIN PREGNANCY, ANTEPARTUM: ICD-10-CM

## 2020-08-10 DIAGNOSIS — Z67.91 RH NEGATIVE STATUS DURING PREGNANCY IN SECOND TRIMESTER: ICD-10-CM

## 2020-08-10 DIAGNOSIS — O09.42 SUPERVISION OF HIGH-RISK PREGNANCY WITH GRAND MULTIPARITY IN SECOND TRIMESTER: ICD-10-CM

## 2020-08-10 DIAGNOSIS — Z34.80 SUPERVISION OF OTHER NORMAL PREGNANCY: Primary | ICD-10-CM

## 2020-08-10 DIAGNOSIS — Z86.39 HISTORY OF HYPERTHYROIDISM: ICD-10-CM

## 2020-08-10 DIAGNOSIS — O99.332 MATERNAL TOBACCO USE IN SECOND TRIMESTER: ICD-10-CM

## 2020-08-10 PROBLEM — O09.40: Status: ACTIVE | Noted: 2020-08-10

## 2020-08-10 PROBLEM — O26.899 RH NEGATIVE STATUS DURING PREGNANCY: Status: ACTIVE | Noted: 2020-08-10

## 2020-08-10 PROBLEM — O99.330 MATERNAL TOBACCO USE: Status: ACTIVE | Noted: 2020-08-10

## 2020-08-10 PROBLEM — R82.5 POSITIVE URINE DRUG SCREEN: Status: ACTIVE | Noted: 2020-08-10

## 2020-08-10 LAB
ABO GROUP BLD: NORMAL
AMPHET+METHAMPHET UR QL: POSITIVE
AMPHETAMINES UR QL: POSITIVE
ANTI-D, PASSIVE: NORMAL
BARBITURATES UR QL SCN: NEGATIVE
BASOPHILS # BLD AUTO: 0.03 10*3/MM3 (ref 0–0.2)
BASOPHILS NFR BLD AUTO: 0.3 % (ref 0–1.5)
BENZODIAZ UR QL SCN: NEGATIVE
BILIRUB UR QL STRIP: NEGATIVE
BLD GP AB SCN SERPL QL: POSITIVE
BUPRENORPHINE SERPL-MCNC: NEGATIVE NG/ML
CANNABINOIDS SERPL QL: NEGATIVE
CLARITY UR: ABNORMAL
COCAINE UR QL: NEGATIVE
COLOR UR: YELLOW
DEPRECATED RDW RBC AUTO: 41.6 FL (ref 37–54)
EOSINOPHIL # BLD AUTO: 0.17 10*3/MM3 (ref 0–0.4)
EOSINOPHIL NFR BLD AUTO: 1.7 % (ref 0.3–6.2)
ERYTHROCYTE [DISTWIDTH] IN BLOOD BY AUTOMATED COUNT: 12.7 % (ref 12.3–15.4)
GLUCOSE UR STRIP-MCNC: NEGATIVE MG/DL
HBA1C MFR BLD: 4.7 % (ref 4.8–5.6)
HBV SURFACE AG SERPL QL IA: NORMAL
HCT VFR BLD AUTO: 32.9 % (ref 34–46.6)
HCV AB SER DONR QL: NORMAL
HGB BLD-MCNC: 11.1 G/DL (ref 12–15.9)
HGB UR QL STRIP.AUTO: NEGATIVE
HIV1+2 AB SER QL: NORMAL
IMM GRANULOCYTES # BLD AUTO: 0.05 10*3/MM3 (ref 0–0.05)
IMM GRANULOCYTES NFR BLD AUTO: 0.5 % (ref 0–0.5)
KETONES UR QL STRIP: NEGATIVE
LEUKOCYTE ESTERASE UR QL STRIP.AUTO: NEGATIVE
LYMPHOCYTES # BLD AUTO: 1.5 10*3/MM3 (ref 0.7–3.1)
LYMPHOCYTES NFR BLD AUTO: 15 % (ref 19.6–45.3)
Lab: NORMAL
MCH RBC QN AUTO: 30.1 PG (ref 26.6–33)
MCHC RBC AUTO-ENTMCNC: 33.7 G/DL (ref 31.5–35.7)
MCV RBC AUTO: 89.2 FL (ref 79–97)
METHADONE UR QL SCN: NEGATIVE
MONOCYTES # BLD AUTO: 0.67 10*3/MM3 (ref 0.1–0.9)
MONOCYTES NFR BLD AUTO: 6.7 % (ref 5–12)
NEUTROPHILS NFR BLD AUTO: 7.56 10*3/MM3 (ref 1.7–7)
NEUTROPHILS NFR BLD AUTO: 75.8 % (ref 42.7–76)
NITRITE UR QL STRIP: NEGATIVE
NRBC BLD AUTO-RTO: 0 /100 WBC (ref 0–0.2)
OPIATES UR QL: NEGATIVE
OXYCODONE UR QL SCN: NEGATIVE
PCP UR QL SCN: NEGATIVE
PH UR STRIP.AUTO: 5.5 [PH] (ref 5–8)
PLATELET # BLD AUTO: 216 10*3/MM3 (ref 140–450)
PMV BLD AUTO: 12.7 FL (ref 6–12)
PROPOXYPH UR QL: NEGATIVE
PROT UR QL STRIP: NEGATIVE
RBC # BLD AUTO: 3.69 10*6/MM3 (ref 3.77–5.28)
RH BLD: NEGATIVE
RPR SER QL: NORMAL
RUBV IGG SERPL IA-ACNC: POSITIVE
SP GR UR STRIP: 1.03 (ref 1–1.03)
T4 FREE SERPL-MCNC: 1.01 NG/DL (ref 0.93–1.7)
TRICYCLICS UR QL SCN: NEGATIVE
TSH SERPL DL<=0.05 MIU/L-ACNC: 2.56 UIU/ML (ref 0.27–4.2)
UROBILINOGEN UR QL STRIP: ABNORMAL
WBC # BLD AUTO: 9.98 10*3/MM3 (ref 3.4–10.8)

## 2020-08-10 PROCEDURE — 99213 OFFICE O/P EST LOW 20 MIN: CPT | Performed by: FAMILY MEDICINE

## 2020-08-10 PROCEDURE — 80081 OBSTETRIC PANEL INC HIV TSTG: CPT | Performed by: FAMILY MEDICINE

## 2020-08-10 PROCEDURE — 86803 HEPATITIS C AB TEST: CPT | Performed by: FAMILY MEDICINE

## 2020-08-10 PROCEDURE — 87591 N.GONORRHOEAE DNA AMP PROB: CPT | Performed by: FAMILY MEDICINE

## 2020-08-10 PROCEDURE — 80306 DRUG TEST PRSMV INSTRMNT: CPT | Performed by: FAMILY MEDICINE

## 2020-08-10 PROCEDURE — 36415 COLL VENOUS BLD VENIPUNCTURE: CPT

## 2020-08-10 PROCEDURE — 86870 RBC ANTIBODY IDENTIFICATION: CPT | Performed by: FAMILY MEDICINE

## 2020-08-10 PROCEDURE — 83036 HEMOGLOBIN GLYCOSYLATED A1C: CPT | Performed by: FAMILY MEDICINE

## 2020-08-10 PROCEDURE — 87661 TRICHOMONAS VAGINALIS AMPLIF: CPT | Performed by: FAMILY MEDICINE

## 2020-08-10 PROCEDURE — 84443 ASSAY THYROID STIM HORMONE: CPT | Performed by: FAMILY MEDICINE

## 2020-08-10 PROCEDURE — 87491 CHLMYD TRACH DNA AMP PROBE: CPT | Performed by: FAMILY MEDICINE

## 2020-08-10 PROCEDURE — 84439 ASSAY OF FREE THYROXINE: CPT | Performed by: FAMILY MEDICINE

## 2020-08-10 PROCEDURE — 81003 URINALYSIS AUTO W/O SCOPE: CPT | Performed by: FAMILY MEDICINE

## 2020-08-10 PROCEDURE — 87086 URINE CULTURE/COLONY COUNT: CPT | Performed by: FAMILY MEDICINE

## 2020-08-10 NOTE — PROGRESS NOTES
I spent approximately 45 minutes with the patient acquiring the health and history intake and discussing topics related to healthy lifestyle. She is unsure of her LMP. However, she has been to the ER this pregnancy. She has had 2 ultrasounds done. Her EDC is 1/19/21. This is her 7th pregnancy. Her first 2 children were born at [facility] by Dr. Uribe. She says she had an incompetent cervix both pregnancies and that a cerclage was placed. She signed a release for us to get the records. Her last 3 children were [facility]. She has had 1 miscarriage in 12/2005, and a D&C was done.  A newob bag is given. The 1st trimester teaching was done with the patient. We discussed a healthy diet and exercise and what is recommended. I also discussed Listeriosis and Toxoplasmosis and what fish to avoid due to high mercury levels. I informed patient not to be in hot tubs, saunas, or tanning beds. We discussed that spotting may occur after intercourse which is common, but if heavy bleeding like a period occurs to call the [facility] or hospital if clinic is closed.  I encouraged her to make an appointment with the dentist if she has not had a dental exam and cleaning in the last 6 months.  I instructed the patient that alcohol, illicit drug use, and tobacco smoking are not recommended in pregnancy.  She smokes 1 pack per day. She says she is not planning to quit. She plans to formula feed her baby. She filled out the health department referral form and depression screening questionnaire. I encouraged the patient to get the TDAP vaccine in the 3rd trimester.   I discussed with the patient that a pediatrician needs to be chosen prior to delivery for the infant to have an appointment scheduled before leaving the hospital.  I informed patient that lab tests will be done today. She says she has history of hyperthyroidism and that she didn't followup with a doctor. I told her that we would check her thyroid levels  today. I told the patient that a UDS will be done today and at the time of delivery. The patient tells me she may not deliver here at [facility]. She may transfer during pregnancy.  All questions were answered at this time.

## 2020-08-10 NOTE — PROGRESS NOTES
[facility]  Obstetrics Visit    CHIEF COMPLAINT:  New prenatal visit    HISTORY OF PRESENT ILLNESS:  Mackenzie Neff is a 35 y.o. y/o  at 16w6d by 1TUS 8w0d on 20. Approximate LMP (Patient's last menstrual period was 2020 (within weeks).). This was a unplanned pregnancy. Reports nausea associated with vomiting early on in the pregnancy but that has resolved.  She had first trimester bleeding and was evaluated in the ER on 2 occassions, has received RhoGAM x2.  Reports no further bleeding since her last ED visit 20.  She has started taking a prenatal vitamin.  Plans to Formula feed.  She reports her blood pressure has been elevated & reports a few headaches.    PRENATAL RISK FACTORS   Problems (from 08/10/20 to present)     Problem Noted Resolved    Supervision of high-risk pregnancy with grand multiparity (>= 5 prior pregnancies delivered at >20 weeks) 8/10/2020 by Billie Roberts MD No    Chronic hypertension during pregnancy 8/10/2020 by Billie Roberts MD No    Maternal obesity affecting pregnancy, antepartum 8/10/2020 by Billie Roberts MD No    Rh negative status during pregnancy 8/10/2020 by Billie Roberts MD No    First trimester bleeding 8/10/2020 by Billie Roberts MD No    Maternal tobacco use 8/10/2020 by Billie Roberts MD No        DATING CRITERIA:   Estimated Date of Delivery: 21 by RigobertoT 8w0d on 20    OBSTETRIC HISTORY:  OB History    Para Term  AB Living   7 5 5   1 5   SAB TAB Ectopic Molar Multiple Live Births             5      # Outcome Date GA Lbr Jaime/2nd Weight Sex Delivery Anes PTL Lv   7 Current            6 Term 14 40w3d  3856 g (8 lb 8 oz) M Vag-Spont EPI N FIDENCIO      Birth Comments: IOL       Complications: History of cervical cerclage, Positive GBS test   5 AB 2009 13w0d             Birth Comments: D&C was done   4 Term 2009 39w0d  3544 g (7 lb 13 oz) F Vag-Spont EPI N  FIDENCIO   3 Term 2007 39w0d  3487 g (7 lb 11 oz) M Vag-Spont EPI N FIDENCIO   2 Term 2005 37w0d  3033 g (6 lb 11 oz) F Vag-Spont EPI Y FIDENCIO      Birth Comments: IOL       Complications: Incompetent cervix in pregnancy   1 Term 2003 37w0d  3374 g (7 lb 7 oz) M Vag-Spont EPI Y FIDENCIO      Birth Comments: IOL       Complications: Incompetent cervix in pregnancy     GYN HISTORY:  Reports h/o sexually transmitted infections/pelvic inflammatory disease - Trichomonas  Denies h/o abnormal pap smears, last pap was   Last Completed Pap Smear       Status Date      PAP SMEAR Done 2/12/2018 LIQUID-BASED PAP SMEAR, SCREENING     Patient has more history with this topic...        Denies h/o gynecologic surgeries, including biopsies of the cervix    PAST MEDICAL HISTORY:  Past Medical History:   Diagnosis Date   • Acneiform eruption    • Amenorrhea    • Anxiety    • Bipolar disorder (CMS/HCC)    • Disease of thyroid gland    • History of incompetent cervix, currently pregnant    • Infectious viral hepatitis     HEPATITIS A   • RhD negative    • Smoker    • Tobacco dependence syndrome    • URI (upper respiratory infection)    • Urogenital trichomoniasis    • Varicella      PAST SURGICAL HISTORY:  Past Surgical History:   Procedure Laterality Date   • CERVIX SURGERY  2005    Revision of cervix (2)    incompetent cervix, cerclage    • DILATATION AND CURETTAGE     • PAP SMEAR      benign cellular changes reactive cellular changes. negative for intraepithelial lesion or malignancy     FAMILY HISTORY:  Family History   Problem Relation Age of Onset   • Hypertension Mother    • Lung cancer Paternal Grandfather    • Bipolar disorder Other    • Diabetes Other    • Asthma Son    • No Known Problems Daughter    • Cancer Maternal Grandmother    • Diabetes Maternal Grandfather    • No Known Problems Son    • No Known Problems Son    • No Known Problems Daughter      SOCIAL HISTORY:  Social History     Socioeconomic History   • Marital status: Single      Spouse name: Not on file   • Number of children: Not on file   • Years of education: Not on file   • Highest education level: Not on file   Tobacco Use   • Smoking status: Current Every Day Smoker     Packs/day: 1.00     Years: 17.00     Pack years: 17.00     Types: Cigarettes   • Smokeless tobacco: Never Used   Substance and Sexual Activity   • Alcohol use: No   • Drug use: Not Currently     Types: Marijuana   • Sexual activity: Yes     Partners: Male     Comment: LAST PAP SMEAR 2/12/18 NEGATIVE, HPV NEGATIVE      GENETIC SCREENING:  Age >36 yo as of LATRELL: no  Thalassemia: no  NTD: no  CHD: no  Down Syndrome/MR/Fragile X/Autism: no  Ashkenazi Yazdanism with [place]-Sachs, [place], familial dysautonomia: no  Sickle cell disease or trait: no  Hemophilia: no  Muscular dystrophy: no  Cystic fibrosis: no  [place]'s chorea: no  Birth defects: no  Genetic/chromosomal disorders: no    INFECTION HISTORY:  TB exposure: no  HSV: no  Illness since LMP: no  Prior GBS infected child: no  STIs: Trichomonas    ALLERGIES:  No Known Allergies  MEDICATIONS:  Prior to Admission medications    Medication Sig Start Date End Date Taking? Authorizing Provider   ondansetron ODT (ZOFRAN-ODT) 4 MG disintegrating tablet Place 1 tablet on the tongue Every 6 (Six) Hours As Needed for Nausea or Vomiting. 7/30/20   Ross Santos MD   Pediatric Multiple Vit-C-FA (FLINSTONES GUMMIES OMEGA-3 DHA PO) Take 1 tablet by mouth Daily.    Provider, MD Beth   Prenatal Vit-Fe Fumarate-FA (PRENATAL VITAMIN 28-0.8) 28-0.8 MG tablet tablet Take 1 tablet by mouth Daily. 7/30/20   Ross Santos MD       REVIEW OF SYSTEMS  GEN: no fever or chills  CVS: no chest pain or palpitations  RESP: no cough, no shortness of breath  BREAST: no masses or nipple discharge  GI: no nausea, no vomiting, no diarrhea, constipation or abdominal pain  : no dysuria, no vaginal discharge, no vaginal bleeding  CNS: no dizziness or lightheadedness, +HA  DERM: no rash,  no lesions  MUSC: no myalgias, no gait disturbance  Psych: no depression or anxiety    PHYSICAL EXAM:   /88   Wt 116 kg (255 lb)   LMP 2020 (Within Weeks)   BMI 41.16 kg/m²   General: Alert, healthy, no distress, well nourished and well developed.  Neurologic: Alert, oriented to person, place, and time.  Gait normal.  Cranial nerves II-XII grossly intact.  HEENT: Normocephalic, atraumatic.  Extraocular muscles intact, pupils equal and reactive x2.    Teeth: Normal hygiene.  Neck: Supple, trachea midline.  Lungs: Clear to auscultation bilaterally, normal effort. No wheezes/rhonchi/rales.  Heart: Regular rate and rhythm.  No murmer, rub or gallop.  Abdomen: Soft, non-tender, non-distended  Skin: No rash, no lesions.  Extremities: No cyanosis, clubbing or edema.  PELVIC EXAM: deferred    Bedside ultrasound performed by myself which shows the findings below:  IUP + cardiac activity    IMPRESSION:  Mackenzie Neff is a 35 y.o.  at 16w6d for a new prenatal visit.    PLAN:  1.  IUP at 16w6d  - Prenatal labs ordered including Type and Screen, CBC, Rubella, RPR, Hepatitis B and C and HIV  - Gonorrhea and Chlamydia cultures collected from urine  - Urinalysis and urine culture obtained  - Dating/viability ultrasound ordered  - Pap smear utd  - Continue prenatal vitamins  - Weight gain counseling performed.   - BMI <18.5: Recommend 28-40 lb weight gain   - BMI 18.5-24.9: 25-35 lb   - BMI 25-29.9: 15-25 lb   - BMI >30: 11-20 lb    - Return to clinic in 4 weeks for return prenatal visit    2.  24hr urine protein, CMP and A1C ordered      Signature  Billie Roberts MD  [facility] [address], [place] [address]  Office: [phone]    This document has been electronically signed by Billie Roberts MD on August 10, 2020 13:06

## 2020-08-11 PROBLEM — E66.01 MATERNAL MORBID OBESITY, ANTEPARTUM (HCC): Status: ACTIVE | Noted: 2020-08-10

## 2020-08-11 LAB
BACTERIA SPEC AEROBE CULT: NORMAL
C TRACH RRNA CVX QL NAA+PROBE: NEGATIVE
HOLD SPECIMEN: NORMAL
N GONORRHOEA RRNA SPEC QL NAA+PROBE: NEGATIVE
TRICHOMONAS VAGINALIS PCR: NEGATIVE

## 2020-09-03 LAB — REF LAB TEST METHOD: NORMAL

## 2020-09-08 ENCOUNTER — ROUTINE PRENATAL (OUTPATIENT)
Dept: OBSTETRICS AND GYNECOLOGY | Facility: CLINIC | Age: 35
End: 2020-09-08

## 2020-09-08 VITALS — BODY MASS INDEX: 42.22 KG/M2 | DIASTOLIC BLOOD PRESSURE: 84 MMHG | WEIGHT: 261.6 LBS | SYSTOLIC BLOOD PRESSURE: 126 MMHG

## 2020-09-08 DIAGNOSIS — E66.01 MATERNAL MORBID OBESITY, ANTEPARTUM (HCC): ICD-10-CM

## 2020-09-08 DIAGNOSIS — R82.5 POSITIVE URINE DRUG SCREEN: ICD-10-CM

## 2020-09-08 DIAGNOSIS — O10.919 CHRONIC HYPERTENSION DURING PREGNANCY: ICD-10-CM

## 2020-09-08 DIAGNOSIS — Z36.2 ENCOUNTER FOR REPEAT ULTRASOUND FOR FETAL HEART RATE: ICD-10-CM

## 2020-09-08 DIAGNOSIS — O99.332 MATERNAL TOBACCO USE IN SECOND TRIMESTER: ICD-10-CM

## 2020-09-08 DIAGNOSIS — Z67.91 RH NEGATIVE STATUS DURING PREGNANCY IN SECOND TRIMESTER: ICD-10-CM

## 2020-09-08 DIAGNOSIS — O99.210 MATERNAL MORBID OBESITY, ANTEPARTUM (HCC): ICD-10-CM

## 2020-09-08 DIAGNOSIS — Z3A.21 21 WEEKS GESTATION OF PREGNANCY: Primary | ICD-10-CM

## 2020-09-08 DIAGNOSIS — O09.42 SUPERVISION OF HIGH-RISK PREGNANCY WITH GRAND MULTIPARITY IN SECOND TRIMESTER: ICD-10-CM

## 2020-09-08 DIAGNOSIS — O26.892 RH NEGATIVE STATUS DURING PREGNANCY IN SECOND TRIMESTER: ICD-10-CM

## 2020-09-08 PROBLEM — O20.9 FIRST TRIMESTER BLEEDING: Status: RESOLVED | Noted: 2020-08-10 | Resolved: 2020-09-08

## 2020-09-08 PROCEDURE — 99213 OFFICE O/P EST LOW 20 MIN: CPT | Performed by: FAMILY MEDICINE

## 2020-09-08 NOTE — PROGRESS NOTES
CC: Prenatal visit    Mackenzie Neff is a 35 y.o.  at 21w0d.  Doing well.  Denies contractions, LOF, or VB.  Reports good FM.  Reports issues with nausea if she does not eat frequent snacks.    Anatomy: variable position, fundal placenta, FHR 153bpm, EFW 428g, subopt CNS/CVS/Ext, female fetus. Findings d/w patient.    /84   Wt 119 kg (261 lb 9.6 oz)   LMP 2020 (Within Weeks)   BMI 42.22 kg/m²   SVE: deferred  Fundal Height (cm): 22 cm  Fetal Heart Rate: 153 US     Problems (from 08/10/20 to present)     Problem Noted Resolved    Supervision of high-risk pregnancy with grand multiparity (>= 5 prior pregnancies delivered at >20 weeks) 8/10/2020 by Billie Roberts MD No    Overview Signed 2020  8:05 AM by Billie Roberts MD     O NEG/ Rubella immune/ GBS @ 36wks  DatinTUS 8w0d 20  Genetics: declines  Tdap: 28wks  Flu: n/a  Anatomy: 20wks  A1C: 4.7  1h Glucola: 28wks  Lab Results   Component Value Date    HGB 11.1 (L) 08/10/2020    HCT 32.9 (L) 08/10/2020     08/10/2020     Feeding Method: Formula feed  BC plan: ?           Chronic hypertension during pregnancy 8/10/2020 by Billie Roberts MD No    Overview Signed 2020  8:06 AM by Billie Roberts MD     Baseline CMP, 24hr urine ordered at initial visit.         Maternal morbid obesity, antepartum (CMS/HCC) 8/10/2020 by Billie Roberts MD No    Rh negative status during pregnancy 8/10/2020 by Billie Roberts MD No    First trimester bleeding 8/10/2020 by Billie Roberts MD No    Maternal tobacco use 8/10/2020 by Billie Roberts MD No    Positive urine drug screen 8/10/2020 by Billie Roberts MD No    Overview Signed 8/10/2020  2:29 PM by Billie Roberts MD     Positive for: Methamphetamine, Amphetamines at initial OB visit               A/P: Mackenzie Neff is a 35 y.o.  at 21w0d.  - RTC in 4 weeks with TAUS  - cramping precautions  given  - encouraged to eat protein rich snacks to maintain stable blood glucose  - COVID policy/precaution reviewed     Diagnosis Plan   1. 21 weeks gestation of pregnancy     2. Supervision of high-risk pregnancy with grand multiparity in second trimester     3. Chronic hypertension during pregnancy     4. Maternal morbid obesity, antepartum (CMS/HCC)     5. Rh negative status during pregnancy in second trimester     6. Maternal tobacco use in second trimester     7. Positive urine drug screen         Signature  Billie Roberts MD  [facility] [place] [address]  Office: [phone]      This document has been electronically signed by Billie Roberts MD on September 8, 2020 14:30

## 2020-09-10 DIAGNOSIS — O30.009 ENCOUNTER FOR ULTRASOUND TO ASSESS FETAL ANATOMY AND GROWTH IN TWIN PREGNANCY, ANTEPARTUM: ICD-10-CM

## 2020-09-10 DIAGNOSIS — Z36.89 ENCOUNTER FOR ULTRASOUND TO ASSESS FETAL ANATOMY AND GROWTH IN TWIN PREGNANCY, ANTEPARTUM: ICD-10-CM

## 2020-10-01 ENCOUNTER — TELEPHONE (OUTPATIENT)
Dept: OBSTETRICS AND GYNECOLOGY | Facility: CLINIC | Age: 35
End: 2020-10-01

## 2020-10-01 NOTE — TELEPHONE ENCOUNTER
Pt called and asked when her appointment was. I informed her it was Oct 06 at 1PM. She called from a number not listed on her chart ([phone]) I asked her did we need to list this number, she said no.

## 2020-10-19 ENCOUNTER — TELEPHONE (OUTPATIENT)
Dept: OBSTETRICS AND GYNECOLOGY | Facility: CLINIC | Age: 35
End: 2020-10-19

## 2020-10-19 NOTE — TELEPHONE ENCOUNTER
"Pt called and said she had missed her appointment on 10-06-20 for an ultrasound and OB follow up. I explained I did not have anything with  this week, she said what about [place]? I told her she was booked. She would have to see a APRN this week. She said \" I definitely wont, I'll just go somewhere else, and hung up the phone.      "

## 2020-12-24 ENCOUNTER — HOSPITAL ENCOUNTER (OUTPATIENT)
Facility: HOSPITAL | Age: 35
Discharge: HOME | End: 2020-12-24
Attending: OBSTETRICS & GYNECOLOGY | Admitting: OBSTETRICS & GYNECOLOGY

## 2020-12-24 VITALS
SYSTOLIC BLOOD PRESSURE: 116 MMHG | DIASTOLIC BLOOD PRESSURE: 74 MMHG | RESPIRATION RATE: 18 BRPM | HEART RATE: 88 BPM | TEMPERATURE: 97.9 F

## 2020-12-24 LAB
ALBUMIN SERPL-MCNC: 2.9 G/DL (ref 3.5–5.2)
ALBUMIN/GLOB SERPL: 0.9 G/DL
ALP SERPL-CCNC: 200 U/L (ref 39–117)
ALT SERPL W P-5'-P-CCNC: 10 U/L (ref 1–33)
ANION GAP SERPL CALCULATED.3IONS-SCNC: 7 MMOL/L (ref 5–15)
AST SERPL-CCNC: 15 U/L (ref 1–32)
BILIRUB SERPL-MCNC: 0.2 MG/DL (ref 0–1.2)
BILIRUB UR QL STRIP: NEGATIVE
BUN SERPL-MCNC: 10 MG/DL (ref 6–20)
BUN/CREAT SERPL: 12 (ref 7–25)
CALCIUM SPEC-SCNC: 8.7 MG/DL (ref 8.6–10.5)
CHLORIDE SERPL-SCNC: 105 MMOL/L (ref 98–107)
CLARITY UR: ABNORMAL
CO2 SERPL-SCNC: 23 MMOL/L (ref 22–29)
COLOR UR: YELLOW
CREAT SERPL-MCNC: 0.83 MG/DL (ref 0.57–1)
DEPRECATED RDW RBC AUTO: 42.6 FL (ref 37–54)
ERYTHROCYTE [DISTWIDTH] IN BLOOD BY AUTOMATED COUNT: 13.7 % (ref 12.3–15.4)
GFR SERPL CREATININE-BSD FRML MDRD: 79 ML/MIN/1.73
GLOBULIN UR ELPH-MCNC: 3.1 GM/DL
GLUCOSE SERPL-MCNC: 87 MG/DL (ref 65–99)
GLUCOSE UR STRIP-MCNC: NEGATIVE MG/DL
HCT VFR BLD AUTO: 31.2 % (ref 34–46.6)
HGB BLD-MCNC: 10.4 G/DL (ref 12–15.9)
HGB UR QL STRIP.AUTO: NEGATIVE
KETONES UR QL STRIP: NEGATIVE
LEUKOCYTE ESTERASE UR QL STRIP.AUTO: NEGATIVE
MCH RBC QN AUTO: 28.9 PG (ref 26.6–33)
MCHC RBC AUTO-ENTMCNC: 33.3 G/DL (ref 31.5–35.7)
MCV RBC AUTO: 86.7 FL (ref 79–97)
NITRITE UR QL STRIP: NEGATIVE
PH UR STRIP.AUTO: 7 [PH] (ref 5–9)
PLATELET # BLD AUTO: 188 10*3/MM3 (ref 140–450)
PMV BLD AUTO: 11.3 FL (ref 6–12)
POTASSIUM SERPL-SCNC: 3.9 MMOL/L (ref 3.5–5.2)
PROT SERPL-MCNC: 6 G/DL (ref 6–8.5)
PROT UR QL STRIP: NEGATIVE
RBC # BLD AUTO: 3.6 10*6/MM3 (ref 3.77–5.28)
SODIUM SERPL-SCNC: 135 MMOL/L (ref 136–145)
SP GR UR STRIP: 1.02 (ref 1–1.03)
UROBILINOGEN UR QL STRIP: ABNORMAL
WBC # BLD AUTO: 10.2 10*3/MM3 (ref 3.4–10.8)

## 2020-12-24 PROCEDURE — 85027 COMPLETE CBC AUTOMATED: CPT | Performed by: OBSTETRICS & GYNECOLOGY

## 2020-12-24 PROCEDURE — 82570 ASSAY OF URINE CREATININE: CPT | Performed by: OBSTETRICS & GYNECOLOGY

## 2020-12-24 PROCEDURE — 36415 COLL VENOUS BLD VENIPUNCTURE: CPT | Performed by: OBSTETRICS & GYNECOLOGY

## 2020-12-24 PROCEDURE — 59025 FETAL NON-STRESS TEST: CPT

## 2020-12-24 PROCEDURE — G0463 HOSPITAL OUTPT CLINIC VISIT: HCPCS

## 2020-12-24 PROCEDURE — 81003 URINALYSIS AUTO W/O SCOPE: CPT | Performed by: OBSTETRICS & GYNECOLOGY

## 2020-12-24 PROCEDURE — 84156 ASSAY OF PROTEIN URINE: CPT | Performed by: OBSTETRICS & GYNECOLOGY

## 2020-12-24 PROCEDURE — 80053 COMPREHEN METABOLIC PANEL: CPT | Performed by: OBSTETRICS & GYNECOLOGY

## 2020-12-25 LAB
CREAT UR-MCNC: 74.7 MG/DL
PROT UR-MCNC: 10 MG/DL
PROT/CREAT UR: 133.9 MG/G CREA (ref 0–200)

## 2020-12-25 NOTE — DISCHARGE INSTR - ACTIVITY
Return for leaking of fluid, vaginal bleeding, decreased fetal movement, intense regular contractions.

## 2020-12-25 NOTE — NURSING NOTE
Pt states she has not been seen in office since November 16th she thinks. Pt stated she missed her appointment with her OB last week . Pt educated on the importance of prenatal visits, pt verbalizes understanding and states she will make an appointment here after holidays.

## 2020-12-25 NOTE — NON STRESS TEST
Mackenzie Neff, a  at 36w2d with an LATRELL of 2021, by Ultrasound, was seen at [facility] DEL[facility] for a nonstress test.    Chief Complaint   Patient presents with   • Elevated Blood Pressure     pt stated she called  on call at [facility] for swelling that would not go away with laying down, pt Stated MD told her to get checked out. Pt would like to be delieved/admitted to [facility] if needed.    • Decreased Fetal Movement     pt states she is not sure if she has been feeling the baby move as much , she just wanted to get seen to be sure everything was fine.        Patient Active Problem List   Diagnosis   • Obesity, morbid, BMI 40.0-49.9 (CMS/HCC)   • Acute cholecystitis   • Supervision of high-risk pregnancy with grand multiparity (>= 5 prior pregnancies delivered at >20 weeks)   • Chronic hypertension during pregnancy   • Maternal morbid obesity, antepartum (CMS/[facility])   • Rh negative status during pregnancy   • Maternal tobacco use   • Positive urine drug screen       Start Time:   Stop Time:     Interpretation A  Nonstress Test Interpretation A: Reactive (20 : Trisha Manning, RN)  Comments A: Reviewed with PATIENCE Zamora RN (20 : Trisha Manning, RN)  No hypertensive blood pressures noted since observation.  Lab work reviewed does not indicate preeclampsia.  Will allow him to go home follow-up with primary obstetrician.  NST reviewed reactive

## 2021-01-02 ENCOUNTER — HOSPITAL ENCOUNTER (OUTPATIENT)
Facility: HOSPITAL | Age: 36
Discharge: HOME | End: 2021-01-02
Attending: OBSTETRICS & GYNECOLOGY | Admitting: OBSTETRICS & GYNECOLOGY

## 2021-01-02 VITALS
OXYGEN SATURATION: 98 % | SYSTOLIC BLOOD PRESSURE: 90 MMHG | TEMPERATURE: 97.7 F | RESPIRATION RATE: 18 BRPM | BODY MASS INDEX: 42.22 KG/M2 | HEART RATE: 87 BPM | DIASTOLIC BLOOD PRESSURE: 50 MMHG | HEIGHT: 66 IN

## 2021-01-02 LAB
CANDIDA ALBICANS: POSITIVE
GARDNERELLA VAGINALIS: POSITIVE
T VAGINALIS DNA VAG QL PROBE+SIG AMP: NEGATIVE

## 2021-01-02 PROCEDURE — 87480 CANDIDA DNA DIR PROBE: CPT | Performed by: OBSTETRICS & GYNECOLOGY

## 2021-01-02 PROCEDURE — 87660 TRICHOMONAS VAGIN DIR PROBE: CPT | Performed by: OBSTETRICS & GYNECOLOGY

## 2021-01-02 PROCEDURE — 59025 FETAL NON-STRESS TEST: CPT

## 2021-01-02 PROCEDURE — 87510 GARDNER VAG DNA DIR PROBE: CPT | Performed by: OBSTETRICS & GYNECOLOGY

## 2021-01-02 PROCEDURE — G0463 HOSPITAL OUTPT CLINIC VISIT: HCPCS

## 2021-01-03 NOTE — NURSING NOTE
RN informed that patient has called out for sitz bath for her pain. Upon entering in to the room pt is noted unhooked from monitors and sitting in bath tub. Pt states she couldn't wait any longer. The pressure in her vagina and bottom are too bad and it helps.

## 2021-01-03 NOTE — NURSING NOTE
RN to room to discuss discharge instructions and pt noted to be soaking in bathtub. RN asked if pt could call out once out of tub and pt refused and requested to review at this time. Discharge instructions provided to patient. RN reviewed medications sent to [facility] for pickup. Signs and symptoms of bacterial vaginosis and yeast discussed and when to seek medical advice. Pt verbalized understanding.

## 2021-01-03 NOTE — DISCHARGE INSTRUCTIONS
Flagyl and Monistat ordered and sent to [facility]. Please take medication as prescribed.   Follow up with your primary care with any concerns.     Return if contractions increase in strength and closeness. (Approx 2-5 minutes apart for at least an hour) Return if you are leaking fluids, have bright red vaginal bleeding, or a decrease in fetal movement.

## 2021-01-03 NOTE — NON STRESS TEST
Mackenzie Neff, a  at 37w4d with an LATRELL of 2021, by Ultrasound, was seen at [facility] for a nonstress test.    Chief Complaint   Patient presents with   • Abdominal Pain     Vaginal pressure that started at approx 1900 and burning. Pt states it has caused her to have multiple episodes of diarrhea. Positive fetal movement, no leaking of fluids, or vaginal bleeding.        Patient Active Problem List   Diagnosis   • Obesity, morbid, BMI 40.0-49.9 (CMS/HCC)   • Acute cholecystitis   • Supervision of high-risk pregnancy with grand multiparity (>= 5 prior pregnancies delivered at >20 weeks)   • Chronic hypertension during pregnancy   • Maternal morbid obesity, antepartum (CMS/[facility])   • Rh negative status during pregnancy   • Maternal tobacco use   • Positive urine drug screen       Start Time:   Stop Time:     Interpretation A  Nonstress Test Interpretation A: Reactive (21 : Reymundo Davis, RN)  Comments A: Reviewed with TREY Tate (21 : Reymundo Davis, RN)        Positive for Bacterial Vaginosis and Yeast

## 2021-01-07 ENCOUNTER — TRANSCRIBE ORDERS (OUTPATIENT)
Dept: LAB | Facility: HOSPITAL | Age: 36
End: 2021-01-07

## 2021-01-07 DIAGNOSIS — Z3A.28 28 WEEKS GESTATION OF PREGNANCY: Primary | ICD-10-CM

## 2021-01-08 ENCOUNTER — LAB (OUTPATIENT)
Dept: LAB | Facility: HOSPITAL | Age: 36
End: 2021-01-08

## 2021-01-08 LAB
ABO GROUP BLD: NORMAL
BLD GP AB SCN SERPL QL: NEGATIVE
DEPRECATED RDW RBC AUTO: 39.2 FL (ref 37–54)
EOSINOPHIL # BLD MANUAL: 0.11 10*3/MM3 (ref 0–0.4)
EOSINOPHIL NFR BLD MANUAL: 1 % (ref 0.3–6.2)
ERYTHROCYTE [DISTWIDTH] IN BLOOD BY AUTOMATED COUNT: 13.2 % (ref 12.3–15.4)
HCT VFR BLD AUTO: 34.4 % (ref 34–46.6)
HGB BLD-MCNC: 11.9 G/DL (ref 12–15.9)
LYMPHOCYTES # BLD MANUAL: 2.43 10*3/MM3 (ref 0.7–3.1)
LYMPHOCYTES NFR BLD MANUAL: 22 % (ref 19.6–45.3)
LYMPHOCYTES NFR BLD MANUAL: 5 % (ref 5–12)
MCH RBC QN AUTO: 28.8 PG (ref 26.6–33)
MCHC RBC AUTO-ENTMCNC: 34.6 G/DL (ref 31.5–35.7)
MCV RBC AUTO: 83.3 FL (ref 79–97)
MONOCYTES # BLD AUTO: 0.55 10*3/MM3 (ref 0.1–0.9)
NEUTROPHILS # BLD AUTO: 7.96 10*3/MM3 (ref 1.7–7)
NEUTROPHILS NFR BLD MANUAL: 72 % (ref 42.7–76)
NRBC BLD AUTO-RTO: 0 /100 WBC (ref 0–0.2)
PLAT MORPH BLD: NORMAL
PLATELET # BLD AUTO: 215 10*3/MM3 (ref 140–450)
PMV BLD AUTO: 13.1 FL (ref 6–12)
RBC # BLD AUTO: 4.13 10*6/MM3 (ref 3.77–5.28)
RBC MORPH BLD: NORMAL
RH BLD: NEGATIVE
WBC # BLD AUTO: 11.05 10*3/MM3 (ref 3.4–10.8)
WBC MORPH BLD: NORMAL

## 2021-01-08 PROCEDURE — 85025 COMPLETE CBC W/AUTO DIFF WBC: CPT | Performed by: OBSTETRICS & GYNECOLOGY

## 2021-01-08 PROCEDURE — 86901 BLOOD TYPING SEROLOGIC RH(D): CPT | Performed by: OBSTETRICS & GYNECOLOGY

## 2021-01-08 PROCEDURE — 86850 RBC ANTIBODY SCREEN: CPT | Performed by: OBSTETRICS & GYNECOLOGY

## 2021-01-08 PROCEDURE — 36415 COLL VENOUS BLD VENIPUNCTURE: CPT | Performed by: OBSTETRICS & GYNECOLOGY

## 2021-01-08 PROCEDURE — 86900 BLOOD TYPING SEROLOGIC ABO: CPT | Performed by: OBSTETRICS & GYNECOLOGY

## 2021-01-08 PROCEDURE — 85007 BL SMEAR W/DIFF WBC COUNT: CPT | Performed by: OBSTETRICS & GYNECOLOGY

## 2021-08-20 PROCEDURE — 87635 SARS-COV-2 COVID-19 AMP PRB: CPT | Performed by: EMERGENCY MEDICINE

## 2022-08-26 ENCOUNTER — APPOINTMENT (OUTPATIENT)
Dept: CT IMAGING | Facility: HOSPITAL | Age: 37
End: 2022-08-26

## 2022-08-26 ENCOUNTER — HOSPITAL ENCOUNTER (INPATIENT)
Facility: HOSPITAL | Age: 37
LOS: 5 days | Discharge: HOME | End: 2022-08-31
Attending: FAMILY MEDICINE | Admitting: INTERNAL MEDICINE

## 2022-08-26 DIAGNOSIS — O99.332 MATERNAL TOBACCO USE IN SECOND TRIMESTER: ICD-10-CM

## 2022-08-26 DIAGNOSIS — E66.01 MATERNAL MORBID OBESITY, ANTEPARTUM: ICD-10-CM

## 2022-08-26 DIAGNOSIS — O99.210 MATERNAL MORBID OBESITY, ANTEPARTUM: ICD-10-CM

## 2022-08-26 DIAGNOSIS — R82.5 POSITIVE URINE DRUG SCREEN: ICD-10-CM

## 2022-08-26 DIAGNOSIS — E66.01 OBESITY, MORBID, BMI 40.0-49.9: ICD-10-CM

## 2022-08-26 DIAGNOSIS — O09.42 SUPERVISION OF HIGH-RISK PREGNANCY WITH GRAND MULTIPARITY IN SECOND TRIMESTER: ICD-10-CM

## 2022-08-26 DIAGNOSIS — K81.0 ACUTE CHOLECYSTITIS: ICD-10-CM

## 2022-08-26 DIAGNOSIS — N28.89 PELVICALIECTASIS: Primary | ICD-10-CM

## 2022-08-26 DIAGNOSIS — N30.00 ACUTE CYSTITIS WITHOUT HEMATURIA: ICD-10-CM

## 2022-08-26 DIAGNOSIS — N13.30 HYDRONEPHROSIS, UNSPECIFIED HYDRONEPHROSIS TYPE: ICD-10-CM

## 2022-08-26 DIAGNOSIS — Z67.91 RH NEGATIVE STATUS DURING PREGNANCY IN SECOND TRIMESTER: ICD-10-CM

## 2022-08-26 DIAGNOSIS — O26.892 RH NEGATIVE STATUS DURING PREGNANCY IN SECOND TRIMESTER: ICD-10-CM

## 2022-08-26 DIAGNOSIS — O10.919 CHRONIC HYPERTENSION DURING PREGNANCY: ICD-10-CM

## 2022-08-26 LAB
ALBUMIN SERPL-MCNC: 3.3 G/DL (ref 3.5–5.2)
ALBUMIN/GLOB SERPL: 1.2 G/DL
ALP SERPL-CCNC: 72 U/L (ref 39–117)
ALT SERPL W P-5'-P-CCNC: 9 U/L (ref 1–33)
ANION GAP SERPL CALCULATED.3IONS-SCNC: 7 MMOL/L (ref 5–15)
AST SERPL-CCNC: 9 U/L (ref 1–32)
BACTERIA UR QL AUTO: ABNORMAL /HPF
BASOPHILS # BLD AUTO: 0.05 10*3/MM3 (ref 0–0.2)
BASOPHILS NFR BLD AUTO: 0.3 % (ref 0–1.5)
BILIRUB SERPL-MCNC: 0.6 MG/DL (ref 0–1.2)
BILIRUB UR QL STRIP: NEGATIVE
BUN SERPL-MCNC: 16 MG/DL (ref 6–20)
BUN/CREAT SERPL: 16.2 (ref 7–25)
CALCIUM SPEC-SCNC: 8.5 MG/DL (ref 8.6–10.5)
CHLORIDE SERPL-SCNC: 108 MMOL/L (ref 98–107)
CLARITY UR: ABNORMAL
CO2 SERPL-SCNC: 24 MMOL/L (ref 22–29)
COLOR UR: YELLOW
CREAT SERPL-MCNC: 0.99 MG/DL (ref 0.57–1)
D-LACTATE SERPL-SCNC: 0.8 MMOL/L (ref 0.5–2)
DEPRECATED RDW RBC AUTO: 44.6 FL (ref 37–54)
EGFRCR SERPLBLD CKD-EPI 2021: 75.9 ML/MIN/1.73
EOSINOPHIL # BLD AUTO: 0.03 10*3/MM3 (ref 0–0.4)
EOSINOPHIL NFR BLD AUTO: 0.2 % (ref 0.3–6.2)
ERYTHROCYTE [DISTWIDTH] IN BLOOD BY AUTOMATED COUNT: 14.4 % (ref 12.3–15.4)
FLUAV SUBTYP SPEC NAA+PROBE: NOT DETECTED
FLUBV RNA ISLT QL NAA+PROBE: NOT DETECTED
GLOBULIN UR ELPH-MCNC: 2.8 GM/DL
GLUCOSE SERPL-MCNC: 106 MG/DL (ref 65–99)
GLUCOSE UR STRIP-MCNC: NEGATIVE MG/DL
HCG SERPL QL: NEGATIVE
HCT VFR BLD AUTO: 31.3 % (ref 34–46.6)
HGB BLD-MCNC: 10.6 G/DL (ref 12–15.9)
HGB UR QL STRIP.AUTO: ABNORMAL
HYALINE CASTS UR QL AUTO: ABNORMAL /LPF
IMM GRANULOCYTES # BLD AUTO: 0.12 10*3/MM3 (ref 0–0.05)
IMM GRANULOCYTES NFR BLD AUTO: 0.7 % (ref 0–0.5)
IRON 24H UR-MRATE: 10 MCG/DL (ref 37–145)
IRON SATN MFR SERPL: 3 % (ref 20–50)
KETONES UR QL STRIP: ABNORMAL
LEUKOCYTE ESTERASE UR QL STRIP.AUTO: ABNORMAL
LIPASE SERPL-CCNC: 11 U/L (ref 13–60)
LYMPHOCYTES # BLD AUTO: 0.7 10*3/MM3 (ref 0.7–3.1)
LYMPHOCYTES NFR BLD AUTO: 3.9 % (ref 19.6–45.3)
MCH RBC QN AUTO: 29 PG (ref 26.6–33)
MCHC RBC AUTO-ENTMCNC: 33.9 G/DL (ref 31.5–35.7)
MCV RBC AUTO: 85.8 FL (ref 79–97)
MONOCYTES # BLD AUTO: 0.91 10*3/MM3 (ref 0.1–0.9)
MONOCYTES NFR BLD AUTO: 5.1 % (ref 5–12)
NEUTROPHILS NFR BLD AUTO: 16.02 10*3/MM3 (ref 1.7–7)
NEUTROPHILS NFR BLD AUTO: 89.8 % (ref 42.7–76)
NITRITE UR QL STRIP: NEGATIVE
NRBC BLD AUTO-RTO: 0 /100 WBC (ref 0–0.2)
PH UR STRIP.AUTO: 5.5 [PH] (ref 5–9)
PLATELET # BLD AUTO: 192 10*3/MM3 (ref 140–450)
PMV BLD AUTO: 10.6 FL (ref 6–12)
POTASSIUM SERPL-SCNC: 3.9 MMOL/L (ref 3.5–5.2)
PROT SERPL-MCNC: 6.1 G/DL (ref 6–8.5)
PROT UR QL STRIP: ABNORMAL
RBC # BLD AUTO: 3.65 10*6/MM3 (ref 3.77–5.28)
RBC # UR STRIP: ABNORMAL /HPF
REF LAB TEST METHOD: ABNORMAL
SARS-COV-2 RNA PNL SPEC NAA+PROBE: NOT DETECTED
SODIUM SERPL-SCNC: 139 MMOL/L (ref 136–145)
SP GR UR STRIP: 1.03 (ref 1–1.03)
SQUAMOUS #/AREA URNS HPF: ABNORMAL /HPF
TIBC SERPL-MCNC: 325 MCG/DL (ref 298–536)
TRANSFERRIN SERPL-MCNC: 218 MG/DL (ref 200–360)
UROBILINOGEN UR QL STRIP: ABNORMAL
WBC # UR STRIP: ABNORMAL /HPF
WBC NRBC COR # BLD: 17.83 10*3/MM3 (ref 3.4–10.8)
WHOLE BLOOD HOLD COAG: NORMAL

## 2022-08-26 PROCEDURE — 87636 SARSCOV2 & INF A&B AMP PRB: CPT | Performed by: FAMILY MEDICINE

## 2022-08-26 PROCEDURE — 83605 ASSAY OF LACTIC ACID: CPT | Performed by: FAMILY MEDICINE

## 2022-08-26 PROCEDURE — 87086 URINE CULTURE/COLONY COUNT: CPT | Performed by: FAMILY MEDICINE

## 2022-08-26 PROCEDURE — 99284 EMERGENCY DEPT VISIT MOD MDM: CPT

## 2022-08-26 PROCEDURE — 84466 ASSAY OF TRANSFERRIN: CPT | Performed by: NURSE PRACTITIONER

## 2022-08-26 PROCEDURE — 25010000002 PIPERACILLIN SOD-TAZOBACTAM PER 1 G: Performed by: FAMILY MEDICINE

## 2022-08-26 PROCEDURE — 25010000002 ONDANSETRON PER 1 MG: Performed by: UROLOGY

## 2022-08-26 PROCEDURE — 25010000002 MORPHINE PER 10 MG: Performed by: UROLOGY

## 2022-08-26 PROCEDURE — 81001 URINALYSIS AUTO W/SCOPE: CPT | Performed by: FAMILY MEDICINE

## 2022-08-26 PROCEDURE — G0378 HOSPITAL OBSERVATION PER HR: HCPCS

## 2022-08-26 PROCEDURE — 25010000002 ONDANSETRON PER 1 MG: Performed by: NURSE PRACTITIONER

## 2022-08-26 PROCEDURE — 83540 ASSAY OF IRON: CPT | Performed by: NURSE PRACTITIONER

## 2022-08-26 PROCEDURE — 80053 COMPREHEN METABOLIC PANEL: CPT | Performed by: FAMILY MEDICINE

## 2022-08-26 PROCEDURE — 25010000002 MORPHINE PER 10 MG: Performed by: INTERNAL MEDICINE

## 2022-08-26 PROCEDURE — 83690 ASSAY OF LIPASE: CPT | Performed by: FAMILY MEDICINE

## 2022-08-26 PROCEDURE — 85025 COMPLETE CBC W/AUTO DIFF WBC: CPT | Performed by: FAMILY MEDICINE

## 2022-08-26 PROCEDURE — 87040 BLOOD CULTURE FOR BACTERIA: CPT | Performed by: FAMILY MEDICINE

## 2022-08-26 PROCEDURE — 25010000002 PIPERACILLIN SOD-TAZOBACTAM PER 1 G: Performed by: UROLOGY

## 2022-08-26 PROCEDURE — 25010000002 PIPERACILLIN SOD-TAZOBACTAM PER 1 G: Performed by: INTERNAL MEDICINE

## 2022-08-26 PROCEDURE — 74176 CT ABD & PELVIS W/O CONTRAST: CPT

## 2022-08-26 PROCEDURE — 25010000002 ONDANSETRON PER 1 MG: Performed by: FAMILY MEDICINE

## 2022-08-26 PROCEDURE — 84703 CHORIONIC GONADOTROPIN ASSAY: CPT | Performed by: FAMILY MEDICINE

## 2022-08-26 PROCEDURE — 25010000002 MORPHINE PER 10 MG: Performed by: FAMILY MEDICINE

## 2022-08-26 RX ORDER — HYDROCODONE BITARTRATE AND ACETAMINOPHEN 5; 325 MG/1; MG/1
1 TABLET ORAL EVERY 6 HOURS PRN
Status: DISCONTINUED | OUTPATIENT
Start: 2022-08-26 | End: 2022-08-26

## 2022-08-26 RX ORDER — HYDROCODONE BITARTRATE AND ACETAMINOPHEN 5; 325 MG/1; MG/1
1 TABLET ORAL EVERY 6 HOURS PRN
Status: DISCONTINUED | OUTPATIENT
Start: 2022-08-26 | End: 2022-08-28

## 2022-08-26 RX ORDER — PROCHLORPERAZINE MALEATE 5 MG/1
5 TABLET ORAL ONCE AS NEEDED
Status: COMPLETED | OUTPATIENT
Start: 2022-08-26 | End: 2022-08-27

## 2022-08-26 RX ORDER — ONDANSETRON 2 MG/ML
4 INJECTION INTRAMUSCULAR; INTRAVENOUS ONCE
Status: COMPLETED | OUTPATIENT
Start: 2022-08-26 | End: 2022-08-26

## 2022-08-26 RX ORDER — MORPHINE SULFATE 2 MG/ML
2 INJECTION, SOLUTION INTRAMUSCULAR; INTRAVENOUS EVERY 4 HOURS PRN
Status: DISCONTINUED | OUTPATIENT
Start: 2022-08-26 | End: 2022-08-28

## 2022-08-26 RX ORDER — SODIUM CHLORIDE 0.9 % (FLUSH) 0.9 %
10 SYRINGE (ML) INJECTION AS NEEDED
Status: DISCONTINUED | OUTPATIENT
Start: 2022-08-26 | End: 2022-08-31 | Stop reason: HOSPADM

## 2022-08-26 RX ORDER — SODIUM CHLORIDE 9 MG/ML
125 INJECTION, SOLUTION INTRAVENOUS CONTINUOUS
Status: DISCONTINUED | OUTPATIENT
Start: 2022-08-26 | End: 2022-08-26

## 2022-08-26 RX ORDER — ACETAMINOPHEN 325 MG/1
650 TABLET ORAL EVERY 6 HOURS PRN
Status: DISCONTINUED | OUTPATIENT
Start: 2022-08-26 | End: 2022-08-31 | Stop reason: HOSPADM

## 2022-08-26 RX ORDER — KETOROLAC TROMETHAMINE 10 MG/1
10 TABLET, FILM COATED ORAL EVERY 6 HOURS PRN
Status: DISCONTINUED | OUTPATIENT
Start: 2022-08-26 | End: 2022-08-28 | Stop reason: SDUPTHER

## 2022-08-26 RX ORDER — ONDANSETRON 2 MG/ML
4 INJECTION INTRAMUSCULAR; INTRAVENOUS EVERY 6 HOURS PRN
Status: DISCONTINUED | OUTPATIENT
Start: 2022-08-26 | End: 2022-08-31 | Stop reason: HOSPADM

## 2022-08-26 RX ORDER — SODIUM CHLORIDE 0.9 % (FLUSH) 0.9 %
10 SYRINGE (ML) INJECTION EVERY 12 HOURS SCHEDULED
Status: DISCONTINUED | OUTPATIENT
Start: 2022-08-26 | End: 2022-08-31 | Stop reason: HOSPADM

## 2022-08-26 RX ORDER — SODIUM CHLORIDE, SODIUM LACTATE, POTASSIUM CHLORIDE, CALCIUM CHLORIDE 600; 310; 30; 20 MG/100ML; MG/100ML; MG/100ML; MG/100ML
75 INJECTION, SOLUTION INTRAVENOUS CONTINUOUS
Status: DISCONTINUED | OUTPATIENT
Start: 2022-08-26 | End: 2022-08-31 | Stop reason: HOSPADM

## 2022-08-26 RX ORDER — FAMOTIDINE 20 MG/1
20 TABLET, FILM COATED ORAL
Status: DISCONTINUED | OUTPATIENT
Start: 2022-08-26 | End: 2022-08-31 | Stop reason: HOSPADM

## 2022-08-26 RX ADMIN — MORPHINE SULFATE 4 MG: 4 INJECTION INTRAVENOUS at 04:33

## 2022-08-26 RX ADMIN — FAMOTIDINE 20 MG: 20 TABLET ORAL at 07:45

## 2022-08-26 RX ADMIN — MORPHINE SULFATE 2 MG: 2 INJECTION, SOLUTION INTRAMUSCULAR; INTRAVENOUS at 10:34

## 2022-08-26 RX ADMIN — PIPERACILLIN AND TAZOBACTAM 4.5 G: 4; .5 INJECTION, POWDER, LYOPHILIZED, FOR SOLUTION INTRAVENOUS; PARENTERAL at 11:42

## 2022-08-26 RX ADMIN — ONDANSETRON 4 MG: 2 INJECTION INTRAMUSCULAR; INTRAVENOUS at 06:32

## 2022-08-26 RX ADMIN — ONDANSETRON 4 MG: 2 INJECTION INTRAMUSCULAR; INTRAVENOUS at 13:58

## 2022-08-26 RX ADMIN — SODIUM CHLORIDE 1000 ML: 9 INJECTION, SOLUTION INTRAVENOUS at 03:35

## 2022-08-26 RX ADMIN — MORPHINE SULFATE 4 MG: 4 INJECTION INTRAVENOUS at 06:33

## 2022-08-26 RX ADMIN — ONDANSETRON 4 MG: 2 INJECTION INTRAMUSCULAR; INTRAVENOUS at 21:05

## 2022-08-26 RX ADMIN — PIPERACILLIN SODIUM AND TAZOBACTAM SODIUM 3.38 G: 3; .375 INJECTION, POWDER, LYOPHILIZED, FOR SOLUTION INTRAVENOUS at 04:36

## 2022-08-26 RX ADMIN — SODIUM CHLORIDE, POTASSIUM CHLORIDE, SODIUM LACTATE AND CALCIUM CHLORIDE 75 ML/HR: 600; 310; 30; 20 INJECTION, SOLUTION INTRAVENOUS at 07:45

## 2022-08-26 RX ADMIN — MORPHINE SULFATE 4 MG: 4 INJECTION INTRAVENOUS at 03:34

## 2022-08-26 RX ADMIN — ONDANSETRON 4 MG: 2 INJECTION INTRAMUSCULAR; INTRAVENOUS at 03:34

## 2022-08-26 RX ADMIN — MORPHINE SULFATE 2 MG: 2 INJECTION, SOLUTION INTRAMUSCULAR; INTRAVENOUS at 15:39

## 2022-08-26 RX ADMIN — MORPHINE SULFATE 2 MG: 2 INJECTION, SOLUTION INTRAMUSCULAR; INTRAVENOUS at 21:05

## 2022-08-26 RX ADMIN — ACETAMINOPHEN 650 MG: 325 TABLET, FILM COATED ORAL at 19:28

## 2022-08-26 RX ADMIN — PIPERACILLIN AND TAZOBACTAM 4.5 G: 4; .5 INJECTION, POWDER, LYOPHILIZED, FOR SOLUTION INTRAVENOUS; PARENTERAL at 18:00

## 2022-08-26 NOTE — PROGRESS NOTES
[facility] Medicine Services  INPATIENT PROGRESS NOTE    Length of Stay: 1  Date of Admission: 8/26/2022  Primary Care Physician: Provider, No Known    Subjective   Chief Complaint: Left flank pain.     HPI: This is a 37-year-old female with past medical history of bipolar disorder, thyroid disease and tobacco abuse that presented to [facility] on 8/26/2022 with complaints of left flank pain.  CT of the abdomen and pelvis indicated severe left-sided hydronephrosis including severe pelviectasis with no significant ureterectasis.      The patient was admitted for urosepsis with acute left pyelonephritis.  Urology was consulted and the patient was started on IV Zosyn, antiemetics and pain control.    Review of Systems   Constitutional: Negative for activity change and fatigue.   HENT: Negative for ear pain and sore throat.    Eyes: Negative for pain and discharge.   Respiratory: Negative for cough and shortness of breath.    Cardiovascular: Negative for chest pain and palpitations.   Gastrointestinal: Negative for abdominal pain and nausea.   Endocrine: Negative for cold intolerance and heat intolerance.   Genitourinary: Positive for flank pain. Negative for difficulty urinating and dysuria.   Musculoskeletal: Negative for arthralgias and gait problem.   Skin: Negative for color change and rash.   Neurological: Negative for dizziness and weakness.   Psychiatric/Behavioral: Negative for agitation and confusion.        Objective    Temp:  [98.2 °F (36.8 °C)-99 °F (37.2 °C)] 99 °F (37.2 °C)  Heart Rate:  [] 89  Resp:  [15-18] 18  BP: ()/(49-80) 114/80    Physical Exam  Constitutional:       Appearance: She is well-developed.   HENT:      Head: Normocephalic and atraumatic.   Eyes:      Pupils: Pupils are equal, round, and reactive to light.   Cardiovascular:      Rate and Rhythm: Normal rate and regular rhythm.   Pulmonary:      Effort: Pulmonary effort is normal.      Breath  sounds: Normal breath sounds.   Abdominal:      General: Bowel sounds are normal.      Palpations: Abdomen is soft.   Musculoskeletal:         General: Normal range of motion.      Cervical back: Normal range of motion and neck supple.   Skin:     General: Skin is warm and dry.   Neurological:      Mental Status: She is alert and oriented to person, place, and time.   Psychiatric:         Behavior: Behavior normal.       Results Review:  I have reviewed the labs, radiology results, and diagnostic studies.    Laboratory Data:   Results from last 7 days   Lab Units 08/26/22  0336   SODIUM mmol/L 139   POTASSIUM mmol/L 3.9   CHLORIDE mmol/L 108*   CO2 mmol/L 24.0   BUN mg/dL 16   CREATININE mg/dL 0.99   GLUCOSE mg/dL 106*   CALCIUM mg/dL 8.5*   BILIRUBIN mg/dL 0.6   ALK PHOS U/L 72   ALT (SGPT) U/L 9   AST (SGOT) U/L 9   ANION GAP mmol/L 7.0     Estimated Creatinine Clearance: 101.7 mL/min (by C-G formula based on SCr of 0.99 mg/dL).          Results from last 7 days   Lab Units 08/26/22  0336   WBC 10*3/mm3 17.83*   HEMOGLOBIN g/dL 10.6*   HEMATOCRIT % 31.3*   PLATELETS 10*3/mm3 192           Culture Data:   No results found for: BLOODCX  No results found for: URINECX  No results found for: RESPCX  No results found for: WOUNDCX  No results found for: STOOLCX  No components found for: BODYFLD    Radiology Data:   Imaging Results (Last 24 Hours)     Procedure Component Value Units Date/Time    CT Abdomen Pelvis Without Contrast [[id number]] Collected: 08/26/22 0412     Updated: 08/26/22 0551    Narrative:      EXAM:    CT Abdomen and Pelvis Without Intravenous Contrast    FACILITY:    [facility]    REFERRING:    ANTHONY GIMENEZ    CLINICAL HISTORY:    37 years, Female; LLQ pain, left flank pain    TECHNIQUE:    Axial computed tomography images of the abdomen and pelvis  without intravenous contrast.  This CT exam was performed using  one or more of the following dose reduction techniques:   automated  exposure control, adjustment of the mA and/or kV  according to patient size, and/or use of iterative reconstruction  technique.    COMPARISON:    5/4/2020    FINDINGS:    Lung bases:  Unremarkable.  No mass.  No consolidation.     ABDOMEN:    Liver:  Unremarkable.    Gallbladder and bile ducts:  Unremarkable.  No calcified  stones.  No ductal dilation.    Pancreas:  Unremarkable.  No ductal dilation.    Spleen:  Unremarkable.  No splenomegaly.    Adrenals:  Unremarkable.  No mass.    Kidneys and ureters:  There is evidence of severe left-sided  hydronephrosis including pelvicaliectasis with no significant  ureterectasis. There is also a prominent degree of left  perinephric stranding/edema with small amount of fluid tracking  along the left paracolic gutter. There is no visualized  obstructing calculus. Urology consultation would be recommended.    Stomach and bowel:  There is mild to moderate increased stool  within the colon.        No obstruction.  No mucosal thickening.     PELVIS:    Appendix:  No findings to suggest acute appendicitis.    Bladder:  The urinary bladder is partially decompressed but  unremarkable in appearance.        No stones.    Reproductive:  Unremarkable as visualized.     ABDOMEN and PELVIS:    Intraperitoneal space:  There is a small amount of free fluid  within the cul-de-sac.        No free air.    Bones/joints:  No acute fracture.  No dislocation.    Soft tissues:  Unremarkable.    Vasculature:  Unremarkable.  No abdominal aortic aneurysm.    Lymph nodes:  Unremarkable.  No enlarged lymph nodes.      Impression:      *  Severe left-sided hydronephrosis including severe  pelvicaliectasis with no significant ureterectasis. There is also  a prominent degree of left perinephric stranding/edema with fluid  tracking along the left paracolic gutter. There is no visualized  obstructing calculus.   *  It is uncertain whether this is associated with recently  passed urinary tract calculus or a  nonvisualized calculus.   *  Urology consultation would be recommended.  *  Small amount of free fluid within the cul-de-sac.  *  Mild to moderate increased stool within the colon.    Electronically signed by:  Jovanny Dye DO  8/26/2022 5:49 AM CDT  Workstation: [id number]          I have reviewed the patient's current medications.     Assessment/Plan     Active Hospital Problems    Diagnosis    • Pelvicaliectasis        Plan:    1.  Urosepsis: Continue IV fluids.  Urine and blood cultures pending.  Continue broad-spectrum antibiotics.  2.  Severe left-sided hydronephrosis/pelviectasis: Urology consult appreciated.  3.  Chronic anemia:  Iron profile pending.     DVT PPx:  SCDs.      Discharge Planning: I expect patient to be discharged to home in 1-2 days.    I confirmed that the patient's Advance Care Plan is present, code status is documented, or surrogate decision maker is listed in the patient's medical record.      I have utilized all available immediate resources to obtain, update, or review the patient's current medications.         This document has been electronically signed by KAISER Duarte on August 26, 2022 16:10 CDT

## 2022-08-26 NOTE — H&P
[facility] Medicine Admission      Date of Admission: 8/26/2022      Primary Care Physician: Provider, No Known      Chief Complaint: left flank pain    HPI:  Patient is a morbidly obese 37-year-old female with known past medical history of bipolar disorder, who presented to ER with complaint of left flank pain.  Patient was given  Zosyn in the ED.  she had significantly abnormal CT of the abdomen and pelvis.  hospitalist service was called for admission of the patient concerning urinary tract infection.    Patient was seen and examined in ED room 11.  Her boyfriend at bedside.  Patient reports yesterday around 2 PM she started having dull intermittent moderate to severe left flank pain with radiation to the back.  Her pain was associated with nausea but no vomiting.  She had chills and fatigue.  She denies having similar episodes in the past.  She denies any particular hematuria or radiation of the pain to groin area.  Pain responds to analgesics.  She denies any fall injury trauma fever headache sore throat chest pain shortness of breath, vomiting, diarrhea , frequency polyuria, constipation.  She denies having similar episodes in the past.    Concurrent Medical History:  has a past medical history of Acneiform eruption, Amenorrhea, Anxiety, Bipolar disorder (CMS/HCC), Disease of thyroid gland, History of incompetent cervix, currently pregnant, Infectious viral hepatitis, RhD negative, Smoker, Tobacco dependence syndrome, URI (upper respiratory infection), Urogenital trichomoniasis, and Varicella.    Past Surgical History:  has a past surgical history that includes Dilation and curettage of uterus; Pap Smear; and Cervix surgery (2005).    Family History: family history includes Asthma in her son; Bipolar disorder in an other family member; Cancer in her maternal grandmother; Diabetes in her maternal grandfather and another family member; Hypertension in her mother; Lung  cancer in her paternal grandfather; No Known Problems in her daughter, daughter, son, and son.     Social History:  reports that she has been smoking cigarettes. She has a 8.50 pack-year smoking history. She has never used smokeless tobacco. She reports previous drug use. Drug: Marijuana. She reports that she does not drink alcohol.    Allergies: No Known Allergies    Medications:   Prior to Admission medications    Medication Sig Start Date End Date Taking? Authorizing Provider   brompheniramine-pseudoephedrine-DM 30-2-10 MG/5ML syrup Take 5 mL by mouth 4 (Four) Times a Day As Needed for Congestion, Cough or Allergies. 8/20/21   Yaron Marshall MD       Review of Systems:  Review of Systems   Constitutional: Positive for chills and fatigue. Negative for diaphoresis and fever.   HENT: Negative for congestion, dental problem, ear pain, facial swelling, rhinorrhea and sinus pressure.    Eyes: Negative for photophobia, discharge, redness, itching and visual disturbance.   Respiratory: Negative for apnea, cough, choking, chest tightness, shortness of breath, wheezing and stridor.    Cardiovascular: Negative for chest pain, palpitations and leg swelling.   Gastrointestinal: Positive for abdominal pain. Negative for abdominal distention, blood in stool, diarrhea, nausea, rectal pain and vomiting. Anal bleeding: left flank pain.   Endocrine: Negative for cold intolerance, heat intolerance, polydipsia, polyphagia and polyuria.   Genitourinary: Negative for difficulty urinating, flank pain, frequency, hematuria and urgency.   Musculoskeletal: Negative for arthralgias, back pain, joint swelling and myalgias.   Skin: Negative for pallor, rash and wound.   Allergic/Immunologic: Negative for environmental allergies and immunocompromised state.   Neurological: Negative for dizziness, tremors, seizures, facial asymmetry, speech difficulty, weakness, light-headedness, numbness and headaches.   Hematological: Negative for  adenopathy. Does not bruise/bleed easily.   Psychiatric/Behavioral: Negative for agitation, behavioral problems and hallucinations. The patient is not nervous/anxious.       Otherwise complete ROS is negative except as mentioned above.    Physical Exam:   Temp:  [98.2 °F (36.8 °C)] 98.2 °F (36.8 °C)  Heart Rate:  [80-94] 94  Resp:  [16-18] 16  BP: ()/(58-73) 95/58  Physical Exam  Constitutional:       General: She is not in acute distress.     Appearance: She is obese. She is not ill-appearing, toxic-appearing or diaphoretic.   HENT:      Head: Normocephalic and atraumatic.      Right Ear: External ear normal.      Left Ear: External ear normal.      Nose: Nose normal.      Mouth/Throat:      Mouth: Mucous membranes are moist.      Pharynx: Oropharynx is clear.   Eyes:      Extraocular Movements: Extraocular movements intact.      Conjunctiva/sclera: Conjunctivae normal.      Pupils: Pupils are equal, round, and reactive to light.   Cardiovascular:      Rate and Rhythm: Normal rate and regular rhythm.      Heart sounds: No murmur heard.    No friction rub. No gallop.   Pulmonary:      Effort: No respiratory distress.      Breath sounds: No stridor. No wheezing or rales.   Chest:      Chest wall: No tenderness.   Abdominal:      General: Abdomen is flat. There is no distension.      Palpations: Abdomen is soft.      Tenderness: There is no abdominal tenderness. There is no guarding or rebound.      Comments: Left flank tenderness   Musculoskeletal:         General: No swelling or tenderness.      Cervical back: No rigidity or tenderness.      Right lower leg: No edema.      Left lower leg: No edema.   Lymphadenopathy:      Cervical: No cervical adenopathy.   Skin:     General: Skin is warm and dry.      Coloration: Skin is not jaundiced.      Findings: No erythema.   Neurological:      Mental Status: She is alert and oriented to person, place, and time. Mental status is at baseline.      Sensory: No sensory  deficit.      Motor: No weakness.      Coordination: Coordination normal.   Psychiatric:         Mood and Affect: Mood normal.         Behavior: Behavior normal.         Judgment: Judgment normal.           Results Reviewed:  I have personally reviewed current lab, radiology, and data and agree with results.  Lab Results (last 24 hours)     Procedure Component Value Units Date/Time    Lactic Acid, Plasma [[id number]]  (Normal) Collected: 08/26/22 0435    Specimen: Blood Updated: 08/26/22 0500     Lactate 0.8 mmol/L     Extra Tubes [[id number]] Collected: 08/26/22 0336    Specimen: Blood, Venous Line Updated: 08/26/22 0447    Narrative:      The following orders were created for panel order Extra Tubes.  Procedure                               Abnormality         Status                     ---------                               -----------         ------                     Light Blue Top[[id number]]                                   Final result                 Please view results for these tests on the individual orders.    Light Blue Top [[id number]] Collected: 08/26/22 0336    Specimen: Blood Updated: 08/26/22 0447     Extra Tube Hold for add-ons.     Comment: Auto resulted       Blood Culture - Blood, Arm, Left [[id number]] Collected: 08/26/22 0435    Specimen: Blood from Arm, Left Updated: 08/26/22 0445    Comprehensive Metabolic Panel [[id number]]  (Abnormal) Collected: 08/26/22 0336    Specimen: Blood Updated: 08/26/22 0405     Glucose 106 mg/dL      BUN 16 mg/dL      Creatinine 0.99 mg/dL      Sodium 139 mmol/L      Potassium 3.9 mmol/L      Chloride 108 mmol/L      CO2 24.0 mmol/L      Calcium 8.5 mg/dL      Total Protein 6.1 g/dL      Albumin 3.30 g/dL      ALT (SGPT) 9 U/L      AST (SGOT) 9 U/L      Alkaline Phosphatase 72 U/L      Total Bilirubin 0.6 mg/dL      Globulin 2.8 gm/dL      A/G Ratio 1.2 g/dL      BUN/Creatinine Ratio 16.2     Anion Gap 7.0 mmol/L      eGFR 75.9 mL/min/1.73      Comment: National  Kidney Foundation and American Society of Nephrology (ASN) Task Force recommended calculation based on the Chronic Kidney Disease Epidemiology Collaboration (CKD-EPI) equation refit without adjustment for race.       Narrative:      GFR Normal >60  Chronic Kidney Disease <60  Kidney Failure <15      Lipase [[id number]]  (Abnormal) Collected: 08/26/22 0336    Specimen: Blood Updated: 08/26/22 0405     Lipase 11 U/L     Urinalysis, Microscopic Only - Urine, Clean Catch [[id number]]  (Abnormal) Collected: 08/26/22 0352    Specimen: Urine, Clean Catch Updated: 08/26/22 0403     RBC, UA 3-5 /HPF      WBC, UA Too Numerous to Count /HPF      Bacteria, UA None Seen /HPF      Squamous Epithelial Cells, UA 3-5 /HPF      Hyaline Casts, UA None Seen /LPF      Methodology Automated Microscopy    Urine Culture - Urine, Urine, Clean Catch [[id number]] Collected: 08/26/22 0352    Specimen: Urine, Clean Catch Updated: 08/26/22 0403    Urinalysis With Culture If Indicated - Urine, Clean Catch [[id number]]  (Abnormal) Collected: 08/26/22 0352    Specimen: Urine, Clean Catch Updated: 08/26/22 0401     Color, UA Yellow     Appearance, UA Cloudy     pH, UA 5.5     Specific Gravity, UA 1.030     Glucose, UA Negative     Ketones, UA Trace     Bilirubin, UA Negative     Blood, UA Moderate (2+)     Protein, UA 30 mg/dL (1+)     Leuk Esterase, UA Large (3+)     Nitrite, UA Negative     Urobilinogen, UA 1.0 E.U./dL    Narrative:      In absence of clinical symptoms, the presence of pyuria, bacteria, and/or nitrites on the urinalysis result does not correlate with infection.    hCG, Serum, Qualitative [[id number]]  (Normal) Collected: 08/26/22 0336    Specimen: Blood Updated: 08/26/22 0400     HCG Qualitative Negative    CBC & Differential [[id number]]  (Abnormal) Collected: 08/26/22 0336    Specimen: Blood Updated: 08/26/22 0343    Narrative:      The following orders were created for panel order CBC & Differential.  Procedure                                Abnormality         Status                     ---------                               -----------         ------                     CBC Auto Differential[[id number]]        Abnormal            Final result                 Please view results for these tests on the individual orders.    CBC Auto Differential [[id number]]  (Abnormal) Collected: 08/26/22 0336    Specimen: Blood Updated: 08/26/22 0343     WBC 17.83 10*3/mm3      RBC 3.65 10*6/mm3      Hemoglobin 10.6 g/dL      Hematocrit 31.3 %      MCV 85.8 fL      MCH 29.0 pg      MCHC 33.9 g/dL      RDW 14.4 %      RDW-SD 44.6 fl      MPV 10.6 fL      Platelets 192 10*3/mm3      Neutrophil % 89.8 %      Lymphocyte % 3.9 %      Monocyte % 5.1 %      Eosinophil % 0.2 %      Basophil % 0.3 %      Immature Grans % 0.7 %      Neutrophils, Absolute 16.02 10*3/mm3      Lymphocytes, Absolute 0.70 10*3/mm3      Monocytes, Absolute 0.91 10*3/mm3      Eosinophils, Absolute 0.03 10*3/mm3      Basophils, Absolute 0.05 10*3/mm3      Immature Grans, Absolute 0.12 10*3/mm3      nRBC 0.0 /100 WBC         Imaging Results (Last 24 Hours)     Procedure Component Value Units Date/Time    CT Abdomen Pelvis Without Contrast [[id number]] Collected: 08/26/22 0412     Updated: 08/26/22 0551    Narrative:      EXAM:    CT Abdomen and Pelvis Without Intravenous Contrast    FACILITY:    [facility]    REFERRING:    ANTHONY GIMENEZ    CLINICAL HISTORY:    37 years, Female; LLQ pain, left flank pain    TECHNIQUE:    Axial computed tomography images of the abdomen and pelvis  without intravenous contrast.  This CT exam was performed using  one or more of the following dose reduction techniques:   automated exposure control, adjustment of the mA and/or kV  according to patient size, and/or use of iterative reconstruction  technique.    COMPARISON:    5/4/2020    FINDINGS:    Lung bases:  Unremarkable.  No mass.  No consolidation.     ABDOMEN:    Liver:  Unremarkable.     Gallbladder and bile ducts:  Unremarkable.  No calcified  stones.  No ductal dilation.    Pancreas:  Unremarkable.  No ductal dilation.    Spleen:  Unremarkable.  No splenomegaly.    Adrenals:  Unremarkable.  No mass.    Kidneys and ureters:  There is evidence of severe left-sided  hydronephrosis including pelvicaliectasis with no significant  ureterectasis. There is also a prominent degree of left  perinephric stranding/edema with small amount of fluid tracking  along the left paracolic gutter. There is no visualized  obstructing calculus. Urology consultation would be recommended.    Stomach and bowel:  There is mild to moderate increased stool  within the colon.        No obstruction.  No mucosal thickening.     PELVIS:    Appendix:  No findings to suggest acute appendicitis.    Bladder:  The urinary bladder is partially decompressed but  unremarkable in appearance.        No stones.    Reproductive:  Unremarkable as visualized.     ABDOMEN and PELVIS:    Intraperitoneal space:  There is a small amount of free fluid  within the cul-de-sac.        No free air.    Bones/joints:  No acute fracture.  No dislocation.    Soft tissues:  Unremarkable.    Vasculature:  Unremarkable.  No abdominal aortic aneurysm.    Lymph nodes:  Unremarkable.  No enlarged lymph nodes.      Impression:      *  Severe left-sided hydronephrosis including severe  pelvicaliectasis with no significant ureterectasis. There is also  a prominent degree of left perinephric stranding/edema with fluid  tracking along the left paracolic gutter. There is no visualized  obstructing calculus.   *  It is uncertain whether this is associated with recently  passed urinary tract calculus or a nonvisualized calculus.   *  Urology consultation would be recommended.  *  Small amount of free fluid within the cul-de-sac.  *  Mild to moderate increased stool within the colon.    Electronically signed by:  Jovanny Dye DO  8/26/2022 5:49 AM CDT  Workstation:  [id number]            Assessment:    Active Hospital Problems    Diagnosis    • Pelvicaliectasis      # Sepsis, early sepsis present on admission cannot be excluded sepsis secondary to urinary tract infection, considering borderline hypotension, leukocytosis, with  source of infection  # Acute left pyelonephritis   # Urinary tract infection   # Pelvicaliectasis  # severe left hydronephrosis  # Leukocytosis, reactive   # Anemia with no sign of active bleeding  # Borderline hypotension  # Morbid obesity with BMI of 41.           Plan:  Place on telemetry  Blood culture and urine cultures were obtained in ED.  Obtain further laboratory work-up  Placed on broad-spectrum IV antibiotic Zosyn, analgesics supportive therapy  Use IV fluid judiciously at this time considering severe left hydronephrosis.  Will obtain urology service consultation  Comorbidities will be treated appropriately  Please see orders for comprehensive plan    I confirmed that the patient's Advance Care Plan is present, code status is documented, or surrogate decision maker is listed in the patient's medical record.     I have utilized all available immediate resources to obtain, update, or review the patient's current medications.     I discussed the patient's findings and my recommendations with: Patient and she agreed with above plan of care.      Saeid Behroozi, MD   08/26/22   06:19 CDT

## 2022-08-26 NOTE — Clinical Note
Level of Care: Med/Surg [1]   Diagnosis: Pelvicaliectasis [939350]   Admitting Physician: BEHROOZI, SAEID [[id number]]   Attending Physician: BEHROOZI, SAEID [[id number]]

## 2022-08-26 NOTE — ED PROVIDER NOTES
Subjective     Abdominal Pain  Pain quality: aching    Pain radiates to:  Does not radiate  Pain severity:  Moderate  Duration:  2 days  Timing:  Constant  Progression:  Worsening  Chronicity:  New  Relieved by:  Nothing  Worsened by:  Nothing  Associated symptoms: chills, fatigue and nausea    Associated symptoms: no chest pain, no cough, no diarrhea, no dysuria, no fever, no shortness of breath, no sore throat and no vomiting        Review of Systems   Constitutional: Positive for chills and fatigue. Negative for appetite change, diaphoresis and fever.   HENT: Negative for congestion, ear discharge, ear pain, nosebleeds, rhinorrhea, sinus pressure, sore throat and trouble swallowing.    Eyes: Negative for discharge and redness.   Respiratory: Negative for apnea, cough, chest tightness, shortness of breath and wheezing.    Cardiovascular: Negative for chest pain.   Gastrointestinal: Positive for abdominal pain and nausea. Negative for diarrhea and vomiting.   Endocrine: Negative for polyuria.   Genitourinary: Negative for dysuria, frequency and urgency.   Musculoskeletal: Negative for myalgias and neck pain.   Skin: Negative for color change and rash.   Allergic/Immunologic: Negative for immunocompromised state.   Neurological: Negative for dizziness, seizures, syncope, weakness, light-headedness and headaches.   Hematological: Negative for adenopathy. Does not bruise/bleed easily.   Psychiatric/Behavioral: Negative for behavioral problems and confusion.   All other systems reviewed and are negative.      Past Medical History:   Diagnosis Date   • Acneiform eruption    • Amenorrhea    • Anxiety    • Bipolar disorder (CMS/HCC)    • Disease of thyroid gland    • History of incompetent cervix, currently pregnant    • Infectious viral hepatitis     HEPATITIS A   • RhD negative    • Smoker    • Tobacco dependence syndrome    • URI (upper respiratory infection)    • Urogenital trichomoniasis    • Varicella        No  Known Allergies    Past Surgical History:   Procedure Laterality Date   • CERVIX SURGERY  2005    Revision of cervix (2)    incompetent cervix, cerclage    • DILATATION AND CURETTAGE     • PAP SMEAR      benign cellular changes reactive cellular changes. negative for intraepithelial lesion or malignancy       Family History   Problem Relation Age of Onset   • Hypertension Mother    • Lung cancer Paternal Grandfather    • Bipolar disorder Other    • Diabetes Other    • Asthma Son    • No Known Problems Daughter    • Cancer Maternal Grandmother    • Diabetes Maternal Grandfather    • No Known Problems Son    • No Known Problems Son    • No Known Problems Daughter        Social History     Socioeconomic History   • Marital status: Single   Tobacco Use   • Smoking status: Current Every Day Smoker     Packs/day: 0.50     Years: 17.00     Pack years: 8.50     Types: Cigarettes   • Smokeless tobacco: Never Used   Substance and Sexual Activity   • Alcohol use: No   • Drug use: Not Currently     Types: Marijuana   • Sexual activity: Yes     Partners: Male     Comment: LAST PAP SMEAR 2/12/18 NEGATIVE, HPV NEGATIVE            Objective   Physical Exam  Vitals and nursing note reviewed.   Constitutional:       Appearance: She is well-developed.   HENT:      Head: Normocephalic and atraumatic.      Nose: Nose normal.   Eyes:      General: No scleral icterus.        Right eye: No discharge.         Left eye: No discharge.      Conjunctiva/sclera: Conjunctivae normal.      Pupils: Pupils are equal, round, and reactive to light.   Neck:      Trachea: No tracheal deviation.   Cardiovascular:      Rate and Rhythm: Normal rate and regular rhythm.      Heart sounds: Normal heart sounds. No murmur heard.  Pulmonary:      Effort: Pulmonary effort is normal. No respiratory distress.      Breath sounds: Normal breath sounds. No stridor. No wheezing or rales.   Abdominal:      General: Bowel sounds are normal. There is no distension.       Palpations: Abdomen is soft. There is no mass.      Tenderness: There is abdominal tenderness in the left lower quadrant. There is left CVA tenderness. There is no guarding or rebound.   Musculoskeletal:      Cervical back: Normal range of motion and neck supple.   Skin:     General: Skin is warm and dry.      Findings: No erythema or rash.   Neurological:      Mental Status: She is alert and oriented to person, place, and time.      Coordination: Coordination normal.   Psychiatric:         Behavior: Behavior normal.         Thought Content: Thought content normal.         Procedures           ED Course                   Labs Reviewed   COMPREHENSIVE METABOLIC PANEL - Abnormal; Notable for the following components:       Result Value    Glucose 106 (*)     Chloride 108 (*)     Calcium 8.5 (*)     Albumin 3.30 (*)     All other components within normal limits    Narrative:     GFR Normal >60  Chronic Kidney Disease <60  Kidney Failure <15     LIPASE - Abnormal; Notable for the following components:    Lipase 11 (*)     All other components within normal limits   URINALYSIS W/ CULTURE IF INDICATED - Abnormal; Notable for the following components:    Appearance, UA Cloudy (*)     Ketones, UA Trace (*)     Blood, UA Moderate (2+) (*)     Protein, UA 30 mg/dL (1+) (*)     Leuk Esterase, UA Large (3+) (*)     All other components within normal limits    Narrative:     In absence of clinical symptoms, the presence of pyuria, bacteria, and/or nitrites on the urinalysis result does not correlate with infection.   CBC WITH AUTO DIFFERENTIAL - Abnormal; Notable for the following components:    WBC 17.83 (*)     RBC 3.65 (*)     Hemoglobin 10.6 (*)     Hematocrit 31.3 (*)     Neutrophil % 89.8 (*)     Lymphocyte % 3.9 (*)     Eosinophil % 0.2 (*)     Immature Grans % 0.7 (*)     Neutrophils, Absolute 16.02 (*)     Monocytes, Absolute 0.91 (*)     Immature Grans, Absolute 0.12 (*)     All other components within normal limits    URINALYSIS, MICROSCOPIC ONLY - Abnormal; Notable for the following components:    RBC, UA 3-5 (*)     WBC, UA Too Numerous to Count (*)     Squamous Epithelial Cells, UA 3-5 (*)     All other components within normal limits   HCG, SERUM, QUALITATIVE - Normal   LACTIC ACID, PLASMA - Normal   URINE CULTURE   BLOOD CULTURE   BLOOD CULTURE   COVID-19 AND FLU A/B, NP SWAB IN TRANSPORT MEDIA 8-12 HR TAT   CBC AND DIFFERENTIAL    Narrative:     The following orders were created for panel order CBC & Differential.  Procedure                               Abnormality         Status                     ---------                               -----------         ------                     CBC Auto Differential[[id number]]        Abnormal            Final result                 Please view results for these tests on the individual orders.   EXTRA TUBES    Narrative:     The following orders were created for panel order Extra Tubes.  Procedure                               Abnormality         Status                     ---------                               -----------         ------                     Light Blue Top[[id number]]                                   Final result                 Please view results for these tests on the individual orders.   LIGHT BLUE TOP       CT Abdomen Pelvis Without Contrast   Final Result   *  Severe left-sided hydronephrosis including severe   pelvicaliectasis with no significant ureterectasis. There is also   a prominent degree of left perinephric stranding/edema with fluid   tracking along the left paracolic gutter. There is no visualized   obstructing calculus.    *  It is uncertain whether this is associated with recently   passed urinary tract calculus or a nonvisualized calculus.    *  Urology consultation would be recommended.   *  Small amount of free fluid within the cul-de-sac.   *  Mild to moderate increased stool within the colon.      Electronically signed by:  Jovanny Dye DO   8/26/2022 5:49 AM CDT   Workstation: [id number]                                       [facility]    Final diagnoses:   Pelvicaliectasis   Acute cystitis without hematuria       ED Disposition  ED Disposition     ED Disposition   Decision to Admit    Condition   --    Comment   Level of Care: Telemetry [5]   Diagnosis: Pelvicaliectasis [[id number]]   Admitting Physician: BEHROOZI, SAEID [[id number]]   Attending Physician: BEHROOZI, SAEID [[id number]]   Certification: I Certify That Inpatient Hospital Services Are Medically Necessary For Greater Than 2 Midnights               No follow-up provider specified.       Medication List      No changes were made to your prescriptions during this visit.          Ross Santos MD  08/26/22 0637

## 2022-08-26 NOTE — LETTER
August 29, 2022     Patient: Mackenzie RIVAS   YOB: 1985   Date of Visit: 8/26/2022       To Whom It May Concern:    Mackenzie RIVAS was admitted on 8-26-22 and currently still admitted in hospital.           Sincerely,        Amy SAAB / BARBER Nieves BSN,RN

## 2022-08-26 NOTE — CONSULTS
[facility]   Consult Note    Patient Name: Mackenzie RIVAS  : 1985  MRN: [id number]  Primary Care Physician:  Provider, No Known  Referring Physician: Ross Santos MD  Date of admission: 2022    Inpatient Urology Consult  Consult performed by: Gregory Steen APRN  Consult ordered by: Behroozi, Saeid, MD        Subjective   Subjective     Reason for Consult/ Chief Complaint: hydronephrosis     History of Present Illness  Mackenzie RIVAS is a 37 y.o. female consulted to Urology for severe left hydronephrosis, presented to the ED for left flank pain, she is a poor historian, recently medicated and does not wish to speak at this time for consultation.  is present, reports pain the last couple of days, admitted for sepsis from pyelonephritis. Reports history of recurrent UTIs.     Review of Systems   Unable to perform ROS: Other (recent pain medication)   Genitourinary: Positive for flank pain. Negative for hematuria.        Personal History     Past Medical History:   Diagnosis Date   • Acneiform eruption    • Amenorrhea    • Anxiety    • Bipolar disorder (HCC)    • Disease of thyroid gland    • History of incompetent cervix, currently pregnant    • Infectious viral hepatitis     HEPATITIS A   • RhD negative    • Smoker    • Tobacco dependence syndrome    • URI (upper respiratory infection)    • Urogenital trichomoniasis    • Varicella        Past Surgical History:   Procedure Laterality Date   • CERVIX SURGERY      Revision of cervix (2)    incompetent cervix, cerclage    • DILATATION AND CURETTAGE     • PAP SMEAR      benign cellular changes reactive cellular changes. negative for intraepithelial lesion or malignancy       Family History: family history includes Asthma in her son; Bipolar disorder in an other family member; Cancer in her maternal grandmother; Diabetes in her maternal grandfather and another family member; Hypertension in her mother; Lung cancer in her paternal  grandfather; No Known Problems in her daughter, daughter, son, and son. Otherwise pertinent FHx was reviewed and not pertinent to current issue.    Social History:  reports that she has been smoking cigarettes. She has a 8.50 pack-year smoking history. She has never used smokeless tobacco. She reports previous drug use. Drug: Marijuana. She reports that she does not drink alcohol.    Home Medications:   brompheniramine-pseudoephedrine-DM    Allergies:  No Known Allergies    Objective    Objective     Vitals:  Temp:  [98.2 °F (36.8 °C)-99 °F (37.2 °C)] 99 °F (37.2 °C)  Heart Rate:  [] 89  Resp:  [15-18] 18  BP: ()/(49-80) 114/80    Physical Exam  Constitutional:       Appearance: She is not ill-appearing.   HENT:      Head: Normocephalic and atraumatic.      Right Ear: External ear normal.      Left Ear: External ear normal.      Nose: Nose normal.      Mouth/Throat:      Mouth: Mucous membranes are moist.   Eyes:      General: No scleral icterus.        Right eye: No discharge.         Left eye: No discharge.      Pupils: Pupils are equal, round, and reactive to light.   Cardiovascular:      Pulses: Normal pulses.   Pulmonary:      Effort: Pulmonary effort is normal. No respiratory distress.   Abdominal:      General: There is no distension.      Palpations: Abdomen is soft.      Tenderness: There is no abdominal tenderness. There is no right CVA tenderness or left CVA tenderness.   Musculoskeletal:         General: No swelling, tenderness or signs of injury.   Skin:     General: Skin is dry.      Capillary Refill: Capillary refill takes less than 2 seconds.      Coloration: Skin is not jaundiced or pale.      Findings: No bruising or erythema.   Neurological:      General: No focal deficit present.      Mental Status: She is alert and oriented to person, place, and time.      GCS: GCS eye subscore is 3. GCS verbal subscore is 5. GCS motor subscore is 6.   Psychiatric:         Attention and Perception:  She is inattentive.         Result Review    Result Review:  I have personally reviewed the results from the time of this admission to 8/26/2022 16:47 CDT and agree with these findings:  [x]  Laboratory list / accordion  [x]  Microbiology  [x]  Radiology  []  EKG/Telemetry   []  Cardiology/Vascular   []  Pathology  []  Old records  []  Other:  Most notable findings include:     Imaging Results (Last 72 Hours)     Procedure Component Value Units Date/Time    CT Abdomen Pelvis Without Contrast [[id number]] Collected: 08/26/22 0412     Updated: 08/26/22 0551    Narrative:      EXAM:    CT Abdomen and Pelvis Without Intravenous Contrast    FACILITY:    [facility]    REFERRING:    ANTHONY GIMENEZ    CLINICAL HISTORY:    37 years, Female; LLQ pain, left flank pain    TECHNIQUE:    Axial computed tomography images of the abdomen and pelvis  without intravenous contrast.  This CT exam was performed using  one or more of the following dose reduction techniques:   automated exposure control, adjustment of the mA and/or kV  according to patient size, and/or use of iterative reconstruction  technique.    COMPARISON:    5/4/2020    FINDINGS:    Lung bases:  Unremarkable.  No mass.  No consolidation.     ABDOMEN:    Liver:  Unremarkable.    Gallbladder and bile ducts:  Unremarkable.  No calcified  stones.  No ductal dilation.    Pancreas:  Unremarkable.  No ductal dilation.    Spleen:  Unremarkable.  No splenomegaly.    Adrenals:  Unremarkable.  No mass.    Kidneys and ureters:  There is evidence of severe left-sided  hydronephrosis including pelvicaliectasis with no significant  ureterectasis. There is also a prominent degree of left  perinephric stranding/edema with small amount of fluid tracking  along the left paracolic gutter. There is no visualized  obstructing calculus. Urology consultation would be recommended.    Stomach and bowel:  There is mild to moderate increased stool  within the colon.         No obstruction.  No mucosal thickening.     PELVIS:    Appendix:  No findings to suggest acute appendicitis.    Bladder:  The urinary bladder is partially decompressed but  unremarkable in appearance.        No stones.    Reproductive:  Unremarkable as visualized.     ABDOMEN and PELVIS:    Intraperitoneal space:  There is a small amount of free fluid  within the cul-de-sac.        No free air.    Bones/joints:  No acute fracture.  No dislocation.    Soft tissues:  Unremarkable.    Vasculature:  Unremarkable.  No abdominal aortic aneurysm.    Lymph nodes:  Unremarkable.  No enlarged lymph nodes.      Impression:      *  Severe left-sided hydronephrosis including severe  pelvicaliectasis with no significant ureterectasis. There is also  a prominent degree of left perinephric stranding/edema with fluid  tracking along the left paracolic gutter. There is no visualized  obstructing calculus.   *  It is uncertain whether this is associated with recently  passed urinary tract calculus or a nonvisualized calculus.   *  Urology consultation would be recommended.  *  Small amount of free fluid within the cul-de-sac.  *  Mild to moderate increased stool within the colon.    Electronically signed by:  Jovanny Dye DO  8/26/2022 5:49 AM CDT  Workstation: [id number]        Lab Results (last 48 hours)     Procedure Component Value Units Date/Time    Iron Profile [[id number]]  (Abnormal) Collected: 08/26/22 0336    Specimen: Blood Updated: 08/26/22 1643     Iron 10 mcg/dL      Iron Saturation 3 %      Transferrin 218 mg/dL      TIBC 325 mcg/dL     Blood Culture - Blood, Arm, Right [[id number]] Collected: 08/26/22 0658    Specimen: Blood from Arm, Right Updated: 08/26/22 0702    COVID-19 and FLU A/B PCR - Swab, Nasopharynx [[id number]]  (Normal) Collected: 08/26/22 0610    Specimen: Swab from Nasopharynx Updated: 08/26/22 0642     COVID19 Not Detected     Influenza A PCR Not Detected     Influenza B PCR Not Detected    Narrative:       Fact sheet for providers: https://www.fda.gov/media/268034/download    Fact sheet for patients: https://www.fda.gov/media/911847/download    Test performed by PCR.    Lactic Acid, Plasma [[id number]]  (Normal) Collected: 08/26/22 0435    Specimen: Blood Updated: 08/26/22 0500     Lactate 0.8 mmol/L     Extra Tubes [[id number]] Collected: 08/26/22 0336    Specimen: Blood, Venous Line Updated: 08/26/22 0447    Narrative:      The following orders were created for panel order Extra Tubes.  Procedure                               Abnormality         Status                     ---------                               -----------         ------                     Light Blue Top[[id number]]                                   Final result                 Please view results for these tests on the individual orders.    Light Blue Top [[id number]] Collected: 08/26/22 0336    Specimen: Blood Updated: 08/26/22 0447     Extra Tube Hold for add-ons.     Comment: Auto resulted       Blood Culture - Blood, Arm, Left [[id number]] Collected: 08/26/22 0435    Specimen: Blood from Arm, Left Updated: 08/26/22 0445    Comprehensive Metabolic Panel [[id number]]  (Abnormal) Collected: 08/26/22 0336    Specimen: Blood Updated: 08/26/22 0405     Glucose 106 mg/dL      BUN 16 mg/dL      Creatinine 0.99 mg/dL      Sodium 139 mmol/L      Potassium 3.9 mmol/L      Chloride 108 mmol/L      CO2 24.0 mmol/L      Calcium 8.5 mg/dL      Total Protein 6.1 g/dL      Albumin 3.30 g/dL      ALT (SGPT) 9 U/L      AST (SGOT) 9 U/L      Alkaline Phosphatase 72 U/L      Total Bilirubin 0.6 mg/dL      Globulin 2.8 gm/dL      A/G Ratio 1.2 g/dL      BUN/Creatinine Ratio 16.2     Anion Gap 7.0 mmol/L      eGFR 75.9 mL/min/1.73      Comment: National Kidney Foundation and American Society of Nephrology (ASN) Task Force recommended calculation based on the Chronic Kidney Disease Epidemiology Collaboration (CKD-EPI) equation refit without adjustment for race.        Narrative:      GFR Normal >60  Chronic Kidney Disease <60  Kidney Failure <15      Lipase [[id number]]  (Abnormal) Collected: 08/26/22 0336    Specimen: Blood Updated: 08/26/22 0405     Lipase 11 U/L     Urinalysis, Microscopic Only - Urine, Clean Catch [[id number]]  (Abnormal) Collected: 08/26/22 0352    Specimen: Urine, Clean Catch Updated: 08/26/22 0403     RBC, UA 3-5 /HPF      WBC, UA Too Numerous to Count /HPF      Bacteria, UA None Seen /HPF      Squamous Epithelial Cells, UA 3-5 /HPF      Hyaline Casts, UA None Seen /LPF      Methodology Automated Microscopy    Urine Culture - Urine, Urine, Clean Catch [[id number]] Collected: 08/26/22 0352    Specimen: Urine, Clean Catch Updated: 08/26/22 0403    Urinalysis With Culture If Indicated - Urine, Clean Catch [[id number]]  (Abnormal) Collected: 08/26/22 0352    Specimen: Urine, Clean Catch Updated: 08/26/22 0401     Color, UA Yellow     Appearance, UA Cloudy     pH, UA 5.5     Specific Gravity, UA 1.030     Glucose, UA Negative     Ketones, UA Trace     Bilirubin, UA Negative     Blood, UA Moderate (2+)     Protein, UA 30 mg/dL (1+)     Leuk Esterase, UA Large (3+)     Nitrite, UA Negative     Urobilinogen, UA 1.0 E.U./dL    Narrative:      In absence of clinical symptoms, the presence of pyuria, bacteria, and/or nitrites on the urinalysis result does not correlate with infection.    hCG, Serum, Qualitative [[id number]]  (Normal) Collected: 08/26/22 0336    Specimen: Blood Updated: 08/26/22 0400     HCG Qualitative Negative    CBC & Differential [[id number]]  (Abnormal) Collected: 08/26/22 0336    Specimen: Blood Updated: 08/26/22 0343    Narrative:      The following orders were created for panel order CBC & Differential.  Procedure                               Abnormality         Status                     ---------                               -----------         ------                     CBC Auto Differential[[id number]]        Abnormal            Final result                  Please view results for these tests on the individual orders.    CBC Auto Differential [[id number]]  (Abnormal) Collected: 08/26/22 0336    Specimen: Blood Updated: 08/26/22 0343     WBC 17.83 10*3/mm3      RBC 3.65 10*6/mm3      Hemoglobin 10.6 g/dL      Hematocrit 31.3 %      MCV 85.8 fL      MCH 29.0 pg      MCHC 33.9 g/dL      RDW 14.4 %      RDW-SD 44.6 fl      MPV 10.6 fL      Platelets 192 10*3/mm3      Neutrophil % 89.8 %      Lymphocyte % 3.9 %      Monocyte % 5.1 %      Eosinophil % 0.2 %      Basophil % 0.3 %      Immature Grans % 0.7 %      Neutrophils, Absolute 16.02 10*3/mm3      Lymphocytes, Absolute 0.70 10*3/mm3      Monocytes, Absolute 0.91 10*3/mm3      Eosinophils, Absolute 0.03 10*3/mm3      Basophils, Absolute 0.05 10*3/mm3      Immature Grans, Absolute 0.12 10*3/mm3      nRBC 0.0 /100 WBC             Assessment & Plan   Assessment / Plan     Brief Patient Summary:  Mackenzie RIVAS is a 37 y.o. female who has severe hydronephrosis with pyelonephritis    Active Hospital Problems:  Active Hospital Problems    Diagnosis    • Pelvicaliectasis      Plan:   Treat UTI  Plan cystoscopy retrograde in 1-2 days    [facility]

## 2022-08-27 ENCOUNTER — ANESTHESIA EVENT (OUTPATIENT)
Dept: PERIOP | Facility: HOSPITAL | Age: 37
End: 2022-08-27

## 2022-08-27 LAB
ANION GAP SERPL CALCULATED.3IONS-SCNC: 6 MMOL/L (ref 5–15)
BACTERIA SPEC AEROBE CULT: NORMAL
BUN SERPL-MCNC: 14 MG/DL (ref 6–20)
BUN/CREAT SERPL: 14.3 (ref 7–25)
CALCIUM SPEC-SCNC: 8.6 MG/DL (ref 8.6–10.5)
CHLORIDE SERPL-SCNC: 105 MMOL/L (ref 98–107)
CO2 SERPL-SCNC: 26 MMOL/L (ref 22–29)
CREAT SERPL-MCNC: 0.98 MG/DL (ref 0.57–1)
DEPRECATED RDW RBC AUTO: 46.3 FL (ref 37–54)
EGFRCR SERPLBLD CKD-EPI 2021: 76.9 ML/MIN/1.73
ERYTHROCYTE [DISTWIDTH] IN BLOOD BY AUTOMATED COUNT: 14.6 % (ref 12.3–15.4)
GLUCOSE SERPL-MCNC: 121 MG/DL (ref 65–99)
HBA1C MFR BLD: 5.1 % (ref 4.8–5.6)
HCT VFR BLD AUTO: 29 % (ref 34–46.6)
HGB BLD-MCNC: 9.6 G/DL (ref 12–15.9)
MAGNESIUM SERPL-MCNC: 1.8 MG/DL (ref 1.6–2.6)
MCH RBC QN AUTO: 28.4 PG (ref 26.6–33)
MCHC RBC AUTO-ENTMCNC: 33.1 G/DL (ref 31.5–35.7)
MCV RBC AUTO: 85.8 FL (ref 79–97)
PLATELET # BLD AUTO: 171 10*3/MM3 (ref 140–450)
PMV BLD AUTO: 10.4 FL (ref 6–12)
POTASSIUM SERPL-SCNC: 3.4 MMOL/L (ref 3.5–5.2)
RBC # BLD AUTO: 3.38 10*6/MM3 (ref 3.77–5.28)
SODIUM SERPL-SCNC: 137 MMOL/L (ref 136–145)
TSH SERPL DL<=0.05 MIU/L-ACNC: 4.6 UIU/ML (ref 0.27–4.2)
WBC NRBC COR # BLD: 15.53 10*3/MM3 (ref 3.4–10.8)

## 2022-08-27 PROCEDURE — 80048 BASIC METABOLIC PNL TOTAL CA: CPT | Performed by: INTERNAL MEDICINE

## 2022-08-27 PROCEDURE — 84443 ASSAY THYROID STIM HORMONE: CPT | Performed by: INTERNAL MEDICINE

## 2022-08-27 PROCEDURE — 36415 COLL VENOUS BLD VENIPUNCTURE: CPT | Performed by: INTERNAL MEDICINE

## 2022-08-27 PROCEDURE — 85027 COMPLETE CBC AUTOMATED: CPT | Performed by: INTERNAL MEDICINE

## 2022-08-27 PROCEDURE — 25010000002 PIPERACILLIN SOD-TAZOBACTAM PER 1 G: Performed by: UROLOGY

## 2022-08-27 PROCEDURE — 25010000002 PIPERACILLIN SOD-TAZOBACTAM PER 1 G: Performed by: INTERNAL MEDICINE

## 2022-08-27 PROCEDURE — 63710000001 PROCHLORPERAZINE MALEATE PER 5 MG: Performed by: INTERNAL MEDICINE

## 2022-08-27 PROCEDURE — 83735 ASSAY OF MAGNESIUM: CPT | Performed by: INTERNAL MEDICINE

## 2022-08-27 PROCEDURE — 83036 HEMOGLOBIN GLYCOSYLATED A1C: CPT | Performed by: INTERNAL MEDICINE

## 2022-08-27 PROCEDURE — G0378 HOSPITAL OBSERVATION PER HR: HCPCS

## 2022-08-27 PROCEDURE — 25010000002 ONDANSETRON PER 1 MG: Performed by: NURSE PRACTITIONER

## 2022-08-27 PROCEDURE — 25010000002 ONDANSETRON PER 1 MG: Performed by: UROLOGY

## 2022-08-27 RX ORDER — DOCUSATE SODIUM 100 MG/1
100 CAPSULE, LIQUID FILLED ORAL 2 TIMES DAILY
Status: DISCONTINUED | OUTPATIENT
Start: 2022-08-27 | End: 2022-08-31 | Stop reason: HOSPADM

## 2022-08-27 RX ORDER — FERROUS SULFATE TAB EC 324 MG (65 MG FE EQUIVALENT) 324 (65 FE) MG
324 TABLET DELAYED RESPONSE ORAL
Status: DISCONTINUED | OUTPATIENT
Start: 2022-08-27 | End: 2022-08-31 | Stop reason: HOSPADM

## 2022-08-27 RX ADMIN — DOCUSATE SODIUM 100 MG: 100 CAPSULE, LIQUID FILLED ORAL at 12:28

## 2022-08-27 RX ADMIN — PIPERACILLIN AND TAZOBACTAM 4.5 G: 4; .5 INJECTION, POWDER, LYOPHILIZED, FOR SOLUTION INTRAVENOUS; PARENTERAL at 16:31

## 2022-08-27 RX ADMIN — HYDROCODONE BITARTRATE AND ACETAMINOPHEN 1 TABLET: 5; 325 TABLET ORAL at 08:19

## 2022-08-27 RX ADMIN — HYDROCODONE BITARTRATE AND ACETAMINOPHEN 1 TABLET: 5; 325 TABLET ORAL at 20:12

## 2022-08-27 RX ADMIN — DOCUSATE SODIUM 100 MG: 100 CAPSULE, LIQUID FILLED ORAL at 20:12

## 2022-08-27 RX ADMIN — PIPERACILLIN AND TAZOBACTAM 4.5 G: 4; .5 INJECTION, POWDER, LYOPHILIZED, FOR SOLUTION INTRAVENOUS; PARENTERAL at 02:09

## 2022-08-27 RX ADMIN — FAMOTIDINE 20 MG: 20 TABLET ORAL at 16:26

## 2022-08-27 RX ADMIN — ONDANSETRON 4 MG: 2 INJECTION INTRAMUSCULAR; INTRAVENOUS at 18:06

## 2022-08-27 RX ADMIN — HYDROCODONE BITARTRATE AND ACETAMINOPHEN 1 TABLET: 5; 325 TABLET ORAL at 14:06

## 2022-08-27 RX ADMIN — KETOROLAC TROMETHAMINE 10 MG: 10 TABLET, FILM COATED ORAL at 22:05

## 2022-08-27 RX ADMIN — FAMOTIDINE 20 MG: 20 TABLET ORAL at 08:19

## 2022-08-27 RX ADMIN — ACETAMINOPHEN 650 MG: 325 TABLET, FILM COATED ORAL at 04:12

## 2022-08-27 RX ADMIN — PROCHLORPERAZINE MALEATE 5 MG: 5 TABLET ORAL at 02:34

## 2022-08-27 RX ADMIN — FERROUS SULFATE TAB EC 324 MG (65 MG FE EQUIVALENT) 324 MG: 324 (65 FE) TABLET DELAYED RESPONSE at 12:28

## 2022-08-27 RX ADMIN — PIPERACILLIN AND TAZOBACTAM 4.5 G: 4; .5 INJECTION, POWDER, LYOPHILIZED, FOR SOLUTION INTRAVENOUS; PARENTERAL at 10:09

## 2022-08-27 RX ADMIN — ACETAMINOPHEN 650 MG: 325 TABLET, FILM COATED ORAL at 16:28

## 2022-08-27 RX ADMIN — HYDROCODONE BITARTRATE AND ACETAMINOPHEN 1 TABLET: 5; 325 TABLET ORAL at 02:08

## 2022-08-27 NOTE — PROGRESS NOTES
[facility] Medicine Services  INPATIENT PROGRESS NOTE    Length of Stay: 1  Date of Admission: 8/26/2022  Primary Care Physician: Provider, No Known    Subjective   Chief Complaint: Left flank pain.     HPI: This is a 37-year-old female with past medical history of bipolar disorder, thyroid disease and tobacco abuse that presented to [facility] on 8/26/2022 with complaints of left flank pain.  CT of the abdomen and pelvis indicated severe left-sided hydronephrosis including severe pelviectasis with no significant ureterectasis.      The patient was admitted for urosepsis with acute left pyelonephritis.  Urology was consulted and the patient was started on IV Zosyn, antiemetics and pain control.    Review of Systems   Constitutional: Negative for activity change and fatigue.   HENT: Negative for ear pain and sore throat.    Eyes: Negative for pain and discharge.   Respiratory: Negative for cough and shortness of breath.    Cardiovascular: Negative for chest pain and palpitations.   Gastrointestinal: Negative for abdominal pain and nausea.   Endocrine: Negative for cold intolerance and heat intolerance.   Genitourinary: Positive for flank pain. Negative for difficulty urinating and dysuria.   Musculoskeletal: Negative for arthralgias and gait problem.   Skin: Negative for color change and rash.   Neurological: Negative for dizziness and weakness.   Psychiatric/Behavioral: Negative for agitation and confusion.        Objective    Temp:  [97.3 °F (36.3 °C)-100.3 °F (37.9 °C)] 97.3 °F (36.3 °C)  Heart Rate:  [] 105  Resp:  [18] 18  BP: (110-123)/(58-74) 123/74    Physical Exam  Constitutional:       Appearance: She is well-developed.   HENT:      Head: Normocephalic and atraumatic.   Eyes:      Pupils: Pupils are equal, round, and reactive to light.   Cardiovascular:      Rate and Rhythm: Normal rate and regular rhythm.   Pulmonary:      Effort: Pulmonary effort is normal.      Breath  sounds: Normal breath sounds.   Abdominal:      General: Bowel sounds are normal.      Palpations: Abdomen is soft.   Musculoskeletal:         General: Normal range of motion.      Cervical back: Normal range of motion and neck supple.   Skin:     General: Skin is warm and dry.   Neurological:      Mental Status: She is alert and oriented to person, place, and time.   Psychiatric:         Behavior: Behavior normal.       Results Review:  I have reviewed the labs, radiology results, and diagnostic studies.    Laboratory Data:   Results from last 7 days   Lab Units 08/27/22  0811 08/26/22  0336   SODIUM mmol/L 137 139   POTASSIUM mmol/L 3.4* 3.9   CHLORIDE mmol/L 105 108*   CO2 mmol/L 26.0 24.0   BUN mg/dL 14 16   CREATININE mg/dL 0.98 0.99   GLUCOSE mg/dL 121* 106*   CALCIUM mg/dL 8.6 8.5*   BILIRUBIN mg/dL  --  0.6   ALK PHOS U/L  --  72   ALT (SGPT) U/L  --  9   AST (SGOT) U/L  --  9   ANION GAP mmol/L 6.0 7.0     Estimated Creatinine Clearance: 102.7 mL/min (by C-G formula based on SCr of 0.98 mg/dL).  Results from last 7 days   Lab Units 08/27/22  0811   MAGNESIUM mg/dL 1.8         Results from last 7 days   Lab Units 08/27/22  0811 08/26/22  0336   WBC 10*3/mm3 15.53* 17.83*   HEMOGLOBIN g/dL 9.6* 10.6*   HEMATOCRIT % 29.0* 31.3*   PLATELETS 10*3/mm3 171 192           Culture Data:   Blood Culture   Date Value Ref Range Status   08/26/2022 No growth at 24 hours  Preliminary   08/26/2022 No growth at 24 hours  Preliminary     Urine Culture   Date Value Ref Range Status   08/26/2022 25,000 CFU/mL Mixed Leila Isolated  Final     No results found for: RESPCX  No results found for: WOUNDCX  No results found for: STOOLCX  No components found for: BODYFLD    Radiology Data:   Imaging Results (Last 24 Hours)     ** No results found for the last 24 hours. **          I have reviewed the patient's current medications.     Assessment/Plan     Active Hospital Problems    Diagnosis    • Pelvicaliectasis        Plan:    1.   Urosepsis: Continue IV fluids.  Urine and blood cultures pending.  Continue broad-spectrum antibiotics.    2.  Severe left-sided hydronephrosis/pelviectasis: Urology consult appreciated. Cystoscopy in am.  3.  Anemia, iron deficiency: Oral iron started.      DVT PPx:  SCDs.      Discharge Planning: I expect patient to be discharged to home in 1-2 days.    I confirmed that the patient's Advance Care Plan is present, code status is documented, or surrogate decision maker is listed in the patient's medical record.      I have utilized all available immediate resources to obtain, update, or review the patient's current medications.         This document has been electronically signed by [facility] Duarte on August 27, 2022 16:39 CDT

## 2022-08-27 NOTE — NURSING NOTE
Patient complained of gas. Advised her to walk in the halls. Informed her that she couldn't leave the floor. Pt voiced understanding. After approximately 10 minutes, it was realized the patient was off the unit. Security and House supervisor were called. Spoke with patient when she returned to her room. Pt admitted that she had been outside. Explained that she would be discharged AMA if she did it again. Also explained that she wouldn't receive any narcotics at this time. She demanded to speak to the doctor. MD aware.

## 2022-08-27 NOTE — PROGRESS NOTES
"   LOS: 1 day   Patient Care Team:  Provider, No Known as PCP - General  Fredy Johns MD (Family Medicine)  Wellington Selby MD as Resident (Family Medicine)  Robbie Garcia MD as Resident (Family Medicine)  Steve Shay MD as Resident (Family Medicine)  Emily Reid MD as Resident (Family Medicine)    Subjective     Subjective:  Symptoms:  Stable.    Diet:  No vomiting.    Pain:  Pain is requiring pain medication.        History taken from: patient chart    Objective     Vital Signs  Temp:  [98.3 °F (36.8 °C)-100.3 °F (37.9 °C)] 100.3 °F (37.9 °C)  Heart Rate:  [] 94  Resp:  [16-18] 18  BP: ()/(58-80) 116/74    Objective:  General Appearance:  In no acute distress.    Vital signs: (most recent): Blood pressure 116/74, pulse 94, temperature 100.3 °F (37.9 °C), temperature source Oral, resp. rate 18, height 167.6 cm (66\"), weight 116 kg (256 lb), last menstrual period 08/08/2022, SpO2 100 %, not currently breastfeeding.  (TMAX 100.3).    Output: Producing urine.    Lungs:  Normal effort.    Neurological: Patient is alert and oriented to person, place and time.    Pupils:  Pupils are equal, round, and reactive to light.    Skin:  No rash, ecchymosis or cyanosis.             Results Review:    Lab Results (last 24 hours)     Procedure Component Value Units Date/Time    Hemoglobin A1c [[id number]]  (Normal) Collected: 08/27/22 0811    Specimen: Blood Updated: 08/27/22 0843     Hemoglobin A1C 5.10 %     Narrative:      Hemoglobin A1C Ranges:    Increased Risk for Diabetes  5.7% to 6.4%  Diabetes                     >= 6.5%  Diabetic Goal                < 7.0%    TSH [[id number]]  (Abnormal) Collected: 08/27/22 0811    Specimen: Blood Updated: 08/27/22 0842     TSH 4.600 uIU/mL     Basic Metabolic Panel [[id number]]  (Abnormal) Collected: 08/27/22 0811    Specimen: Blood Updated: 08/27/22 0836     Glucose 121 mg/dL      BUN 14 mg/dL      Creatinine 0.98 mg/dL      Sodium 137 mmol/L      " Potassium 3.4 mmol/L      Chloride 105 mmol/L      CO2 26.0 mmol/L      Calcium 8.6 mg/dL      BUN/Creatinine Ratio 14.3     Anion Gap 6.0 mmol/L      eGFR 76.9 mL/min/1.73      Comment: National Kidney Foundation and American Society of Nephrology (ASN) Task Force recommended calculation based on the Chronic Kidney Disease Epidemiology Collaboration (CKD-EPI) equation refit without adjustment for race.       Narrative:      GFR Normal >60  Chronic Kidney Disease <60  Kidney Failure <15      Magnesium [[id number]]  (Normal) Collected: 08/27/22 0811    Specimen: Blood Updated: 08/27/22 0836     Magnesium 1.8 mg/dL     CBC (No Diff) [[id number]]  (Abnormal) Collected: 08/27/22 0811    Specimen: Blood Updated: 08/27/22 0817     WBC 15.53 10*3/mm3      RBC 3.38 10*6/mm3      Hemoglobin 9.6 g/dL      Hematocrit 29.0 %      MCV 85.8 fL      MCH 28.4 pg      MCHC 33.1 g/dL      RDW 14.6 %      RDW-SD 46.3 fl      MPV 10.4 fL      Platelets 171 10*3/mm3     Blood Culture - Blood, Arm, Right [[id number]]  (Normal) Collected: 08/26/22 0658    Specimen: Blood from Arm, Right Updated: 08/27/22 0717     Blood Culture No growth at 24 hours    Blood Culture - Blood, Arm, Left [[id number]]  (Normal) Collected: 08/26/22 0435    Specimen: Blood from Arm, Left Updated: 08/27/22 0448     Blood Culture No growth at 24 hours    Iron Profile [[id number]]  (Abnormal) Collected: 08/26/22 0336    Specimen: Blood Updated: 08/26/22 1643     Iron 10 mcg/dL      Iron Saturation 3 %      Transferrin 218 mg/dL      TIBC 325 mcg/dL          Imaging Results (Last 24 Hours)     ** No results found for the last 24 hours. **           I reviewed the patient's new clinical results.  I reviewed the patient's new imaging results and agree with the interpretation.  I reviewed the patient's other test results and agree with the interpretation      Assessment & Plan       Pelvicaliectasis      Assessment & Plan    Mackenzie RIVAS is a 37 y.o. female who has  severe left hydronephrosis with pyelonephritis,   Estimated Creatinine Clearance: 102.7 mL/min (by C-G formula based on SCr of 0.98 mg/dL).  Plan NPO after midnight for cystoscopy left retrograde J-stent placement in AM with Dr. Abraham.     Gregory Steen, [facility]  08/27/22  10:16 CDT

## 2022-08-27 NOTE — PLAN OF CARE
Goal Outcome Evaluation:  Plan of Care Reviewed With: patient        Progress: no change  Outcome Evaluation: VSS. Patient is frequently asking for pain medication. States that morphine is not effective. Patient left floor after being advised not to. Pt was upset when narcotic pain reliever was denied. Pt requested to speal to MD. MD aware.

## 2022-08-28 ENCOUNTER — APPOINTMENT (OUTPATIENT)
Dept: GENERAL RADIOLOGY | Facility: HOSPITAL | Age: 37
End: 2022-08-28

## 2022-08-28 ENCOUNTER — ANESTHESIA (OUTPATIENT)
Dept: PERIOP | Facility: HOSPITAL | Age: 37
End: 2022-08-28

## 2022-08-28 PROCEDURE — C2617 STENT, NON-COR, TEM W/O DEL: HCPCS | Performed by: UROLOGY

## 2022-08-28 PROCEDURE — 25010000002 HYDROMORPHONE 1 MG/ML SOLUTION: Performed by: NURSE ANESTHETIST, CERTIFIED REGISTERED

## 2022-08-28 PROCEDURE — 87077 CULTURE AEROBIC IDENTIFY: CPT | Performed by: UROLOGY

## 2022-08-28 PROCEDURE — 0T778DZ DILATION OF LEFT URETER WITH INTRALUMINAL DEVICE, VIA NATURAL OR ARTIFICIAL OPENING ENDOSCOPIC: ICD-10-PCS | Performed by: UROLOGY

## 2022-08-28 PROCEDURE — 25010000002 PIPERACILLIN SOD-TAZOBACTAM PER 1 G: Performed by: INTERNAL MEDICINE

## 2022-08-28 PROCEDURE — 25010000002 PROPOFOL 10 MG/ML EMULSION: Performed by: NURSE ANESTHETIST, CERTIFIED REGISTERED

## 2022-08-28 PROCEDURE — 25010000002 PIPERACILLIN SOD-TAZOBACTAM PER 1 G: Performed by: UROLOGY

## 2022-08-28 PROCEDURE — BT1F1ZZ FLUOROSCOPY OF LEFT KIDNEY, URETER AND BLADDER USING LOW OSMOLAR CONTRAST: ICD-10-PCS | Performed by: UROLOGY

## 2022-08-28 PROCEDURE — 25010000002 MIDAZOLAM PER 1 MG: Performed by: NURSE ANESTHETIST, CERTIFIED REGISTERED

## 2022-08-28 PROCEDURE — 87086 URINE CULTURE/COLONY COUNT: CPT | Performed by: UROLOGY

## 2022-08-28 PROCEDURE — C1769 GUIDE WIRE: HCPCS | Performed by: UROLOGY

## 2022-08-28 PROCEDURE — 25010000002 IOPAMIDOL 61 % SOLUTION: Performed by: UROLOGY

## 2022-08-28 PROCEDURE — 25010000002 ONDANSETRON PER 1 MG: Performed by: UROLOGY

## 2022-08-28 PROCEDURE — C1758 CATHETER, URETERAL: HCPCS | Performed by: UROLOGY

## 2022-08-28 PROCEDURE — 25010000002 FENTANYL CITRATE (PF) 50 MCG/ML SOLUTION: Performed by: NURSE ANESTHETIST, CERTIFIED REGISTERED

## 2022-08-28 PROCEDURE — 87186 SC STD MICRODIL/AGAR DIL: CPT | Performed by: UROLOGY

## 2022-08-28 PROCEDURE — 74420 UROGRAPHY RTRGR +-KUB: CPT

## 2022-08-28 DEVICE — URETERAL STENT
Type: IMPLANTABLE DEVICE | Site: URETER | Status: FUNCTIONAL
Brand: POLARIS™ ULTRA

## 2022-08-28 RX ORDER — PROPOFOL 10 MG/ML
VIAL (ML) INTRAVENOUS AS NEEDED
Status: DISCONTINUED | OUTPATIENT
Start: 2022-08-28 | End: 2022-08-28 | Stop reason: SURG

## 2022-08-28 RX ORDER — NALOXONE HCL 0.4 MG/ML
0.4 VIAL (ML) INJECTION AS NEEDED
Status: DISCONTINUED | OUTPATIENT
Start: 2022-08-28 | End: 2022-08-28 | Stop reason: HOSPADM

## 2022-08-28 RX ORDER — PROMETHAZINE HYDROCHLORIDE 25 MG/1
25 TABLET ORAL ONCE AS NEEDED
Status: DISCONTINUED | OUTPATIENT
Start: 2022-08-28 | End: 2022-08-28 | Stop reason: HOSPADM

## 2022-08-28 RX ORDER — MIDAZOLAM HYDROCHLORIDE 1 MG/ML
INJECTION INTRAMUSCULAR; INTRAVENOUS AS NEEDED
Status: DISCONTINUED | OUTPATIENT
Start: 2022-08-28 | End: 2022-08-28 | Stop reason: SURG

## 2022-08-28 RX ORDER — OXYCODONE HYDROCHLORIDE AND ACETAMINOPHEN 5; 325 MG/1; MG/1
1 TABLET ORAL EVERY 6 HOURS PRN
Status: DISCONTINUED | OUTPATIENT
Start: 2022-08-28 | End: 2022-08-31 | Stop reason: HOSPADM

## 2022-08-28 RX ORDER — KETOROLAC TROMETHAMINE 10 MG/1
10 TABLET, FILM COATED ORAL EVERY 6 HOURS
Status: DISCONTINUED | OUTPATIENT
Start: 2022-08-28 | End: 2022-08-28

## 2022-08-28 RX ORDER — ONDANSETRON 2 MG/ML
4 INJECTION INTRAMUSCULAR; INTRAVENOUS ONCE AS NEEDED
Status: DISCONTINUED | OUTPATIENT
Start: 2022-08-28 | End: 2022-08-28 | Stop reason: HOSPADM

## 2022-08-28 RX ORDER — PROMETHAZINE HYDROCHLORIDE 25 MG/1
25 SUPPOSITORY RECTAL ONCE AS NEEDED
Status: DISCONTINUED | OUTPATIENT
Start: 2022-08-28 | End: 2022-08-28 | Stop reason: HOSPADM

## 2022-08-28 RX ORDER — DIPHENHYDRAMINE HYDROCHLORIDE 50 MG/ML
12.5 INJECTION INTRAMUSCULAR; INTRAVENOUS
Status: DISCONTINUED | OUTPATIENT
Start: 2022-08-28 | End: 2022-08-28 | Stop reason: HOSPADM

## 2022-08-28 RX ORDER — KETOROLAC TROMETHAMINE 10 MG/1
10 TABLET, FILM COATED ORAL EVERY 6 HOURS PRN
Status: DISCONTINUED | OUTPATIENT
Start: 2022-08-28 | End: 2022-08-31 | Stop reason: HOSPADM

## 2022-08-28 RX ORDER — OXYCODONE HYDROCHLORIDE AND ACETAMINOPHEN 5; 325 MG/1; MG/1
1 TABLET ORAL EVERY 6 HOURS PRN
Status: DISCONTINUED | OUTPATIENT
Start: 2022-08-28 | End: 2022-08-28

## 2022-08-28 RX ORDER — FENTANYL CITRATE 50 UG/ML
INJECTION, SOLUTION INTRAMUSCULAR; INTRAVENOUS AS NEEDED
Status: DISCONTINUED | OUTPATIENT
Start: 2022-08-28 | End: 2022-08-28 | Stop reason: SURG

## 2022-08-28 RX ORDER — EPHEDRINE SULFATE 50 MG/ML
5 INJECTION, SOLUTION INTRAVENOUS ONCE AS NEEDED
Status: DISCONTINUED | OUTPATIENT
Start: 2022-08-28 | End: 2022-08-28 | Stop reason: HOSPADM

## 2022-08-28 RX ORDER — FLUMAZENIL 0.1 MG/ML
0.2 INJECTION INTRAVENOUS AS NEEDED
Status: DISCONTINUED | OUTPATIENT
Start: 2022-08-28 | End: 2022-08-28 | Stop reason: HOSPADM

## 2022-08-28 RX ORDER — MEPERIDINE HYDROCHLORIDE 25 MG/ML
12.5 INJECTION INTRAMUSCULAR; INTRAVENOUS; SUBCUTANEOUS
Status: DISCONTINUED | OUTPATIENT
Start: 2022-08-28 | End: 2022-08-28 | Stop reason: HOSPADM

## 2022-08-28 RX ORDER — ACETAMINOPHEN 325 MG/1
650 TABLET ORAL ONCE AS NEEDED
Status: DISCONTINUED | OUTPATIENT
Start: 2022-08-28 | End: 2022-08-28 | Stop reason: HOSPADM

## 2022-08-28 RX ORDER — ACETAMINOPHEN 650 MG/1
650 SUPPOSITORY RECTAL ONCE AS NEEDED
Status: DISCONTINUED | OUTPATIENT
Start: 2022-08-28 | End: 2022-08-28 | Stop reason: HOSPADM

## 2022-08-28 RX ADMIN — ONDANSETRON 4 MG: 2 INJECTION INTRAMUSCULAR; INTRAVENOUS at 13:11

## 2022-08-28 RX ADMIN — PIPERACILLIN AND TAZOBACTAM 4.5 G: 4; .5 INJECTION, POWDER, LYOPHILIZED, FOR SOLUTION INTRAVENOUS; PARENTERAL at 12:21

## 2022-08-28 RX ADMIN — HYDROCODONE BITARTRATE AND ACETAMINOPHEN 1 TABLET: 5; 325 TABLET ORAL at 13:10

## 2022-08-28 RX ADMIN — DOCUSATE SODIUM 100 MG: 100 CAPSULE, LIQUID FILLED ORAL at 19:24

## 2022-08-28 RX ADMIN — FAMOTIDINE 20 MG: 20 TABLET ORAL at 15:14

## 2022-08-28 RX ADMIN — FENTANYL CITRATE 25 MCG: 50 INJECTION INTRAMUSCULAR; INTRAVENOUS at 07:47

## 2022-08-28 RX ADMIN — PROPOFOL 200 MG: 10 INJECTION, EMULSION INTRAVENOUS at 07:31

## 2022-08-28 RX ADMIN — OXYCODONE HYDROCHLORIDE AND ACETAMINOPHEN 1 TABLET: 5; 325 TABLET ORAL at 19:25

## 2022-08-28 RX ADMIN — HYDROMORPHONE HYDROCHLORIDE 0.5 MG: 1 INJECTION, SOLUTION INTRAMUSCULAR; INTRAVENOUS; SUBCUTANEOUS at 08:21

## 2022-08-28 RX ADMIN — KETOROLAC TROMETHAMINE 10 MG: 10 TABLET, FILM COATED ORAL at 21:41

## 2022-08-28 RX ADMIN — DOCUSATE SODIUM 100 MG: 100 CAPSULE, LIQUID FILLED ORAL at 11:22

## 2022-08-28 RX ADMIN — PIPERACILLIN AND TAZOBACTAM 4.5 G: 4; .5 INJECTION, POWDER, LYOPHILIZED, FOR SOLUTION INTRAVENOUS; PARENTERAL at 03:51

## 2022-08-28 RX ADMIN — HYDROMORPHONE HYDROCHLORIDE 0.5 MG: 1 INJECTION, SOLUTION INTRAMUSCULAR; INTRAVENOUS; SUBCUTANEOUS at 08:44

## 2022-08-28 RX ADMIN — FENTANYL CITRATE 50 MCG: 50 INJECTION INTRAMUSCULAR; INTRAVENOUS at 07:31

## 2022-08-28 RX ADMIN — FENTANYL CITRATE 25 MCG: 50 INJECTION INTRAMUSCULAR; INTRAVENOUS at 07:39

## 2022-08-28 RX ADMIN — FERROUS SULFATE TAB EC 324 MG (65 MG FE EQUIVALENT) 324 MG: 324 (65 FE) TABLET DELAYED RESPONSE at 11:22

## 2022-08-28 RX ADMIN — MIDAZOLAM HYDROCHLORIDE 2 MG: 1 INJECTION, SOLUTION INTRAMUSCULAR; INTRAVENOUS at 07:28

## 2022-08-28 RX ADMIN — ACETAMINOPHEN 650 MG: 325 TABLET, FILM COATED ORAL at 15:14

## 2022-08-28 NOTE — NURSING NOTE
IV came out, pt refused to allow more than one stick and stated 'If you cant get it on the first try you're not trying again.' attempted x 1, pt screamed the entire time and yelled at myself and her SO. IV placement unsuccessful. Did not attempt a second attempt. Amy SAAB aware.

## 2022-08-28 NOTE — ANESTHESIA POSTPROCEDURE EVALUATION
Patient: Mackenzie RIVAS    Procedure Summary     Date: 08/28/22 Room / Location: [facility] OR 02 / [facility] OR    Anesthesia Start: 0722 Anesthesia Stop: 0801    Procedure: CYSTOSCOPY LEFT RETROGRADE PYELOGRAM AND STENT INSERTION (Left ) Diagnosis:       Hydronephrosis, unspecified hydronephrosis type      (Hydronephrosis, unspecified hydronephrosis type [N13.30])    Surgeons: Jarad, Popeye MENDOZA MD Provider: Yesenia Griffith CRNA    Anesthesia Type: general ASA Status: 3 - Emergent          Anesthesia Type: general    Vitals  Vitals Value Taken Time   /73 08/28/22 0805   Temp 97.1 °F (36.2 °C) 08/28/22 0805   Pulse 111 08/28/22 0805   Resp 20 08/28/22 0805   SpO2 100 % 08/28/22 0805           Post Anesthesia Care and Evaluation    Patient location during evaluation: PACU  Patient participation: complete - patient participated  Level of consciousness: awake and awake and alert  Pain score: 2  Pain management: satisfactory to patient    Airway patency: patent  Anesthetic complications: No anesthetic complications  PONV Status: none  Cardiovascular status: acceptable and stable  Respiratory status: acceptable, room air and spontaneous ventilation  Hydration status: acceptable

## 2022-08-28 NOTE — NURSING NOTE
Have instructed patient twice this AM to clean up with CHG wipes and to change into a hospital gown. Patient voices understanding, but yet refuses to change.

## 2022-08-28 NOTE — ANESTHESIA PROCEDURE NOTES
Airway  Urgency: elective    Date/Time: 8/28/2022 7:31 AM  Airway not difficult    General Information and Staff    Patient location during procedure: OR  CRNA/CAA: Yesenia Griffith CRNA    Indications and Patient Condition  Indications for airway management: airway protection    Preoxygenated: yes  MILS not maintained throughout  Mask difficulty assessment: 1 - vent by mask    Final Airway Details  Final airway type: supraglottic airway      Successful airway: I-gel  Size 4    Number of attempts at approach: 1  Assessment: lips, teeth, and gum same as pre-op and atraumatic intubation

## 2022-08-28 NOTE — PROGRESS NOTES
[facility] Medicine Services  INPATIENT PROGRESS NOTE    Length of Stay: 1  Date of Admission: 8/26/2022  Primary Care Physician: Provider, No Known    Subjective   Chief Complaint: Left flank pain.     HPI: This is a 37-year-old female with past medical history of bipolar disorder, thyroid disease and tobacco abuse that presented to [facility] on 8/26/2022 with complaints of left flank pain.  CT of the abdomen and pelvis indicated severe left-sided hydronephrosis including severe pelviectasis with no significant ureterectasis.      The patient was admitted for urosepsis with acute left pyelonephritis.  Urology was consulted and the patient was started on IV Zosyn, antiemetics and pain control.  Cystoscopy was done by Dr. Abraham on 8/28/2022.    Review of Systems   Constitutional: Negative for activity change and fatigue.   HENT: Negative for ear pain and sore throat.    Eyes: Negative for pain and discharge.   Respiratory: Negative for cough and shortness of breath.    Cardiovascular: Negative for chest pain and palpitations.   Gastrointestinal: Negative for abdominal pain and nausea.   Endocrine: Negative for cold intolerance and heat intolerance.   Genitourinary: Positive for flank pain. Negative for difficulty urinating and dysuria.   Musculoskeletal: Negative for arthralgias and gait problem.   Skin: Negative for color change and rash.   Neurological: Negative for dizziness and weakness.   Psychiatric/Behavioral: Negative for agitation and confusion.        Objective    Temp:  [97.1 °F (36.2 °C)-98.8 °F (37.1 °C)] 98.8 °F (37.1 °C)  Heart Rate:  [] 105  Resp:  [18-20] 20  BP: (113-130)/(62-81) 122/78    Physical Exam  Constitutional:       Appearance: She is well-developed.   HENT:      Head: Normocephalic and atraumatic.   Eyes:      Pupils: Pupils are equal, round, and reactive to light.   Cardiovascular:      Rate and Rhythm: Normal rate and regular rhythm.   Pulmonary:       Effort: Pulmonary effort is normal.      Breath sounds: Normal breath sounds.   Abdominal:      General: Bowel sounds are normal.      Palpations: Abdomen is soft.   Musculoskeletal:         General: Normal range of motion.      Cervical back: Normal range of motion and neck supple.   Skin:     General: Skin is warm and dry.   Neurological:      Mental Status: She is alert and oriented to person, place, and time.   Psychiatric:         Behavior: Behavior normal.       Results Review:  I have reviewed the labs, radiology results, and diagnostic studies.    Laboratory Data:   Results from last 7 days   Lab Units 08/27/22  0811 08/26/22  0336   SODIUM mmol/L 137 139   POTASSIUM mmol/L 3.4* 3.9   CHLORIDE mmol/L 105 108*   CO2 mmol/L 26.0 24.0   BUN mg/dL 14 16   CREATININE mg/dL 0.98 0.99   GLUCOSE mg/dL 121* 106*   CALCIUM mg/dL 8.6 8.5*   BILIRUBIN mg/dL  --  0.6   ALK PHOS U/L  --  72   ALT (SGPT) U/L  --  9   AST (SGOT) U/L  --  9   ANION GAP mmol/L 6.0 7.0     Estimated Creatinine Clearance: 102.7 mL/min (by C-G formula based on SCr of 0.98 mg/dL).  Results from last 7 days   Lab Units 08/27/22  0811   MAGNESIUM mg/dL 1.8         Results from last 7 days   Lab Units 08/27/22  0811 08/26/22  0336   WBC 10*3/mm3 15.53* 17.83*   HEMOGLOBIN g/dL 9.6* 10.6*   HEMATOCRIT % 29.0* 31.3*   PLATELETS 10*3/mm3 171 192           Culture Data:   Blood Culture   Date Value Ref Range Status   08/26/2022 No growth at 2 days  Preliminary   08/26/2022 No growth at 2 days  Preliminary     Urine Culture   Date Value Ref Range Status   08/26/2022 25,000 CFU/mL Mixed Leila Isolated  Final     No results found for: RESPCX  No results found for: WOUNDCX  No results found for: STOOLCX  No components found for: BODYFLD    Radiology Data:   Imaging Results (Last 24 Hours)     Procedure Component Value Units Date/Time    FL Retrograde Pyelogram In OR [[id number]] Resulted: 08/28/22 0741     Updated: 08/28/22 0757          I have reviewed the  patient's current medications.     Assessment/Plan     Active Hospital Problems    Diagnosis    • Pelvicaliectasis        Plan:    1.  Urosepsis: Continue IV fluids.  Urine and blood cultures pending.  Continue broad-spectrum antibiotics.    2.  Severe left-sided hydronephrosis/pelviectasis: Urology consult appreciated. Cystoscopy today.  3.  Anemia, iron deficiency: Oral iron started.      DVT PPx:  SCDs.      Discharge Planning: I expect patient to be discharged to home in 1-2 days.    I confirmed that the patient's Advance Care Plan is present, code status is documented, or surrogate decision maker is listed in the patient's medical record.      I have utilized all available immediate resources to obtain, update, or review the patient's current medications.         This document has been electronically signed by KAISER Duarte on August 28, 2022 09:33 CDT

## 2022-08-28 NOTE — PROGRESS NOTES
LOS: 1 day     Patient Care Team:  Provider, No Known as PCP - General  Fredy Johns MD (Family Medicine)  Wellington Selby MD as Resident (Family Medicine)  Robbie Garcia MD as Resident (Family Medicine)  Steve Shay MD as Resident (Family Medicine)  Emily Reid MD as Resident (Family Medicine)      Subjective     Left hydroureteronephrosis    Objective       Vital Signs  Temp:  [97.3 °F (36.3 °C)-98.7 °F (37.1 °C)] 98.7 °F (37.1 °C)  Heart Rate:  [] 109  Resp:  [18] 18  BP: (113-123)/(62-79) 116/79    Physical Exam:        General Appearance:   Hurting     Respiratory:    UNLABORED RESPIRATIONS.     Abdomen:     SOFT.       Genitourinary:  Urine clear     Rectal:     DEFERRED       Results Review:       Imaging Results (Last 24 Hours)     ** No results found for the last 24 hours. **        Lab Results (last 24 hours)     Procedure Component Value Units Date/Time    Blood Culture - Blood, Arm, Right [[id number]]  (Normal) Collected: 08/26/22 0658    Specimen: Blood from Arm, Right Updated: 08/28/22 0715     Blood Culture No growth at 2 days    Blood Culture - Blood, Arm, Left [[id number]]  (Normal) Collected: 08/26/22 0435    Specimen: Blood from Arm, Left Updated: 08/28/22 0446     Blood Culture No growth at 2 days    Urine Culture - Urine, Urine, Clean Catch [[id number]] Collected: 08/26/22 0352    Specimen: Urine, Clean Catch Updated: 08/27/22 1136     Urine Culture 25,000 CFU/mL Mixed Leila Isolated    Narrative:      Specimen contains mixed organisms of questionable pathogenicity suggestive of contamination. If symptoms persist, suggest recollection.  Colonization of the urinary tract without infection is common. Treatment is discouraged unless the patient is symptomatic, pregnant, or undergoing an invasive urologic procedure.    Hemoglobin A1c [[id number]]  (Normal) Collected: 08/27/22 0811    Specimen: Blood Updated: 08/27/22 0843     Hemoglobin A1C 5.10 %     Narrative:       Hemoglobin A1C Ranges:    Increased Risk for Diabetes  5.7% to 6.4%  Diabetes                     >= 6.5%  Diabetic Goal                < 7.0%    TSH [[id number]]  (Abnormal) Collected: 08/27/22 0811    Specimen: Blood Updated: 08/27/22 0842     TSH 4.600 uIU/mL     Basic Metabolic Panel [[id number]]  (Abnormal) Collected: 08/27/22 0811    Specimen: Blood Updated: 08/27/22 0836     Glucose 121 mg/dL      BUN 14 mg/dL      Creatinine 0.98 mg/dL      Sodium 137 mmol/L      Potassium 3.4 mmol/L      Chloride 105 mmol/L      CO2 26.0 mmol/L      Calcium 8.6 mg/dL      BUN/Creatinine Ratio 14.3     Anion Gap 6.0 mmol/L      eGFR 76.9 mL/min/1.73      Comment: National Kidney Foundation and American Society of Nephrology (ASN) Task Force recommended calculation based on the Chronic Kidney Disease Epidemiology Collaboration (CKD-EPI) equation refit without adjustment for race.       Narrative:      GFR Normal >60  Chronic Kidney Disease <60  Kidney Failure <15      Magnesium [[id number]]  (Normal) Collected: 08/27/22 0811    Specimen: Blood Updated: 08/27/22 0836     Magnesium 1.8 mg/dL     CBC (No Diff) [[id number]]  (Abnormal) Collected: 08/27/22 0811    Specimen: Blood Updated: 08/27/22 0817     WBC 15.53 10*3/mm3      RBC 3.38 10*6/mm3      Hemoglobin 9.6 g/dL      Hematocrit 29.0 %      MCV 85.8 fL      MCH 28.4 pg      MCHC 33.1 g/dL      RDW 14.6 %      RDW-SD 46.3 fl      MPV 10.4 fL      Platelets 171 10*3/mm3             I reviewed the patient's new clinical results.  I reviewed the patient's new imaging results and agree with the interpretation.  I reviewed the patient's other test results and agree with the interpretation        Assessment left hydroureteronephrosis    Plan:     Cystoscopy retrograde ureteroscopy laser lithotripsy J stent placement risk and benefits of been discussed      Popeye Abraham MD  08/28/22  07:18 CDT

## 2022-08-28 NOTE — PLAN OF CARE
Goal Outcome Evaluation:  Plan of Care Reviewed With: patient        Progress: no change  Outcome Evaluation: VSS. Rested well this shift. Pain well managed.

## 2022-08-28 NOTE — ANESTHESIA PREPROCEDURE EVALUATION
Anesthesia Evaluation     Patient summary reviewed and Nursing notes reviewed   NPO Solid Status: > 8 hours  NPO Liquid Status: > 8 hours           Airway   Mallampati: II  TM distance: >3 FB  Possible difficult intubation  Dental    (+) poor dentition    Pulmonary - normal exam   (+) a smoker Current, recent URI resolved,   Cardiovascular - normal exam    (+) hypertension,       Neuro/Psych  (+) psychiatric history Bipolar and Anxiety,    GI/Hepatic/Renal/Endo    (+) obesity,   hepatitis A, liver disease, renal disease stones, thyroid problem     Musculoskeletal (-) negative ROS    Abdominal   (+) obese,    Substance History   (+) drug use     OB/GYN    (-)  Pregnant        Other - negative ROS                       Anesthesia Plan    ASA 3 - emergent     general     intravenous induction     Anesthetic plan, risks, benefits, and alternatives have been provided, discussed and informed consent has been obtained with: patient.        CODE STATUS:    Code Status (Patient has no pulse and is not breathing): CPR (Attempt to Resuscitate)  Medical Interventions (Patient has pulse or is breathing): Full Support

## 2022-08-28 NOTE — PAYOR COMM NOTE
"Rosa alexander rn [facility] phone [phone]   fax [phone]  Inpatient admission oip/ was obv 8/26 and 8/27          Mackenzie RIVAS (37 y.o. Female)             Date of Birth   1985    Social Security Number       Address   [address] [place] [address] [place] [address]    Home Phone   [phone]    MRN   [id number]       Buddhist   None    Marital Status   Single                            Admission Date   8/26/22    Admission Type   Emergency    Admitting Provider   Behroozi, Saeid, MD    Attending Provider   Shawn Montemayor MD    Department, Room/Bed   [address], 429/1       Discharge Date       Discharge Disposition       Discharge Destination                               Attending Provider: Shawn Montemayor MD    Allergies: No Known Allergies    Isolation: None   Infection: None   Code Status: CPR   Advance Care Planning Activity    Ht: 167.6 cm (66\")   Wt: 116 kg (255 lb 6.4 oz)    Admission Cmt: None   Principal Problem: pelvicaliectasis                Active Insurance as of 8/26/2022     Primary Coverage     Payor Plan Insurance Group Employer/Plan Group    [employer] [place] [employer]ICA[place]      Payor Plan Address Payor Plan Phone Number Payor Plan Fax Number Effective Dates    [address] [address] [phone]  12/24/2020 - None Entered    [facility] [id number]       Subscriber Name Subscriber Birth Date Member ID       MACKENZIE RIVAS 1985 [id number]                 Emergency Contacts      (Rel.) Home Phone Work Phone Mobile Phone    KATY GLASER (Significant Other) [phone] -- --               History & Physical      Behroozi, Saeid, MD at 08/26/22 0619                [facility] Admission      Date of Admission: 8/26/2022      Primary Care Physician: Provider, No Known      Chief Complaint: left flank pain    HPI:  Patient is a morbidly obese 37-year-old female with known past " medical history of bipolar disorder, who presented to ER with complaint of left flank pain.  Patient was given  Zosyn in the ED.  she had significantly abnormal CT of the abdomen and pelvis.  hospitalist service was called for admission of the patient concerning urinary tract infection.    Patient was seen and examined in ED room 11.  Her boyfriend at bedside.  Patient reports yesterday around 2 PM she started having dull intermittent moderate to severe left flank pain with radiation to the back.  Her pain was associated with nausea but no vomiting.  She had chills and fatigue.  She denies having similar episodes in the past.  She denies any particular hematuria or radiation of the pain to groin area.  Pain responds to analgesics.  She denies any fall injury trauma fever headache sore throat chest pain shortness of breath, vomiting, diarrhea , frequency polyuria, constipation.  She denies having similar episodes in the past.    Concurrent Medical History:  has a past medical history of Acneiform eruption, Amenorrhea, Anxiety, Bipolar disorder (CMS/HCC), Disease of thyroid gland, History of incompetent cervix, currently pregnant, Infectious viral hepatitis, RhD negative, Smoker, Tobacco dependence syndrome, URI (upper respiratory infection), Urogenital trichomoniasis, and Varicella.    Past Surgical History:  has a past surgical history that includes Dilation and curettage of uterus; Pap Smear; and Cervix surgery (2005).    Family History: family history includes Asthma in her son; Bipolar disorder in an other family member; Cancer in her maternal grandmother; Diabetes in her maternal grandfather and another family member; Hypertension in her mother; Lung cancer in her paternal grandfather; No Known Problems in her daughter, daughter, son, and son.     Social History:  reports that she has been smoking cigarettes. She has a 8.50 pack-year smoking history. She has never used smokeless tobacco. She reports previous  drug use. Drug: Marijuana. She reports that she does not drink alcohol.    Allergies: No Known Allergies    Medications:   Prior to Admission medications    Medication Sig Start Date End Date Taking? Authorizing Provider   brompheniramine-pseudoephedrine-DM 30-2-10 MG/5ML syrup Take 5 mL by mouth 4 (Four) Times a Day As Needed for Congestion, Cough or Allergies. 8/20/21   Yaron Marshall MD       Review of Systems:  Review of Systems   Constitutional: Positive for chills and fatigue. Negative for diaphoresis and fever.   HENT: Negative for congestion, dental problem, ear pain, facial swelling, rhinorrhea and sinus pressure.    Eyes: Negative for photophobia, discharge, redness, itching and visual disturbance.   Respiratory: Negative for apnea, cough, choking, chest tightness, shortness of breath, wheezing and stridor.    Cardiovascular: Negative for chest pain, palpitations and leg swelling.   Gastrointestinal: Positive for abdominal pain. Negative for abdominal distention, blood in stool, diarrhea, nausea, rectal pain and vomiting. Anal bleeding: left flank pain.   Endocrine: Negative for cold intolerance, heat intolerance, polydipsia, polyphagia and polyuria.   Genitourinary: Negative for difficulty urinating, flank pain, frequency, hematuria and urgency.   Musculoskeletal: Negative for arthralgias, back pain, joint swelling and myalgias.   Skin: Negative for pallor, rash and wound.   Allergic/Immunologic: Negative for environmental allergies and immunocompromised state.   Neurological: Negative for dizziness, tremors, seizures, facial asymmetry, speech difficulty, weakness, light-headedness, numbness and headaches.   Hematological: Negative for adenopathy. Does not bruise/bleed easily.   Psychiatric/Behavioral: Negative for agitation, behavioral problems and hallucinations. The patient is not nervous/anxious.       Otherwise complete ROS is negative except as mentioned above.    Physical Exam:   Temp:  [98.2  °F (36.8 °C)] 98.2 °F (36.8 °C)  Heart Rate:  [80-94] 94  Resp:  [16-18] 16  BP: ()/(58-73) 95/58  Physical Exam  Constitutional:       General: She is not in acute distress.     Appearance: She is obese. She is not ill-appearing, toxic-appearing or diaphoretic.   HENT:      Head: Normocephalic and atraumatic.      Right Ear: External ear normal.      Left Ear: External ear normal.      Nose: Nose normal.      Mouth/Throat:      Mouth: Mucous membranes are moist.      Pharynx: Oropharynx is clear.   Eyes:      Extraocular Movements: Extraocular movements intact.      Conjunctiva/sclera: Conjunctivae normal.      Pupils: Pupils are equal, round, and reactive to light.   Cardiovascular:      Rate and Rhythm: Normal rate and regular rhythm.      Heart sounds: No murmur heard.    No friction rub. No gallop.   Pulmonary:      Effort: No respiratory distress.      Breath sounds: No stridor. No wheezing or rales.   Chest:      Chest wall: No tenderness.   Abdominal:      General: Abdomen is flat. There is no distension.      Palpations: Abdomen is soft.      Tenderness: There is no abdominal tenderness. There is no guarding or rebound.      Comments: Left flank tenderness   Musculoskeletal:         General: No swelling or tenderness.      Cervical back: No rigidity or tenderness.      Right lower leg: No edema.      Left lower leg: No edema.   Lymphadenopathy:      Cervical: No cervical adenopathy.   Skin:     General: Skin is warm and dry.      Coloration: Skin is not jaundiced.      Findings: No erythema.   Neurological:      Mental Status: She is alert and oriented to person, place, and time. Mental status is at baseline.      Sensory: No sensory deficit.      Motor: No weakness.      Coordination: Coordination normal.   Psychiatric:         Mood and Affect: Mood normal.         Behavior: Behavior normal.         Judgment: Judgment normal.           Results Reviewed:  I have personally reviewed current lab,  radiology, and data and agree with results.  Lab Results (last 24 hours)     Procedure Component Value Units Date/Time    Lactic Acid, Plasma [[id number]]  (Normal) Collected: 08/26/22 0435    Specimen: Blood Updated: 08/26/22 0500     Lactate 0.8 mmol/L     Extra Tubes [[id number]] Collected: 08/26/22 0336    Specimen: Blood, Venous Line Updated: 08/26/22 0447    Narrative:      The following orders were created for panel order Extra Tubes.  Procedure                               Abnormality         Status                     ---------                               -----------         ------                     Light Blue Top[[id number]]                                   Final result                 Please view results for these tests on the individual orders.    Light Blue Top [[id number]] Collected: 08/26/22 0336    Specimen: Blood Updated: 08/26/22 0447     Extra Tube Hold for add-ons.     Comment: Auto resulted       Blood Culture - Blood, Arm, Left [[id number]] Collected: 08/26/22 0435    Specimen: Blood from Arm, Left Updated: 08/26/22 0445    Comprehensive Metabolic Panel [[id number]]  (Abnormal) Collected: 08/26/22 0336    Specimen: Blood Updated: 08/26/22 0405     Glucose 106 mg/dL      BUN 16 mg/dL      Creatinine 0.99 mg/dL      Sodium 139 mmol/L      Potassium 3.9 mmol/L      Chloride 108 mmol/L      CO2 24.0 mmol/L      Calcium 8.5 mg/dL      Total Protein 6.1 g/dL      Albumin 3.30 g/dL      ALT (SGPT) 9 U/L      AST (SGOT) 9 U/L      Alkaline Phosphatase 72 U/L      Total Bilirubin 0.6 mg/dL      Globulin 2.8 gm/dL      A/G Ratio 1.2 g/dL      BUN/Creatinine Ratio 16.2     Anion Gap 7.0 mmol/L      eGFR 75.9 mL/min/1.73      Comment: National Kidney Foundation and American Society of Nephrology (ASN) Task Force recommended calculation based on the Chronic Kidney Disease Epidemiology Collaboration (CKD-EPI) equation refit without adjustment for race.       Narrative:      GFR Normal >60  Chronic Kidney  Disease <60  Kidney Failure <15      Lipase [[id number]]  (Abnormal) Collected: 08/26/22 0336    Specimen: Blood Updated: 08/26/22 0405     Lipase 11 U/L     Urinalysis, Microscopic Only - Urine, Clean Catch [[id number]]  (Abnormal) Collected: 08/26/22 0352    Specimen: Urine, Clean Catch Updated: 08/26/22 0403     RBC, UA 3-5 /HPF      WBC, UA Too Numerous to Count /HPF      Bacteria, UA None Seen /HPF      Squamous Epithelial Cells, UA 3-5 /HPF      Hyaline Casts, UA None Seen /LPF      Methodology Automated Microscopy    Urine Culture - Urine, Urine, Clean Catch [[id number]] Collected: 08/26/22 0352    Specimen: Urine, Clean Catch Updated: 08/26/22 0403    Urinalysis With Culture If Indicated - Urine, Clean Catch [[id number]]  (Abnormal) Collected: 08/26/22 0352    Specimen: Urine, Clean Catch Updated: 08/26/22 0401     Color, UA Yellow     Appearance, UA Cloudy     pH, UA 5.5     Specific Gravity, UA 1.030     Glucose, UA Negative     Ketones, UA Trace     Bilirubin, UA Negative     Blood, UA Moderate (2+)     Protein, UA 30 mg/dL (1+)     Leuk Esterase, UA Large (3+)     Nitrite, UA Negative     Urobilinogen, UA 1.0 E.U./dL    Narrative:      In absence of clinical symptoms, the presence of pyuria, bacteria, and/or nitrites on the urinalysis result does not correlate with infection.    hCG, Serum, Qualitative [[id number]]  (Normal) Collected: 08/26/22 0336    Specimen: Blood Updated: 08/26/22 0400     HCG Qualitative Negative    CBC & Differential [[id number]]  (Abnormal) Collected: 08/26/22 0336    Specimen: Blood Updated: 08/26/22 0343    Narrative:      The following orders were created for panel order CBC & Differential.  Procedure                               Abnormality         Status                     ---------                               -----------         ------                     CBC Auto Differential[[id number]]        Abnormal            Final result                 Please view results for these  tests on the individual orders.    CBC Auto Differential [[id number]]  (Abnormal) Collected: 08/26/22 0336    Specimen: Blood Updated: 08/26/22 0343     WBC 17.83 10*3/mm3      RBC 3.65 10*6/mm3      Hemoglobin 10.6 g/dL      Hematocrit 31.3 %      MCV 85.8 fL      MCH 29.0 pg      MCHC 33.9 g/dL      RDW 14.4 %      RDW-SD 44.6 fl      MPV 10.6 fL      Platelets 192 10*3/mm3      Neutrophil % 89.8 %      Lymphocyte % 3.9 %      Monocyte % 5.1 %      Eosinophil % 0.2 %      Basophil % 0.3 %      Immature Grans % 0.7 %      Neutrophils, Absolute 16.02 10*3/mm3      Lymphocytes, Absolute 0.70 10*3/mm3      Monocytes, Absolute 0.91 10*3/mm3      Eosinophils, Absolute 0.03 10*3/mm3      Basophils, Absolute 0.05 10*3/mm3      Immature Grans, Absolute 0.12 10*3/mm3      nRBC 0.0 /100 WBC         Imaging Results (Last 24 Hours)     Procedure Component Value Units Date/Time    CT Abdomen Pelvis Without Contrast [[id number]] Collected: 08/26/22 0412     Updated: 08/26/22 0551    Narrative:      EXAM:    CT Abdomen and Pelvis Without Intravenous Contrast    FACILITY:    [facility]    REFERRING:    ANTHONY GIMENEZ    CLINICAL HISTORY:    37 years, Female; LLQ pain, left flank pain    TECHNIQUE:    Axial computed tomography images of the abdomen and pelvis  without intravenous contrast.  This CT exam was performed using  one or more of the following dose reduction techniques:   automated exposure control, adjustment of the mA and/or kV  according to patient size, and/or use of iterative reconstruction  technique.    COMPARISON:    5/4/2020    FINDINGS:    Lung bases:  Unremarkable.  No mass.  No consolidation.     ABDOMEN:    Liver:  Unremarkable.    Gallbladder and bile ducts:  Unremarkable.  No calcified  stones.  No ductal dilation.    Pancreas:  Unremarkable.  No ductal dilation.    Spleen:  Unremarkable.  No splenomegaly.    Adrenals:  Unremarkable.  No mass.    Kidneys and ureters:  There is evidence of  severe left-sided  hydronephrosis including pelvicaliectasis with no significant  ureterectasis. There is also a prominent degree of left  perinephric stranding/edema with small amount of fluid tracking  along the left paracolic gutter. There is no visualized  obstructing calculus. Urology consultation would be recommended.    Stomach and bowel:  There is mild to moderate increased stool  within the colon.        No obstruction.  No mucosal thickening.     PELVIS:    Appendix:  No findings to suggest acute appendicitis.    Bladder:  The urinary bladder is partially decompressed but  unremarkable in appearance.        No stones.    Reproductive:  Unremarkable as visualized.     ABDOMEN and PELVIS:    Intraperitoneal space:  There is a small amount of free fluid  within the cul-de-sac.        No free air.    Bones/joints:  No acute fracture.  No dislocation.    Soft tissues:  Unremarkable.    Vasculature:  Unremarkable.  No abdominal aortic aneurysm.    Lymph nodes:  Unremarkable.  No enlarged lymph nodes.      Impression:      *  Severe left-sided hydronephrosis including severe  pelvicaliectasis with no significant ureterectasis. There is also  a prominent degree of left perinephric stranding/edema with fluid  tracking along the left paracolic gutter. There is no visualized  obstructing calculus.   *  It is uncertain whether this is associated with recently  passed urinary tract calculus or a nonvisualized calculus.   *  Urology consultation would be recommended.  *  Small amount of free fluid within the cul-de-sac.  *  Mild to moderate increased stool within the colon.    Electronically signed by:  Jovanny Dye DO  8/26/2022 5:49 AM CDT  Workstation: [id number]            Assessment:    Active Hospital Problems    Diagnosis    • Pelvicaliectasis      # Sepsis, early sepsis present on admission cannot be excluded sepsis secondary to urinary tract infection, considering borderline hypotension, leukocytosis, with   source of infection  # Acute left pyelonephritis   # Urinary tract infection   # Pelvicaliectasis  # severe left hydronephrosis  # Leukocytosis, reactive   # Anemia with no sign of active bleeding  # Borderline hypotension  # Morbid obesity with BMI of 41.           Plan:  Place on telemetry  Blood culture and urine cultures were obtained in ED.  Obtain further laboratory work-up  Placed on broad-spectrum IV antibiotic Zosyn, analgesics supportive therapy  Use IV fluid judiciously at this time considering severe left hydronephrosis.  Will obtain urology service consultation  Comorbidities will be treated appropriately  Please see orders for comprehensive plan    I confirmed that the patient's Advance Care Plan is present, code status is documented, or surrogate decision maker is listed in the patient's medical record.     I have utilized all available immediate resources to obtain, update, or review the patient's current medications.     I discussed the patient's findings and my recommendations with: Patient and she agreed with above plan of care.      Saeid Behroozi, MD   08/26/22   06:19 CDT                Electronically signed by Behroozi, Saeid, MD at 08/26/22 0753          Emergency Department Notes      Ross Santos MD at 08/26/22 0314          Subjective     Abdominal Pain  Pain quality: aching    Pain radiates to:  Does not radiate  Pain severity:  Moderate  Duration:  2 days  Timing:  Constant  Progression:  Worsening  Chronicity:  New  Relieved by:  Nothing  Worsened by:  Nothing  Associated symptoms: chills, fatigue and nausea    Associated symptoms: no chest pain, no cough, no diarrhea, no dysuria, no fever, no shortness of breath, no sore throat and no vomiting        Review of Systems   Constitutional: Positive for chills and fatigue. Negative for appetite change, diaphoresis and fever.   HENT: Negative for congestion, ear discharge, ear pain, nosebleeds, rhinorrhea, sinus pressure, sore  throat and trouble swallowing.    Eyes: Negative for discharge and redness.   Respiratory: Negative for apnea, cough, chest tightness, shortness of breath and wheezing.    Cardiovascular: Negative for chest pain.   Gastrointestinal: Positive for abdominal pain and nausea. Negative for diarrhea and vomiting.   Endocrine: Negative for polyuria.   Genitourinary: Negative for dysuria, frequency and urgency.   Musculoskeletal: Negative for myalgias and neck pain.   Skin: Negative for color change and rash.   Allergic/Immunologic: Negative for immunocompromised state.   Neurological: Negative for dizziness, seizures, syncope, weakness, light-headedness and headaches.   Hematological: Negative for adenopathy. Does not bruise/bleed easily.   Psychiatric/Behavioral: Negative for behavioral problems and confusion.   All other systems reviewed and are negative.      Past Medical History:   Diagnosis Date   • Acneiform eruption    • Amenorrhea    • Anxiety    • Bipolar disorder (CMS/HCC)    • Disease of thyroid gland    • History of incompetent cervix, currently pregnant    • Infectious viral hepatitis     HEPATITIS A   • RhD negative    • Smoker    • Tobacco dependence syndrome    • URI (upper respiratory infection)    • Urogenital trichomoniasis    • Varicella        No Known Allergies    Past Surgical History:   Procedure Laterality Date   • CERVIX SURGERY  2005    Revision of cervix (2)    incompetent cervix, cerclage    • DILATATION AND CURETTAGE     • PAP SMEAR      benign cellular changes reactive cellular changes. negative for intraepithelial lesion or malignancy       Family History   Problem Relation Age of Onset   • Hypertension Mother    • Lung cancer Paternal Grandfather    • Bipolar disorder Other    • Diabetes Other    • Asthma Son    • No Known Problems Daughter    • Cancer Maternal Grandmother    • Diabetes Maternal Grandfather    • No Known Problems Son    • No Known Problems Son    • No Known Problems  Daughter        Social History     Socioeconomic History   • Marital status: Single   Tobacco Use   • Smoking status: Current Every Day Smoker     Packs/day: 0.50     Years: 17.00     Pack years: 8.50     Types: Cigarettes   • Smokeless tobacco: Never Used   Substance and Sexual Activity   • Alcohol use: No   • Drug use: Not Currently     Types: Marijuana   • Sexual activity: Yes     Partners: Male     Comment: LAST PAP SMEAR 2/12/18 NEGATIVE, HPV NEGATIVE            Objective   Physical Exam  Vitals and nursing note reviewed.   Constitutional:       Appearance: She is well-developed.   HENT:      Head: Normocephalic and atraumatic.      Nose: Nose normal.   Eyes:      General: No scleral icterus.        Right eye: No discharge.         Left eye: No discharge.      Conjunctiva/sclera: Conjunctivae normal.      Pupils: Pupils are equal, round, and reactive to light.   Neck:      Trachea: No tracheal deviation.   Cardiovascular:      Rate and Rhythm: Normal rate and regular rhythm.      Heart sounds: Normal heart sounds. No murmur heard.  Pulmonary:      Effort: Pulmonary effort is normal. No respiratory distress.      Breath sounds: Normal breath sounds. No stridor. No wheezing or rales.   Abdominal:      General: Bowel sounds are normal. There is no distension.      Palpations: Abdomen is soft. There is no mass.      Tenderness: There is abdominal tenderness in the left lower quadrant. There is left CVA tenderness. There is no guarding or rebound.   Musculoskeletal:      Cervical back: Normal range of motion and neck supple.   Skin:     General: Skin is warm and dry.      Findings: No erythema or rash.   Neurological:      Mental Status: She is alert and oriented to person, place, and time.      Coordination: Coordination normal.   Psychiatric:         Behavior: Behavior normal.         Thought Content: Thought content normal.         Procedures          ED Course                   Labs Reviewed   COMPREHENSIVE  METABOLIC PANEL - Abnormal; Notable for the following components:       Result Value    Glucose 106 (*)     Chloride 108 (*)     Calcium 8.5 (*)     Albumin 3.30 (*)     All other components within normal limits    Narrative:     GFR Normal >60  Chronic Kidney Disease <60  Kidney Failure <15     LIPASE - Abnormal; Notable for the following components:    Lipase 11 (*)     All other components within normal limits   URINALYSIS W/ CULTURE IF INDICATED - Abnormal; Notable for the following components:    Appearance, UA Cloudy (*)     Ketones, UA Trace (*)     Blood, UA Moderate (2+) (*)     Protein, UA 30 mg/dL (1+) (*)     Leuk Esterase, UA Large (3+) (*)     All other components within normal limits    Narrative:     In absence of clinical symptoms, the presence of pyuria, bacteria, and/or nitrites on the urinalysis result does not correlate with infection.   CBC WITH AUTO DIFFERENTIAL - Abnormal; Notable for the following components:    WBC 17.83 (*)     RBC 3.65 (*)     Hemoglobin 10.6 (*)     Hematocrit 31.3 (*)     Neutrophil % 89.8 (*)     Lymphocyte % 3.9 (*)     Eosinophil % 0.2 (*)     Immature Grans % 0.7 (*)     Neutrophils, Absolute 16.02 (*)     Monocytes, Absolute 0.91 (*)     Immature Grans, Absolute 0.12 (*)     All other components within normal limits   URINALYSIS, MICROSCOPIC ONLY - Abnormal; Notable for the following components:    RBC, UA 3-5 (*)     WBC, UA Too Numerous to Count (*)     Squamous Epithelial Cells, UA 3-5 (*)     All other components within normal limits   HCG, SERUM, QUALITATIVE - Normal   LACTIC ACID, PLASMA - Normal   URINE CULTURE   BLOOD CULTURE   BLOOD CULTURE   COVID-19 AND FLU A/B, NP SWAB IN TRANSPORT MEDIA 8-12 HR TAT   CBC AND DIFFERENTIAL    Narrative:     The following orders were created for panel order CBC & Differential.  Procedure                               Abnormality         Status                     ---------                               -----------          ------                     CBC Auto Differential[[id number]]        Abnormal            Final result                 Please view results for these tests on the individual orders.   EXTRA TUBES    Narrative:     The following orders were created for panel order Extra Tubes.  Procedure                               Abnormality         Status                     ---------                               -----------         ------                     Light Blue Top[[id number]]                                   Final result                 Please view results for these tests on the individual orders.   LIGHT BLUE TOP       CT Abdomen Pelvis Without Contrast   Final Result   *  Severe left-sided hydronephrosis including severe   pelvicaliectasis with no significant ureterectasis. There is also   a prominent degree of left perinephric stranding/edema with fluid   tracking along the left paracolic gutter. There is no visualized   obstructing calculus.    *  It is uncertain whether this is associated with recently   passed urinary tract calculus or a nonvisualized calculus.    *  Urology consultation would be recommended.   *  Small amount of free fluid within the cul-de-sac.   *  Mild to moderate increased stool within the colon.      Electronically signed by:  Jovanny Dye DO  8/26/2022 5:49 AM CDT   Workstation: [id number]                                       [facility]    Final diagnoses:   Pelvicaliectasis   Acute cystitis without hematuria       ED Disposition  ED Disposition     ED Disposition   Decision to Admit    Condition   --    Comment   Level of Care: Telemetry [5]   Diagnosis: Pelvicaliectasis [508763]   Admitting Physician: BEHROOZI, SAEID [[id number]]   Attending Physician: BEHROOZI, SAEID [[id number]]   Certification: I Certify That Inpatient Hospital Services Are Medically Necessary For Greater Than 2 Midnights               No follow-up provider specified.       Medication List      No changes were made to your  prescriptions during this visit.          Ross Santos MD  08/26/22 0637      Electronically signed by Ross Santos MD at 08/26/22 0637       Operative/Procedure Notes (last 48 hours)  Notes from 08/26/22 1611 through 08/28/22 1611   No notes of this type exist for this encounter.          Physician Progress Notes (last 48 hours)      Amy Killian APRN at 08/28/22 0999              [facility] Medicine Services  INPATIENT PROGRESS NOTE    Length of Stay: 1  Date of Admission: 8/26/2022  Primary Care Physician: Provider, No Known    Subjective   Chief Complaint: Left flank pain.     HPI: This is a 37-year-old female with past medical history of bipolar disorder, thyroid disease and tobacco abuse that presented to [facility] with complaints of left flank pain.  CT of the abdomen and pelvis indicated severe left-sided hydronephrosis including severe pelviectasis with no significant ureterectasis.      The patient was admitted for urosepsis with acute left pyelonephritis.  Urology was consulted and the patient was started on IV Zosyn, antiemetics and pain control.  Cystoscopy was done by Dr. Abraham on 8/28/2022.    Review of Systems   Constitutional: Negative for activity change and fatigue.   HENT: Negative for ear pain and sore throat.    Eyes: Negative for pain and discharge.   Respiratory: Negative for cough and shortness of breath.    Cardiovascular: Negative for chest pain and palpitations.   Gastrointestinal: Negative for abdominal pain and nausea.   Endocrine: Negative for cold intolerance and heat intolerance.   Genitourinary: Positive for flank pain. Negative for difficulty urinating and dysuria.   Musculoskeletal: Negative for arthralgias and gait problem.   Skin: Negative for color change and rash.   Neurological: Negative for dizziness and weakness.   Psychiatric/Behavioral: Negative for agitation and confusion.        Objective    Temp:  [97.1 °F  (36.2 °C)-98.8 °F (37.1 °C)] 98.8 °F (37.1 °C)  Heart Rate:  [] 105  Resp:  [18-20] 20  BP: (113-130)/(62-81) 122/78    Physical Exam  Constitutional:       Appearance: She is well-developed.   HENT:      Head: Normocephalic and atraumatic.   Eyes:      Pupils: Pupils are equal, round, and reactive to light.   Cardiovascular:      Rate and Rhythm: Normal rate and regular rhythm.   Pulmonary:      Effort: Pulmonary effort is normal.      Breath sounds: Normal breath sounds.   Abdominal:      General: Bowel sounds are normal.      Palpations: Abdomen is soft.   Musculoskeletal:         General: Normal range of motion.      Cervical back: Normal range of motion and neck supple.   Skin:     General: Skin is warm and dry.   Neurological:      Mental Status: She is alert and oriented to person, place, and time.   Psychiatric:         Behavior: Behavior normal.       Results Review:  I have reviewed the labs, radiology results, and diagnostic studies.    Laboratory Data:   Results from last 7 days   Lab Units 08/27/22  0811 08/26/22  0336   SODIUM mmol/L 137 139   POTASSIUM mmol/L 3.4* 3.9   CHLORIDE mmol/L 105 108*   CO2 mmol/L 26.0 24.0   BUN mg/dL 14 16   CREATININE mg/dL 0.98 0.99   GLUCOSE mg/dL 121* 106*   CALCIUM mg/dL 8.6 8.5*   BILIRUBIN mg/dL  --  0.6   ALK PHOS U/L  --  72   ALT (SGPT) U/L  --  9   AST (SGOT) U/L  --  9   ANION GAP mmol/L 6.0 7.0     Estimated Creatinine Clearance: 102.7 mL/min (by C-G formula based on SCr of 0.98 mg/dL).  Results from last 7 days   Lab Units 08/27/22  0811   MAGNESIUM mg/dL 1.8         Results from last 7 days   Lab Units 08/27/22  0811 08/26/22  0336   WBC 10*3/mm3 15.53* 17.83*   HEMOGLOBIN g/dL 9.6* 10.6*   HEMATOCRIT % 29.0* 31.3*   PLATELETS 10*3/mm3 171 192           Culture Data:   Blood Culture   Date Value Ref Range Status   08/26/2022 No growth at 2 days  Preliminary   08/26/2022 No growth at 2 days  Preliminary     Urine Culture   Date Value Ref Range Status    08/26/2022 25,000 CFU/mL Mixed Leila Isolated  Final     No results found for: RESPCX  No results found for: WOUNDCX  No results found for: STOOLCX  No components found for: BODYFLD    Radiology Data:   Imaging Results (Last 24 Hours)     Procedure Component Value Units Date/Time    FL Retrograde Pyelogram In OR [[id number]] Resulted: 08/28/22 0726     Updated: 08/28/22 0757          I have reviewed the patient's current medications.     Assessment/Plan     Active Hospital Problems    Diagnosis    • Pelvicaliectasis        Plan:    1.  Urosepsis: Continue IV fluids.  Urine and blood cultures pending.  Continue broad-spectrum antibiotics.    2.  Severe left-sided hydronephrosis/pelviectasis: Urology consult appreciated. Cystoscopy today.  3.  Anemia, iron deficiency: Oral iron started.      DVT PPx:  SCDs.      Discharge Planning: I expect patient to be discharged to home in 1-2 days.    I confirmed that the patient's Advance Care Plan is present, code status is documented, or surrogate decision maker is listed in the patient's medical record.      I have utilized all available immediate resources to obtain, update, or review the patient's current medications.         This document has been electronically signed by KAISER Duarte on August 28, 2022 09:33 CDT          Electronically signed by Amy Killian APRN at 08/28/22 0934     [place] MD at 08/28/22 0717           LOS: 1 day     Patient Care Team:  Provider, No Known as PCP - General  Fredy Johns MD (Family Medicine)  Wellington Selby MD as Resident (Family Medicine)  Robbie Garcia MD as Resident (Family Medicine)  Steve Shay MD as Resident (Family Medicine)  Emily Reid MD as Resident (Family Medicine)      Subjective     Left hydroureteronephrosis    Objective       Vital Signs  Temp:  [97.3 °F (36.3 °C)-98.7 °F (37.1 °C)] 98.7 °F (37.1 °C)  Heart Rate:  [] 109  Resp:  [18] 18  BP: (113-123)/(62-79)  116/79    Physical Exam:        General Appearance:   Hurting     Respiratory:    UNLABORED RESPIRATIONS.     Abdomen:     SOFT.       Genitourinary:  Urine clear     Rectal:     DEFERRED       Results Review:       Imaging Results (Last 24 Hours)     ** No results found for the last 24 hours. **        Lab Results (last 24 hours)     Procedure Component Value Units Date/Time    Blood Culture - Blood, Arm, Right [[id number]]  (Normal) Collected: 08/26/22 0658    Specimen: Blood from Arm, Right Updated: 08/28/22 0715     Blood Culture No growth at 2 days    Blood Culture - Blood, Arm, Left [[id number]]  (Normal) Collected: 08/26/22 0435    Specimen: Blood from Arm, Left Updated: 08/28/22 0446     Blood Culture No growth at 2 days    Urine Culture - Urine, Urine, Clean Catch [[id number]] Collected: 08/26/22 0352    Specimen: Urine, Clean Catch Updated: 08/27/22 1136     Urine Culture 25,000 CFU/mL Mixed Leila Isolated    Narrative:      Specimen contains mixed organisms of questionable pathogenicity suggestive of contamination. If symptoms persist, suggest recollection.  Colonization of the urinary tract without infection is common. Treatment is discouraged unless the patient is symptomatic, pregnant, or undergoing an invasive urologic procedure.    Hemoglobin A1c [[id number]]  (Normal) Collected: 08/27/22 0811    Specimen: Blood Updated: 08/27/22 0843     Hemoglobin A1C 5.10 %     Narrative:      Hemoglobin A1C Ranges:    Increased Risk for Diabetes  5.7% to 6.4%  Diabetes                     >= 6.5%  Diabetic Goal                < 7.0%    TSH [[id number]]  (Abnormal) Collected: 08/27/22 0811    Specimen: Blood Updated: 08/27/22 0842     TSH 4.600 uIU/mL     Basic Metabolic Panel [[id number]]  (Abnormal) Collected: 08/27/22 0811    Specimen: Blood Updated: 08/27/22 0836     Glucose 121 mg/dL      BUN 14 mg/dL      Creatinine 0.98 mg/dL      Sodium 137 mmol/L      Potassium 3.4 mmol/L      Chloride 105 mmol/L      CO2 26.0  mmol/L      Calcium 8.6 mg/dL      BUN/Creatinine Ratio 14.3     Anion Gap 6.0 mmol/L      eGFR 76.9 mL/min/1.73      Comment: National Kidney Foundation and American Society of Nephrology (ASN) Task Force recommended calculation based on the Chronic Kidney Disease Epidemiology Collaboration (CKD-EPI) equation refit without adjustment for race.       Narrative:      GFR Normal >60  Chronic Kidney Disease <60  Kidney Failure <15      Magnesium [[id number]]  (Normal) Collected: 08/27/22 0811    Specimen: Blood Updated: 08/27/22 0836     Magnesium 1.8 mg/dL     CBC (No Diff) [[id number]]  (Abnormal) Collected: 08/27/22 0811    Specimen: Blood Updated: 08/27/22 0817     WBC 15.53 10*3/mm3      RBC 3.38 10*6/mm3      Hemoglobin 9.6 g/dL      Hematocrit 29.0 %      MCV 85.8 fL      MCH 28.4 pg      MCHC 33.1 g/dL      RDW 14.6 %      RDW-SD 46.3 fl      MPV 10.4 fL      Platelets 171 10*3/mm3             I reviewed the patient's new clinical results.  I reviewed the patient's new imaging results and agree with the interpretation.  I reviewed the patient's other test results and agree with the interpretation        Assessment left hydroureteronephrosis    Plan:     Cystoscopy retrograde ureteroscopy laser lithotripsy J stent placement risk and benefits of been discussed      Popeye Abraham MD  08/28/22  07:18 CDT      Electronically signed by Popeye Abraham MD at 08/28/22 0718     Amy Killian APRN at 08/27/22 1639              [facility] Medicine Services  INPATIENT PROGRESS NOTE    Length of Stay: 1  Date of Admission: 8/26/2022  Primary Care Physician: Provider, No Known    Subjective   Chief Complaint: Left flank pain.     HPI: This is a 37-year-old female with past medical history of bipolar disorder, thyroid disease and tobacco abuse that presented to [facility] on 8/26/2022 with complaints of left flank pain.  CT of the abdomen and pelvis indicated severe left-sided hydronephrosis  including severe pelviectasis with no significant ureterectasis.      The patient was admitted for urosepsis with acute left pyelonephritis.  Urology was consulted and the patient was started on IV Zosyn, antiemetics and pain control.    Review of Systems   Constitutional: Negative for activity change and fatigue.   HENT: Negative for ear pain and sore throat.    Eyes: Negative for pain and discharge.   Respiratory: Negative for cough and shortness of breath.    Cardiovascular: Negative for chest pain and palpitations.   Gastrointestinal: Negative for abdominal pain and nausea.   Endocrine: Negative for cold intolerance and heat intolerance.   Genitourinary: Positive for flank pain. Negative for difficulty urinating and dysuria.   Musculoskeletal: Negative for arthralgias and gait problem.   Skin: Negative for color change and rash.   Neurological: Negative for dizziness and weakness.   Psychiatric/Behavioral: Negative for agitation and confusion.        Objective    Temp:  [97.3 °F (36.3 °C)-100.3 °F (37.9 °C)] 97.3 °F (36.3 °C)  Heart Rate:  [] 105  Resp:  [18] 18  BP: (110-123)/(58-74) 123/74    Physical Exam  Constitutional:       Appearance: She is well-developed.   HENT:      Head: Normocephalic and atraumatic.   Eyes:      Pupils: Pupils are equal, round, and reactive to light.   Cardiovascular:      Rate and Rhythm: Normal rate and regular rhythm.   Pulmonary:      Effort: Pulmonary effort is normal.      Breath sounds: Normal breath sounds.   Abdominal:      General: Bowel sounds are normal.      Palpations: Abdomen is soft.   Musculoskeletal:         General: Normal range of motion.      Cervical back: Normal range of motion and neck supple.   Skin:     General: Skin is warm and dry.   Neurological:      Mental Status: She is alert and oriented to person, place, and time.   Psychiatric:         Behavior: Behavior normal.       Results Review:  I have reviewed the labs, radiology results, and  diagnostic studies.    Laboratory Data:   Results from last 7 days   Lab Units 08/27/22  0811 08/26/22  0336   SODIUM mmol/L 137 139   POTASSIUM mmol/L 3.4* 3.9   CHLORIDE mmol/L 105 108*   CO2 mmol/L 26.0 24.0   BUN mg/dL 14 16   CREATININE mg/dL 0.98 0.99   GLUCOSE mg/dL 121* 106*   CALCIUM mg/dL 8.6 8.5*   BILIRUBIN mg/dL  --  0.6   ALK PHOS U/L  --  72   ALT (SGPT) U/L  --  9   AST (SGOT) U/L  --  9   ANION GAP mmol/L 6.0 7.0     Estimated Creatinine Clearance: 102.7 mL/min (by C-G formula based on SCr of 0.98 mg/dL).  Results from last 7 days   Lab Units 08/27/22  0811   MAGNESIUM mg/dL 1.8         Results from last 7 days   Lab Units 08/27/22  0811 08/26/22  0336   WBC 10*3/mm3 15.53* 17.83*   HEMOGLOBIN g/dL 9.6* 10.6*   HEMATOCRIT % 29.0* 31.3*   PLATELETS 10*3/mm3 171 192           Culture Data:   Blood Culture   Date Value Ref Range Status   08/26/2022 No growth at 24 hours  Preliminary   08/26/2022 No growth at 24 hours  Preliminary     Urine Culture   Date Value Ref Range Status   08/26/2022 25,000 CFU/mL Mixed Leila Isolated  Final     No results found for: RESPCX  No results found for: WOUNDCX  No results found for: STOOLCX  No components found for: BODYFLD    Radiology Data:   Imaging Results (Last 24 Hours)     ** No results found for the last 24 hours. **          I have reviewed the patient's current medications.     Assessment/Plan     Active Hospital Problems    Diagnosis    • Pelvicaliectasis        Plan:    1.  Urosepsis: Continue IV fluids.  Urine and blood cultures pending.  Continue broad-spectrum antibiotics.    2.  Severe left-sided hydronephrosis/pelviectasis: Urology consult appreciated. Cystoscopy in am.  3.  Anemia, iron deficiency: Oral iron started.      DVT PPx:  SCDs.      Discharge Planning: I expect patient to be discharged to home in 1-2 days.    I confirmed that the patient's Advance Care Plan is present, code status is documented, or surrogate decision maker is listed in the  "patient's medical record.      I have utilized all available immediate resources to obtain, update, or review the patient's current medications.         This document has been electronically signed by KAISER Duarte on August 27, 2022 16:39 CDT          Electronically signed by Amy Killian APRN at 08/27/22 1642     Gregory Steen APRN at 08/27/22 1016     Attestation signed by Popeye Abraham MD at 08/27/22 1134    I have reviewed this documentation and agree.                     LOS: 1 day   Patient Care Team:  Provider, No Known as PCP - General  Fredy Johns MD (Family Medicine)  Wellington Selby MD as Resident (Family Medicine)  Robbie Garcia MD as Resident (Family Medicine)  Steve Shay MD as Resident (Family Medicine)  Emily Reid MD as Resident (Family Medicine)    Subjective     Subjective:  Symptoms:  Stable.    Diet:  No vomiting.    Pain:  Pain is requiring pain medication.        History taken from: patient chart    Objective     Vital Signs  Temp:  [98.3 °F (36.8 °C)-100.3 °F (37.9 °C)] 100.3 °F (37.9 °C)  Heart Rate:  [] 94  Resp:  [16-18] 18  BP: ()/(58-80) 116/74    Objective:  General Appearance:  In no acute distress.    Vital signs: (most recent): Blood pressure 116/74, pulse 94, temperature 100.3 °F (37.9 °C), temperature source Oral, resp. rate 18, height 167.6 cm (66\"), weight 116 kg (256 lb), last menstrual period 08/08/2022, SpO2 100 %, not currently breastfeeding.  (TMAX 100.3).    Output: Producing urine.    Lungs:  Normal effort.    Neurological: Patient is alert and oriented to person, place and time.    Pupils:  Pupils are equal, round, and reactive to light.    Skin:  No rash, ecchymosis or cyanosis.             Results Review:    Lab Results (last 24 hours)     Procedure Component Value Units Date/Time    Hemoglobin A1c [[id number]]  (Normal) Collected: 08/27/22 0811    Specimen: Blood Updated: 08/27/22 0843     Hemoglobin A1C 5.10 %     " Narrative:      Hemoglobin A1C Ranges:    Increased Risk for Diabetes  5.7% to 6.4%  Diabetes                     >= 6.5%  Diabetic Goal                < 7.0%    TSH [[id number]]  (Abnormal) Collected: 08/27/22 0811    Specimen: Blood Updated: 08/27/22 0842     TSH 4.600 uIU/mL     Basic Metabolic Panel [[id number]]  (Abnormal) Collected: 08/27/22 0811    Specimen: Blood Updated: 08/27/22 0836     Glucose 121 mg/dL      BUN 14 mg/dL      Creatinine 0.98 mg/dL      Sodium 137 mmol/L      Potassium 3.4 mmol/L      Chloride 105 mmol/L      CO2 26.0 mmol/L      Calcium 8.6 mg/dL      BUN/Creatinine Ratio 14.3     Anion Gap 6.0 mmol/L      eGFR 76.9 mL/min/1.73      Comment: National Kidney Foundation and American Society of Nephrology (ASN) Task Force recommended calculation based on the Chronic Kidney Disease Epidemiology Collaboration (CKD-EPI) equation refit without adjustment for race.       Narrative:      GFR Normal >60  Chronic Kidney Disease <60  Kidney Failure <15      Magnesium [[id number]]  (Normal) Collected: 08/27/22 0811    Specimen: Blood Updated: 08/27/22 0836     Magnesium 1.8 mg/dL     CBC (No Diff) [[id number]]  (Abnormal) Collected: 08/27/22 0811    Specimen: Blood Updated: 08/27/22 0817     WBC 15.53 10*3/mm3      RBC 3.38 10*6/mm3      Hemoglobin 9.6 g/dL      Hematocrit 29.0 %      MCV 85.8 fL      MCH 28.4 pg      MCHC 33.1 g/dL      RDW 14.6 %      RDW-SD 46.3 fl      MPV 10.4 fL      Platelets 171 10*3/mm3     Blood Culture - Blood, Arm, Right [[id number]]  (Normal) Collected: 08/26/22 0658    Specimen: Blood from Arm, Right Updated: 08/27/22 0717     Blood Culture No growth at 24 hours    Blood Culture - Blood, Arm, Left [[id number]]  (Normal) Collected: 08/26/22 0435    Specimen: Blood from Arm, Left Updated: 08/27/22 0448     Blood Culture No growth at 24 hours    Iron Profile [[id number]]  (Abnormal) Collected: 08/26/22 0336    Specimen: Blood Updated: 08/26/22 1643     Iron 10 mcg/dL      Iron  Saturation 3 %      Transferrin 218 mg/dL      TIBC 325 mcg/dL          Imaging Results (Last 24 Hours)     ** No results found for the last 24 hours. **           I reviewed the patient's new clinical results.  I reviewed the patient's new imaging results and agree with the interpretation.  I reviewed the patient's other test results and agree with the interpretation      Assessment & Plan       Pelvicaliectasis      Assessment & Plan    Mackenzie RIVAS is a 37 y.o. female who has severe left hydronephrosis with pyelonephritis,   Estimated Creatinine Clearance: 102.7 mL/min (by C-G formula based on SCr of 0.98 mg/dL).  Plan NPO after midnight for cystoscopy left retrograde J-stent placement in AM with Dr. Abraham.     [facility] Judd  08/27/22  10:16 CDT        Electronically signed by Popeye Abraham MD at 08/27/22 7596

## 2022-08-29 LAB
ALBUMIN SERPL-MCNC: 3.1 G/DL (ref 3.5–5.2)
ALBUMIN/GLOB SERPL: 1 G/DL
ALP SERPL-CCNC: 110 U/L (ref 39–117)
ALT SERPL W P-5'-P-CCNC: 9 U/L (ref 1–33)
ANION GAP SERPL CALCULATED.3IONS-SCNC: 8 MMOL/L (ref 5–15)
ANISOCYTOSIS BLD QL: ABNORMAL
AST SERPL-CCNC: 8 U/L (ref 1–32)
BILIRUB SERPL-MCNC: 0.9 MG/DL (ref 0–1.2)
BUN SERPL-MCNC: 11 MG/DL (ref 6–20)
BUN/CREAT SERPL: 13.8 (ref 7–25)
CALCIUM SPEC-SCNC: 8.4 MG/DL (ref 8.6–10.5)
CHLORIDE SERPL-SCNC: 103 MMOL/L (ref 98–107)
CO2 SERPL-SCNC: 26 MMOL/L (ref 22–29)
CREAT SERPL-MCNC: 0.8 MG/DL (ref 0.57–1)
DEPRECATED RDW RBC AUTO: 44.3 FL (ref 37–54)
EGFRCR SERPLBLD CKD-EPI 2021: 98.1 ML/MIN/1.73
EOSINOPHIL # BLD MANUAL: 0.21 10*3/MM3 (ref 0–0.4)
EOSINOPHIL NFR BLD MANUAL: 2 % (ref 0.3–6.2)
ERYTHROCYTE [DISTWIDTH] IN BLOOD BY AUTOMATED COUNT: 14.3 % (ref 12.3–15.4)
GLOBULIN UR ELPH-MCNC: 3.1 GM/DL
GLUCOSE SERPL-MCNC: 108 MG/DL (ref 65–99)
HCT VFR BLD AUTO: 24.1 % (ref 34–46.6)
HGB BLD-MCNC: 8 G/DL (ref 12–15.9)
HYPOCHROMIA BLD QL: ABNORMAL
LYMPHOCYTES # BLD MANUAL: 1.13 10*3/MM3 (ref 0.7–3.1)
LYMPHOCYTES NFR BLD MANUAL: 3 % (ref 5–12)
MCH RBC QN AUTO: 28.2 PG (ref 26.6–33)
MCHC RBC AUTO-ENTMCNC: 33.2 G/DL (ref 31.5–35.7)
MCV RBC AUTO: 84.9 FL (ref 79–97)
MONOCYTES # BLD: 0.31 10*3/MM3 (ref 0.1–0.9)
NEUTROPHILS # BLD AUTO: 8.61 10*3/MM3 (ref 1.7–7)
NEUTROPHILS NFR BLD MANUAL: 83 % (ref 42.7–76)
NEUTS BAND NFR BLD MANUAL: 1 % (ref 0–5)
PLATELET # BLD AUTO: 193 10*3/MM3 (ref 140–450)
PMV BLD AUTO: 11 FL (ref 6–12)
POTASSIUM SERPL-SCNC: 3.8 MMOL/L (ref 3.5–5.2)
PROT SERPL-MCNC: 6.2 G/DL (ref 6–8.5)
RBC # BLD AUTO: 2.84 10*6/MM3 (ref 3.77–5.28)
SMALL PLATELETS BLD QL SMEAR: ADEQUATE
SODIUM SERPL-SCNC: 137 MMOL/L (ref 136–145)
VARIANT LYMPHS NFR BLD MANUAL: 11 % (ref 19.6–45.3)
WBC MORPH BLD: NORMAL
WBC NRBC COR # BLD: 10.25 10*3/MM3 (ref 3.4–10.8)

## 2022-08-29 PROCEDURE — 25010000002 ONDANSETRON PER 1 MG: Performed by: UROLOGY

## 2022-08-29 PROCEDURE — 25010000002 PIPERACILLIN SOD-TAZOBACTAM PER 1 G: Performed by: UROLOGY

## 2022-08-29 PROCEDURE — 85025 COMPLETE CBC W/AUTO DIFF WBC: CPT | Performed by: NURSE PRACTITIONER

## 2022-08-29 PROCEDURE — 25010000002 HYDROMORPHONE 1 MG/ML SOLUTION: Performed by: NURSE PRACTITIONER

## 2022-08-29 PROCEDURE — 80053 COMPREHEN METABOLIC PANEL: CPT | Performed by: NURSE PRACTITIONER

## 2022-08-29 PROCEDURE — 85007 BL SMEAR W/DIFF WBC COUNT: CPT | Performed by: NURSE PRACTITIONER

## 2022-08-29 RX ORDER — POLYETHYLENE GLYCOL 3350 17 G/17G
17 POWDER, FOR SOLUTION ORAL DAILY
Status: DISCONTINUED | OUTPATIENT
Start: 2022-08-29 | End: 2022-08-31 | Stop reason: HOSPADM

## 2022-08-29 RX ORDER — BISACODYL 5 MG/1
10 TABLET, DELAYED RELEASE ORAL DAILY PRN
Status: DISCONTINUED | OUTPATIENT
Start: 2022-08-29 | End: 2022-08-30

## 2022-08-29 RX ADMIN — FAMOTIDINE 20 MG: 20 TABLET ORAL at 09:30

## 2022-08-29 RX ADMIN — HYDROMORPHONE HYDROCHLORIDE 0.25 MG: 1 INJECTION, SOLUTION INTRAMUSCULAR; INTRAVENOUS; SUBCUTANEOUS at 22:30

## 2022-08-29 RX ADMIN — DOCUSATE SODIUM 100 MG: 100 CAPSULE, LIQUID FILLED ORAL at 09:30

## 2022-08-29 RX ADMIN — FAMOTIDINE 20 MG: 20 TABLET ORAL at 18:30

## 2022-08-29 RX ADMIN — ACETAMINOPHEN 650 MG: 325 TABLET, FILM COATED ORAL at 19:39

## 2022-08-29 RX ADMIN — PIPERACILLIN AND TAZOBACTAM 4.5 G: 4; .5 INJECTION, POWDER, LYOPHILIZED, FOR SOLUTION INTRAVENOUS; PARENTERAL at 09:31

## 2022-08-29 RX ADMIN — OXYCODONE HYDROCHLORIDE AND ACETAMINOPHEN 1 TABLET: 5; 325 TABLET ORAL at 16:31

## 2022-08-29 RX ADMIN — OXYCODONE HYDROCHLORIDE AND ACETAMINOPHEN 1 TABLET: 5; 325 TABLET ORAL at 10:45

## 2022-08-29 RX ADMIN — PIPERACILLIN AND TAZOBACTAM 4.5 G: 4; .5 INJECTION, POWDER, LYOPHILIZED, FOR SOLUTION INTRAVENOUS; PARENTERAL at 18:30

## 2022-08-29 RX ADMIN — BISACODYL 10 MG: 5 TABLET, COATED ORAL at 10:45

## 2022-08-29 RX ADMIN — KETOROLAC TROMETHAMINE 10 MG: 10 TABLET, FILM COATED ORAL at 18:37

## 2022-08-29 RX ADMIN — FERROUS SULFATE TAB EC 324 MG (65 MG FE EQUIVALENT) 324 MG: 324 (65 FE) TABLET DELAYED RESPONSE at 09:30

## 2022-08-29 RX ADMIN — OXYCODONE HYDROCHLORIDE AND ACETAMINOPHEN 1 TABLET: 5; 325 TABLET ORAL at 03:46

## 2022-08-29 RX ADMIN — ONDANSETRON 4 MG: 2 INJECTION INTRAMUSCULAR; INTRAVENOUS at 10:45

## 2022-08-29 RX ADMIN — ONDANSETRON 4 MG: 2 INJECTION INTRAMUSCULAR; INTRAVENOUS at 18:37

## 2022-08-29 RX ADMIN — Medication 10 ML: at 09:32

## 2022-08-29 RX ADMIN — ONDANSETRON 4 MG: 2 INJECTION INTRAMUSCULAR; INTRAVENOUS at 05:05

## 2022-08-29 RX ADMIN — PIPERACILLIN AND TAZOBACTAM 4.5 G: 4; .5 INJECTION, POWDER, LYOPHILIZED, FOR SOLUTION INTRAVENOUS; PARENTERAL at 02:01

## 2022-08-29 RX ADMIN — DOCUSATE SODIUM 100 MG: 100 CAPSULE, LIQUID FILLED ORAL at 19:39

## 2022-08-29 RX ADMIN — POLYETHYLENE GLYCOL 3350 17 G: 17 POWDER, FOR SOLUTION ORAL at 09:39

## 2022-08-29 RX ADMIN — ACETAMINOPHEN 650 MG: 325 TABLET, FILM COATED ORAL at 09:39

## 2022-08-29 RX ADMIN — Medication 10 ML: at 22:00

## 2022-08-29 NOTE — PLAN OF CARE
Goal Outcome Evaluation:  Plan of Care Reviewed With: patient        Progress: improving  Outcome Evaluation: VSS. Patient rested well. Pain better managed. New IV.

## 2022-08-29 NOTE — OP NOTE
CYSTOSCOPY RETROGRADE PYELOGRAM AND STENT INSERTION  Procedure Note    Mackenzie RIVAS  8/28/2022    Pre-op Diagnosis:   Hydronephrosis, unspecified hydronephrosis type [N13.30]    Post-op Diagnosis:     Post-Op Diagnosis Codes:     * Hydronephrosis, unspecified hydronephrosis type [N13.30]    Procedure(s):  CYSTOSCOPY LEFT RETROGRADE PYELOGRAM AND STENT INSERTION    Surgeon(s):  Popeye Abraham MD    Anesthesia: Choice    Staff:   Circulator: Britney Malone RN  Radiology Technologist: Sharmila Aguila  Scrub Person: Keiko Sellers          Estimated Blood Loss: none    Specimens:                ID Type Source Tests Collected by Time   1 (Not marked as sent) :  Urine Kidney, Left URINE CULTURE Popeye Abraham MD 8/28/2022 0750         Drains: * No LDAs found *    Findings: Hydro nephrotic left kidney    Complications: None    Indications: Same    Description of Procedure: Patient brought to surgery placed in the dorsal lithotomy position sterile prep and drape genitalia routine fashion.  I passed a 22 Khmer cystoscope into the bladder left ureter was catheterized and 6 cc contrast placed up the ureter.  I put a retrograde catheter into the left renal pelvis and lee pus out of the kidney this was sent for culture..  I then put an 035 Glidewire in the left kidney and then over the top of the guidewire placed a 6 x 28 J stent scope was removed patient taken        Popeye Abraham MD     Date: 8/28/2022  Time: 21:45 CDT

## 2022-08-29 NOTE — PROGRESS NOTES
"   LOS: 2 days   Patient Care Team:  Provider, No Known as PCP - Fredy Cordova MD (Family Medicine)  Wellington Selby MD as Resident (Family Medicine)  Robbie Garcia MD as Resident (Family Medicine)  Steve Shay MD as Resident (Family Medicine)  Emily Reid MD as Resident (Family Medicine)    Subjective     Subjective:  Symptoms:  Stable.  She reports malaise.  (Hematuria clearing).    Diet:  No vomiting.    Pain:  Pain is requiring pain medication.        History taken from: patient chart    Objective     Vital Signs  Temp:  [98.5 °F (36.9 °C)-100.1 °F (37.8 °C)] 99.4 °F (37.4 °C)  Heart Rate:  [] 93  Resp:  [18-20] 18  BP: (112-141)/(62-86) 117/62    Objective:  General Appearance:  In no acute distress.    Vital signs: (most recent): Blood pressure 117/62, pulse 93, temperature 99.4 °F (37.4 °C), temperature source Oral, resp. rate 18, height 167.6 cm (66\"), weight 116 kg (255 lb 6.4 oz), last menstrual period 08/08/2022, SpO2 100 %, not currently breastfeeding.  Fever.  (TMAX 100.3-->100.1).    Output: Producing urine.    Lungs:  Normal effort.    Neurological: Patient is alert and oriented to person, place and time.    Pupils:  Pupils are equal, round, and reactive to light.    Skin:  No rash, ecchymosis or cyanosis.             Results Review:    Lab Results (last 24 hours)     Procedure Component Value Units Date/Time    Urine Culture - Urine, Kidney, Left [[id number]]  (Abnormal) Collected: 08/28/22 0750    Specimen: Urine from Kidney, Left Updated: 08/29/22 1157     Urine Culture 25,000 CFU/mL Gram Negative Bacilli    Narrative:      Colonization of the urinary tract without infection is common. Treatment is discouraged unless the patient is symptomatic, pregnant, or undergoing an invasive urologic procedure.    Manual Differential [[id number]]  (Abnormal) Collected: 08/29/22 0628    Specimen: Blood Updated: 08/29/22 0740     Neutrophil % 83.0 %      Lymphocyte % 11.0 %  "     Monocyte % 3.0 %      Eosinophil % 2.0 %      Bands %  1.0 %      Neutrophils Absolute 8.61 10*3/mm3      Lymphocytes Absolute 1.13 10*3/mm3      Monocytes Absolute 0.31 10*3/mm3      Eosinophils Absolute 0.21 10*3/mm3      Anisocytosis Slight/1+     Hypochromia Slight/1+     WBC Morphology Normal     Platelet Estimate Adequate    Blood Culture - Blood, Arm, Right [[id number]]  (Normal) Collected: 08/26/22 0658    Specimen: Blood from Arm, Right Updated: 08/29/22 0716     Blood Culture No growth at 3 days    Comprehensive Metabolic Panel [[id number]]  (Abnormal) Collected: 08/29/22 0628    Specimen: Blood Updated: 08/29/22 0708     Glucose 108 mg/dL      BUN 11 mg/dL      Creatinine 0.80 mg/dL      Sodium 137 mmol/L      Potassium 3.8 mmol/L      Chloride 103 mmol/L      CO2 26.0 mmol/L      Calcium 8.4 mg/dL      Total Protein 6.2 g/dL      Albumin 3.10 g/dL      ALT (SGPT) 9 U/L      AST (SGOT) 8 U/L      Alkaline Phosphatase 110 U/L      Total Bilirubin 0.9 mg/dL      Globulin 3.1 gm/dL      A/G Ratio 1.0 g/dL      BUN/Creatinine Ratio 13.8     Anion Gap 8.0 mmol/L      eGFR 98.1 mL/min/1.73      Comment: National Kidney Foundation and American Society of Nephrology (ASN) Task Force recommended calculation based on the Chronic Kidney Disease Epidemiology Collaboration (CKD-EPI) equation refit without adjustment for race.       Narrative:      GFR Normal >60  Chronic Kidney Disease <60  Kidney Failure <15      CBC & Differential [[id number]]  (Abnormal) Collected: 08/29/22 0628    Specimen: Blood Updated: 08/29/22 0652    Narrative:      The following orders were created for panel order CBC & Differential.  Procedure                               Abnormality         Status                     ---------                               -----------         ------                     CBC Auto Differential[[id number]]        Abnormal            Final result               Scan Slide[[id number]]                                                                     Please view results for these tests on the individual orders.    CBC Auto Differential [[id number]]  (Abnormal) Collected: 08/29/22 0628    Specimen: Blood Updated: 08/29/22 0652     WBC 10.25 10*3/mm3      RBC 2.84 10*6/mm3      Hemoglobin 8.0 g/dL      Hematocrit 24.1 %      MCV 84.9 fL      MCH 28.2 pg      MCHC 33.2 g/dL      RDW 14.3 %      RDW-SD 44.3 fl      MPV 11.0 fL      Platelets 193 10*3/mm3     Blood Culture - Blood, Arm, Left [[id number]]  (Normal) Collected: 08/26/22 0435    Specimen: Blood from Arm, Left Updated: 08/29/22 0447     Blood Culture No growth at 3 days         Imaging Results (Last 24 Hours)     Procedure Component Value Units Date/Time    FL Retrograde Pyelogram In OR [[id number]] Resulted: 08/29/22 0700     Updated: 08/29/22 0700           I reviewed the patient's new clinical results.  I reviewed the patient's new imaging results and agree with the interpretation.  I reviewed the patient's other test results and agree with the interpretation      Assessment & Plan       Pelvicaliectasis      Assessment & Plan    POD 1 cystoscopy left retrograde and stent placement, findings: hydronephrotic left kidney, purulent drainage from left kidney sent for culture, gram negative bacilli. Hematuria clearing.     Mackenzie RIVAS is a 37 y.o. female who has severe left hydronephrosis with pyelonephritis,   Estimated Creatinine Clearance: 125.8 mL/min (by C-G formula based on SCr of 0.8 mg/dL).     Plan:  Await culture, continue antibiotic.     KAISER Judd  08/29/22  16:54 CDT

## 2022-08-29 NOTE — PROGRESS NOTES
[facility] Medicine Services  INPATIENT PROGRESS NOTE    Length of Stay: 2  Date of Admission: 8/26/2022  Primary Care Physician: Provider, No Known    Subjective   Chief Complaint: Left flank pain.     HPI: This is a 37-year-old female with past medical history of bipolar disorder, thyroid disease and tobacco abuse that presented to [facility] on 8/26/2022 with complaints of left flank pain.  CT of the abdomen and pelvis indicated severe left-sided hydronephrosis including severe pelviectasis with no significant ureterectasis.      The patient was admitted for urosepsis with acute left pyelonephritis.  Urology was consulted and the patient was started on IV Zosyn, antiemetics and pain control.  Cystoscopy was done by Dr. Abraham on 8/28/2022.    Review of Systems   Constitutional: Negative for activity change and fatigue.   HENT: Negative for ear pain and sore throat.    Eyes: Negative for pain and discharge.   Respiratory: Negative for cough and shortness of breath.    Cardiovascular: Negative for chest pain and palpitations.   Gastrointestinal: Negative for abdominal pain and nausea.   Endocrine: Negative for cold intolerance and heat intolerance.   Genitourinary: Positive for flank pain. Negative for difficulty urinating and dysuria.   Musculoskeletal: Negative for arthralgias and gait problem.   Skin: Negative for color change and rash.   Neurological: Negative for dizziness and weakness.   Psychiatric/Behavioral: Negative for agitation and confusion.        Objective    Temp:  [98.5 °F (36.9 °C)-100.1 °F (37.8 °C)] 100.1 °F (37.8 °C)  Heart Rate:  [] 95  Resp:  [18-20] 18  BP: (112-141)/(72-86) 112/72    Physical Exam  Constitutional:       Appearance: She is well-developed.   HENT:      Head: Normocephalic and atraumatic.   Eyes:      Pupils: Pupils are equal, round, and reactive to light.   Cardiovascular:      Rate and Rhythm: Normal rate and regular rhythm.   Pulmonary:       Effort: Pulmonary effort is normal.      Breath sounds: Normal breath sounds.   Abdominal:      General: Bowel sounds are normal.      Palpations: Abdomen is soft.   Musculoskeletal:         General: Normal range of motion.      Cervical back: Normal range of motion and neck supple.   Skin:     General: Skin is warm and dry.   Neurological:      Mental Status: She is alert and oriented to person, place, and time.   Psychiatric:         Behavior: Behavior normal.       Results Review:  I have reviewed the labs, radiology results, and diagnostic studies.    Laboratory Data:   Results from last 7 days   Lab Units 08/29/22  0628 08/27/22  0811 08/26/22  0336   SODIUM mmol/L 137 137 139   POTASSIUM mmol/L 3.8 3.4* 3.9   CHLORIDE mmol/L 103 105 108*   CO2 mmol/L 26.0 26.0 24.0   BUN mg/dL 11 14 16   CREATININE mg/dL 0.80 0.98 0.99   GLUCOSE mg/dL 108* 121* 106*   CALCIUM mg/dL 8.4* 8.6 8.5*   BILIRUBIN mg/dL 0.9  --  0.6   ALK PHOS U/L 110  --  72   ALT (SGPT) U/L 9  --  9   AST (SGOT) U/L 8  --  9   ANION GAP mmol/L 8.0 6.0 7.0     Estimated Creatinine Clearance: 125.8 mL/min (by C-G formula based on SCr of 0.8 mg/dL).  Results from last 7 days   Lab Units 08/27/22  0811   MAGNESIUM mg/dL 1.8         Results from last 7 days   Lab Units 08/29/22  0628 08/27/22  0811 08/26/22  0336   WBC 10*3/mm3 10.25 15.53* 17.83*   HEMOGLOBIN g/dL 8.0* 9.6* 10.6*   HEMATOCRIT % 24.1* 29.0* 31.3*   PLATELETS 10*3/mm3 193 171 192           Culture Data:   No results found for: BLOODCX  Urine Culture   Date Value Ref Range Status   08/28/2022 25,000 CFU/mL Gram Negative Bacilli (A)  Preliminary     No results found for: RESPCX  No results found for: WOUNDCX  No results found for: STOOLCX  No components found for: BODYFLD    Radiology Data:   Imaging Results (Last 24 Hours)     Procedure Component Value Units Date/Time    FL Retrograde Pyelogram In OR [[id number]] Resulted: 08/29/22 0700     Updated: 08/29/22 0700          I have reviewed  the patient's current medications.     Assessment/Plan     Active Hospital Problems    Diagnosis    • Pelvicaliectasis        Plan:    1.  Urosepsis: Continue IV fluids.  Urine and blood cultures pending.  Continue broad-spectrum antibiotics.    2.  Severe left-sided hydronephrosis/pelviectasis: Urology consult appreciated. Cystoscopy 8/28/22.  3.  Anemia, iron deficiency: Oral iron started.      DVT PPx:  SCDs.      Discharge Planning: I expect patient to be discharged to home in 1-2 days.    I confirmed that the patient's Advance Care Plan is present, code status is documented, or surrogate decision maker is listed in the patient's medical record.      I have utilized all available immediate resources to obtain, update, or review the patient's current medications.         This document has been electronically signed by KAISER Duarte on August 29, 2022 14:30 CDT

## 2022-08-29 NOTE — PLAN OF CARE
Goal Outcome Evaluation:  Plan of Care Reviewed With: patient           Outcome Evaluation: pt feels constipated, last BM 4 days ago, orders placed. will monitor.

## 2022-08-30 LAB
ALBUMIN SERPL-MCNC: 2.7 G/DL (ref 3.5–5.2)
ALBUMIN/GLOB SERPL: 0.8 G/DL
ALP SERPL-CCNC: 125 U/L (ref 39–117)
ALT SERPL W P-5'-P-CCNC: 9 U/L (ref 1–33)
ANION GAP SERPL CALCULATED.3IONS-SCNC: 8 MMOL/L (ref 5–15)
AST SERPL-CCNC: 8 U/L (ref 1–32)
BACTERIA SPEC AEROBE CULT: ABNORMAL
BASOPHILS # BLD AUTO: 0.02 10*3/MM3 (ref 0–0.2)
BASOPHILS NFR BLD AUTO: 0.3 % (ref 0–1.5)
BILIRUB SERPL-MCNC: 0.6 MG/DL (ref 0–1.2)
BUN SERPL-MCNC: 13 MG/DL (ref 6–20)
BUN/CREAT SERPL: 14.6 (ref 7–25)
CALCIUM SPEC-SCNC: 8.5 MG/DL (ref 8.6–10.5)
CHLORIDE SERPL-SCNC: 104 MMOL/L (ref 98–107)
CO2 SERPL-SCNC: 28 MMOL/L (ref 22–29)
CREAT SERPL-MCNC: 0.89 MG/DL (ref 0.57–1)
DEPRECATED RDW RBC AUTO: 44.1 FL (ref 37–54)
EGFRCR SERPLBLD CKD-EPI 2021: 86.3 ML/MIN/1.73
EOSINOPHIL # BLD AUTO: 0.18 10*3/MM3 (ref 0–0.4)
EOSINOPHIL NFR BLD AUTO: 2.3 % (ref 0.3–6.2)
ERYTHROCYTE [DISTWIDTH] IN BLOOD BY AUTOMATED COUNT: 14.2 % (ref 12.3–15.4)
GLOBULIN UR ELPH-MCNC: 3.6 GM/DL
GLUCOSE SERPL-MCNC: 110 MG/DL (ref 65–99)
HCT VFR BLD AUTO: 23.9 % (ref 34–46.6)
HGB BLD-MCNC: 7.9 G/DL (ref 12–15.9)
IMM GRANULOCYTES # BLD AUTO: 0.06 10*3/MM3 (ref 0–0.05)
IMM GRANULOCYTES NFR BLD AUTO: 0.8 % (ref 0–0.5)
LYMPHOCYTES # BLD AUTO: 1.09 10*3/MM3 (ref 0.7–3.1)
LYMPHOCYTES NFR BLD AUTO: 14.1 % (ref 19.6–45.3)
MCH RBC QN AUTO: 28 PG (ref 26.6–33)
MCHC RBC AUTO-ENTMCNC: 33.1 G/DL (ref 31.5–35.7)
MCV RBC AUTO: 84.8 FL (ref 79–97)
MONOCYTES # BLD AUTO: 0.9 10*3/MM3 (ref 0.1–0.9)
MONOCYTES NFR BLD AUTO: 11.6 % (ref 5–12)
NEUTROPHILS NFR BLD AUTO: 5.48 10*3/MM3 (ref 1.7–7)
NEUTROPHILS NFR BLD AUTO: 70.9 % (ref 42.7–76)
NRBC BLD AUTO-RTO: 0 /100 WBC (ref 0–0.2)
PLATELET # BLD AUTO: 190 10*3/MM3 (ref 140–450)
PMV BLD AUTO: 10.9 FL (ref 6–12)
POTASSIUM SERPL-SCNC: 3.7 MMOL/L (ref 3.5–5.2)
PROT SERPL-MCNC: 6.3 G/DL (ref 6–8.5)
RBC # BLD AUTO: 2.82 10*6/MM3 (ref 3.77–5.28)
SODIUM SERPL-SCNC: 140 MMOL/L (ref 136–145)
WBC NRBC COR # BLD: 7.73 10*3/MM3 (ref 3.4–10.8)

## 2022-08-30 PROCEDURE — 80053 COMPREHEN METABOLIC PANEL: CPT | Performed by: NURSE PRACTITIONER

## 2022-08-30 PROCEDURE — 25010000002 HYDROMORPHONE 1 MG/ML SOLUTION: Performed by: NURSE PRACTITIONER

## 2022-08-30 PROCEDURE — 25010000002 ONDANSETRON PER 1 MG: Performed by: UROLOGY

## 2022-08-30 PROCEDURE — 85025 COMPLETE CBC W/AUTO DIFF WBC: CPT | Performed by: NURSE PRACTITIONER

## 2022-08-30 PROCEDURE — 25010000002 PIPERACILLIN SOD-TAZOBACTAM PER 1 G: Performed by: UROLOGY

## 2022-08-30 PROCEDURE — 25010000002 CEFTRIAXONE PER 250 MG: Performed by: NURSE PRACTITIONER

## 2022-08-30 RX ORDER — BISACODYL 10 MG
10 SUPPOSITORY, RECTAL RECTAL DAILY
Status: DISCONTINUED | OUTPATIENT
Start: 2022-08-30 | End: 2022-08-31 | Stop reason: HOSPADM

## 2022-08-30 RX ADMIN — FERROUS SULFATE TAB EC 324 MG (65 MG FE EQUIVALENT) 324 MG: 324 (65 FE) TABLET DELAYED RESPONSE at 08:36

## 2022-08-30 RX ADMIN — HYDROMORPHONE HYDROCHLORIDE 0.25 MG: 1 INJECTION, SOLUTION INTRAMUSCULAR; INTRAVENOUS; SUBCUTANEOUS at 12:49

## 2022-08-30 RX ADMIN — CEFTRIAXONE SODIUM 1 G: 1 INJECTION, POWDER, FOR SOLUTION INTRAMUSCULAR; INTRAVENOUS at 17:11

## 2022-08-30 RX ADMIN — FAMOTIDINE 20 MG: 20 TABLET ORAL at 17:06

## 2022-08-30 RX ADMIN — ONDANSETRON 4 MG: 2 INJECTION INTRAMUSCULAR; INTRAVENOUS at 06:34

## 2022-08-30 RX ADMIN — Medication 10 ML: at 02:01

## 2022-08-30 RX ADMIN — Medication 10 ML: at 08:36

## 2022-08-30 RX ADMIN — PIPERACILLIN AND TAZOBACTAM 4.5 G: 4; .5 INJECTION, POWDER, LYOPHILIZED, FOR SOLUTION INTRAVENOUS; PARENTERAL at 02:01

## 2022-08-30 RX ADMIN — ACETAMINOPHEN 650 MG: 325 TABLET, FILM COATED ORAL at 16:29

## 2022-08-30 RX ADMIN — HYDROMORPHONE HYDROCHLORIDE 0.25 MG: 1 INJECTION, SOLUTION INTRAMUSCULAR; INTRAVENOUS; SUBCUTANEOUS at 06:33

## 2022-08-30 RX ADMIN — OXYCODONE HYDROCHLORIDE AND ACETAMINOPHEN 1 TABLET: 5; 325 TABLET ORAL at 17:11

## 2022-08-30 RX ADMIN — SODIUM CHLORIDE, POTASSIUM CHLORIDE, SODIUM LACTATE AND CALCIUM CHLORIDE 75 ML/HR: 600; 310; 30; 20 INJECTION, SOLUTION INTRAVENOUS at 08:37

## 2022-08-30 RX ADMIN — POLYETHYLENE GLYCOL 3350 17 G: 17 POWDER, FOR SOLUTION ORAL at 08:36

## 2022-08-30 RX ADMIN — BISACODYL 10 MG: 5 TABLET, COATED ORAL at 11:00

## 2022-08-30 RX ADMIN — BISACODYL 10 MG: 10 SUPPOSITORY RECTAL at 15:36

## 2022-08-30 RX ADMIN — ACETAMINOPHEN 650 MG: 325 TABLET, FILM COATED ORAL at 21:41

## 2022-08-30 RX ADMIN — ONDANSETRON 4 MG: 2 INJECTION INTRAMUSCULAR; INTRAVENOUS at 00:00

## 2022-08-30 RX ADMIN — HYDROMORPHONE HYDROCHLORIDE 0.25 MG: 1 INJECTION, SOLUTION INTRAMUSCULAR; INTRAVENOUS; SUBCUTANEOUS at 20:13

## 2022-08-30 RX ADMIN — PIPERACILLIN AND TAZOBACTAM 4.5 G: 4; .5 INJECTION, POWDER, LYOPHILIZED, FOR SOLUTION INTRAVENOUS; PARENTERAL at 10:25

## 2022-08-30 RX ADMIN — DOCUSATE SODIUM 100 MG: 100 CAPSULE, LIQUID FILLED ORAL at 08:36

## 2022-08-30 RX ADMIN — DOCUSATE SODIUM 100 MG: 100 CAPSULE, LIQUID FILLED ORAL at 20:13

## 2022-08-30 RX ADMIN — Medication 10 ML: at 20:14

## 2022-08-30 RX ADMIN — FAMOTIDINE 20 MG: 20 TABLET ORAL at 06:34

## 2022-08-30 NOTE — PLAN OF CARE
Goal Outcome Evaluation:  Pt complained of pain of abdomen several times today, pain medication per chart was given.  Pt appears to be anxious as well.

## 2022-08-30 NOTE — PROGRESS NOTES
[facility] Medicine Services  INPATIENT PROGRESS NOTE    Length of Stay: 3  Date of Admission: 8/26/2022  Primary Care Physician: Provider, No Known    Subjective   Chief Complaint: Left flank pain.     HPI: This is a 37-year-old female with past medical history of bipolar disorder, thyroid disease and tobacco abuse that presented to [facility] on 8/26/2022 with complaints of left flank pain.  CT of the abdomen and pelvis indicated severe left-sided hydronephrosis including severe pelviectasis with no significant ureterectasis.      The patient was admitted for urosepsis with acute left pyelonephritis.  Urology was consulted and the patient was started on IV Zosyn, antiemetics and pain control.  Cystoscopy was done by Dr. Abraham on 8/28/2022.    Review of Systems   Constitutional: Negative for activity change and fatigue.   HENT: Negative for ear pain and sore throat.    Eyes: Negative for pain and discharge.   Respiratory: Negative for cough and shortness of breath.    Cardiovascular: Negative for chest pain and palpitations.   Gastrointestinal: Negative for abdominal pain and nausea.   Endocrine: Negative for cold intolerance and heat intolerance.   Genitourinary: Positive for flank pain. Negative for difficulty urinating and dysuria.   Musculoskeletal: Negative for arthralgias and gait problem.   Skin: Negative for color change and rash.   Neurological: Negative for dizziness and weakness.   Psychiatric/Behavioral: Negative for agitation and confusion.        Objective    Temp:  [96.7 °F (35.9 °C)-100.8 °F (38.2 °C)] 97.1 °F (36.2 °C)  Heart Rate:  [80-97] 80  Resp:  [18] 18  BP: (112-132)/(71-85) 122/71    Physical Exam  Constitutional:       Appearance: She is well-developed.   HENT:      Head: Normocephalic and atraumatic.   Eyes:      Pupils: Pupils are equal, round, and reactive to light.   Cardiovascular:      Rate and Rhythm: Normal rate and regular rhythm.   Pulmonary:       Effort: Pulmonary effort is normal.      Breath sounds: Normal breath sounds.   Abdominal:      General: Bowel sounds are normal.      Palpations: Abdomen is soft.   Musculoskeletal:         General: Normal range of motion.      Cervical back: Normal range of motion and neck supple.   Skin:     General: Skin is warm and dry.   Neurological:      Mental Status: She is alert and oriented to person, place, and time.   Psychiatric:         Behavior: Behavior normal.       Results Review:  I have reviewed the labs, radiology results, and diagnostic studies.    Laboratory Data:   Results from last 7 days   Lab Units 08/30/22  0706 08/29/22  0628 08/27/22  0811 08/26/22  0336   SODIUM mmol/L 140 137 137 139   POTASSIUM mmol/L 3.7 3.8 3.4* 3.9   CHLORIDE mmol/L 104 103 105 108*   CO2 mmol/L 28.0 26.0 26.0 24.0   BUN mg/dL 13 11 14 16   CREATININE mg/dL 0.89 0.80 0.98 0.99   GLUCOSE mg/dL 110* 108* 121* 106*   CALCIUM mg/dL 8.5* 8.4* 8.6 8.5*   BILIRUBIN mg/dL 0.6 0.9  --  0.6   ALK PHOS U/L 125* 110  --  72   ALT (SGPT) U/L 9 9  --  9   AST (SGOT) U/L 8 8  --  9   ANION GAP mmol/L 8.0 8.0 6.0 7.0     Estimated Creatinine Clearance: 113.1 mL/min (by C-G formula based on SCr of 0.89 mg/dL).  Results from last 7 days   Lab Units 08/27/22  0811   MAGNESIUM mg/dL 1.8         Results from last 7 days   Lab Units 08/30/22  0706 08/29/22  0628 08/27/22  0811 08/26/22  0336   WBC 10*3/mm3 7.73 10.25 15.53* 17.83*   HEMOGLOBIN g/dL 7.9* 8.0* 9.6* 10.6*   HEMATOCRIT % 23.9* 24.1* 29.0* 31.3*   PLATELETS 10*3/mm3 190 193 171 192           Culture Data:   No results found for: BLOODCX  Urine Culture   Date Value Ref Range Status   08/28/2022 25,000 CFU/mL Escherichia coli (A)  Final     No results found for: RESPCX  No results found for: WOUNDCX  No results found for: STOOLCX  No components found for: BODYFLD    Radiology Data:   Imaging Results (Last 24 Hours)     ** No results found for the last 24 hours. **          I have reviewed  the patient's current medications.     Assessment/Plan     Active Hospital Problems    Diagnosis    • Pelvicaliectasis        Plan:    1.  Urosepsis: Continue IV fluids.  Urine culture shows E. Coli resistant to Levaquin. Will d/c Zosyn and start Rocephin.  Blood culture negative at 4 days.     2.  Severe left-sided hydronephrosis/pelviectasis: Urology consult appreciated. Cystoscopy 8/28/22.    3.  Anemia, iron deficiency: Oral iron started.    4.  Constipation:  Colace, Miralax and Dulcolax.     DVT PPx:  SCDs.      Discharge Planning: I expect patient to be discharged to home in 1-2 days.    I confirmed that the patient's Advance Care Plan is present, code status is documented, or surrogate decision maker is listed in the patient's medical record.      I have utilized all available immediate resources to obtain, update, or review the patient's current medications.         This document has been electronically signed by KAISER Duarte on August 30, 2022 15:48 CDT

## 2022-08-30 NOTE — PAYOR COMM NOTE
"    Zari Lucero RN [facility]  [phone]       Phone  [phone]        Fax  Cont. Stay Review      Mackenzie RIVAS (37 y.o. Female)             Date of Birth   1985    Social Security Number       Address   [address] [place] [address]    Home Phone   [phone]    MRN   [id number]       Anglican   None    Marital Status   Single                            Admission Date   8/26/22    Admission Type   Emergency    Admitting Provider   Behroozi, Saeid, MD    Attending Provider   Shawn Montemayor MD    Department, Room/Bed   [address], 429/1       Discharge Date       Discharge Disposition       Discharge Destination                               Attending Provider: Shawn Montemayor MD    Allergies: No Known Allergies    Isolation: None   Infection: None   Code Status: CPR   Advance Care Planning Activity    Ht: 167.6 cm (66\")   Wt: 116 kg (255 lb 6.4 oz)    Admission Cmt: None   Principal Problem: None                Active Insurance as of 8/26/2022     Primary Coverage     Payor Plan Insurance Group Employer/Plan Group    [employer] [place] [employer] [place]ICA[place]      Payor Plan Address Payor Plan Phone Number Payor Plan Fax Number Effective Dates    [address] [address] [phone]  12/24/2020 - None Entered    [facility] [address]       Subscriber Name Subscriber Birth Date Member ID       MACKENZIE RIVAS 1985 [id number]                 Emergency Contacts      (Rel.) Home Phone Work Phone Mobile Phone    KATY GLASER (Significant Other) [phone] -- --            Vital Signs (last day)     Date/Time Temp Temp src Pulse Resp BP Patient Position SpO2    08/30/22 0700 97.1 (36.2) Oral 88 18 132/85 Lying 99    08/30/22 0333 96.7 (35.9) Oral 90 18 112/75 Lying 98    08/29/22 2100 97.7 (36.5) -- -- -- -- -- --    08/29/22 1927 100.8 (38.2) Oral 97 18 121/76 Sitting 99    08/29/22 1500 99.4 (37.4) " Oral 93 18 117/62 Lying 100    08/29/22 0929 100.1 (37.8) Oral 95 18 112/72 Lying 97    08/29/22 0342 98.5 (36.9) Oral 100 20 117/82 Sitting 97          Oxygen Therapy (last day)     Date/Time SpO2 Device (Oxygen Therapy) Flow (L/min) Oxygen Concentration (%) ETCO2 (mmHg)    08/30/22 0700 99 room air -- -- --    08/30/22 0333 98 room air -- -- --    08/29/22 2000 -- room air -- -- --    08/29/22 1927 99 room air -- -- --    08/29/22 1500 100 room air -- -- --    08/29/22 0929 97 room air -- -- --    08/29/22 0342 97 room air -- -- --          Current Facility-Administered Medications   Medication Dose Route Frequency Provider Last Rate Last Admin   • acetaminophen (TYLENOL) tablet 650 mg  650 mg Oral Q6H PRN Popeye Abraham MD   650 mg at 08/29/22 1939   • bisacodyl (DULCOLAX) EC tablet 10 mg  10 mg Oral Daily PRN Levsophie, Amy G, APRN   10 mg at 08/29/22 1045   • docusate sodium (COLACE) capsule 100 mg  100 mg Oral BID Popeye Abraham MD   100 mg at 08/30/22 0836   • famotidine (PEPCID) tablet 20 mg  20 mg Oral BID AC Popeye Abraham MD   20 mg at 08/30/22 0634   • ferrous sulfate EC tablet 324 mg  324 mg Oral Daily With Breakfast Popeye Abraham MD   324 mg at 08/30/22 0836   • HYDROmorphone (DILAUDID) injection 0.25 mg  0.25 mg Intravenous Q4H PRN Levill, Amy G, APRN   0.25 mg at 08/30/22 0633   • ketorolac (TORADOL) tablet 10 mg  10 mg Oral Q6H PRN Levill, Amy G, APRN   10 mg at 08/29/22 1837   • lactated ringers infusion  75 mL/hr Intravenous Continuous Popeye Abraham MD 75 mL/hr at 08/30/22 0837 75 mL/hr at 08/30/22 0837   • ondansetron (ZOFRAN) injection 4 mg  4 mg Intravenous Q6H PRN Popeye Abraham MD   4 mg at 08/30/22 0634   • oxyCODONE-acetaminophen (PERCOCET) 5-325 MG per tablet 1 tablet  1 tablet Oral Q6H PRN Amy Killian, APRN   1 tablet at 08/29/22 1631   • piperacillin-tazobactam (ZOSYN) 4.5 g/100 mL 0.9% NS IVPB (mbp)  4.5 g Intravenous Q8H Popeye Abraham MD   4.5 g at  08/30/22 0201   • polyethylene glycol (MIRALAX) packet 17 g  17 g Oral Daily Amy Killian APRN   17 g at 08/30/22 0836   • sodium chloride 0.9 % flush 10 mL  10 mL Intravenous Q12H Popeye Abraham MD   10 mL at 08/30/22 0836   • sodium chloride 0.9 % flush 10 mL  10 mL Intravenous PRN Popeye Abraham MD   10 mL at 08/30/22 0201        Physician Progress Notes (last 48 hours)      Aym Killian APRN at 08/29/22 1430     Attestation signed by Robbie Hester MD at 08/29/22 1435    I personally evaluated and examined the patient in conjunction with [facility] Murphy and agree with the assessment, treatment plan, and disposition of the patient as recorded.  Cardiovascular: Normal S1 and S2.  Lungs: Good air entry bilaterally.                    [facility] Medicine Services  INPATIENT PROGRESS NOTE    Length of Stay: 2  Date of Admission: 8/26/2022  Primary Care Physician: Provider, No Known    Subjective   Chief Complaint: Left flank pain.     HPI: This is a 37-year-old female with past medical history of bipolar disorder, thyroid disease and tobacco abuse that presented to [facility] on 8/26/2022 with complaints of left flank pain.  CT of the abdomen and pelvis indicated severe left-sided hydronephrosis including severe pelviectasis with no significant ureterectasis.      The patient was admitted for urosepsis with acute left pyelonephritis.  Urology was consulted and the patient was started on IV Zosyn, antiemetics and pain control.  Cystoscopy was done by Dr. Abraham on 8/28/2022.    Review of Systems   Constitutional: Negative for activity change and fatigue.   HENT: Negative for ear pain and sore throat.    Eyes: Negative for pain and discharge.   Respiratory: Negative for cough and shortness of breath.    Cardiovascular: Negative for chest pain and palpitations.   Gastrointestinal: Negative for abdominal pain and nausea.   Endocrine: Negative for cold intolerance and heat  intolerance.   Genitourinary: Positive for flank pain. Negative for difficulty urinating and dysuria.   Musculoskeletal: Negative for arthralgias and gait problem.   Skin: Negative for color change and rash.   Neurological: Negative for dizziness and weakness.   Psychiatric/Behavioral: Negative for agitation and confusion.        Objective    Temp:  [98.5 °F (36.9 °C)-100.1 °F (37.8 °C)] 100.1 °F (37.8 °C)  Heart Rate:  [] 95  Resp:  [18-20] 18  BP: (112-141)/(72-86) 112/72    Physical Exam  Constitutional:       Appearance: She is well-developed.   HENT:      Head: Normocephalic and atraumatic.   Eyes:      Pupils: Pupils are equal, round, and reactive to light.   Cardiovascular:      Rate and Rhythm: Normal rate and regular rhythm.   Pulmonary:      Effort: Pulmonary effort is normal.      Breath sounds: Normal breath sounds.   Abdominal:      General: Bowel sounds are normal.      Palpations: Abdomen is soft.   Musculoskeletal:         General: Normal range of motion.      Cervical back: Normal range of motion and neck supple.   Skin:     General: Skin is warm and dry.   Neurological:      Mental Status: She is alert and oriented to person, place, and time.   Psychiatric:         Behavior: Behavior normal.       Results Review:  I have reviewed the labs, radiology results, and diagnostic studies.    Laboratory Data:   Results from last 7 days   Lab Units 08/29/22  0628 08/27/22  0811 08/26/22  0336   SODIUM mmol/L 137 137 139   POTASSIUM mmol/L 3.8 3.4* 3.9   CHLORIDE mmol/L 103 105 108*   CO2 mmol/L 26.0 26.0 24.0   BUN mg/dL 11 14 16   CREATININE mg/dL 0.80 0.98 0.99   GLUCOSE mg/dL 108* 121* 106*   CALCIUM mg/dL 8.4* 8.6 8.5*   BILIRUBIN mg/dL 0.9  --  0.6   ALK PHOS U/L 110  --  72   ALT (SGPT) U/L 9  --  9   AST (SGOT) U/L 8  --  9   ANION GAP mmol/L 8.0 6.0 7.0     Estimated Creatinine Clearance: 125.8 mL/min (by C-G formula based on SCr of 0.8 mg/dL).  Results from last 7 days   Lab Units  08/27/22  0811   MAGNESIUM mg/dL 1.8         Results from last 7 days   Lab Units 08/29/22  0628 08/27/22  0811 08/26/22  0336   WBC 10*3/mm3 10.25 15.53* 17.83*   HEMOGLOBIN g/dL 8.0* 9.6* 10.6*   HEMATOCRIT % 24.1* 29.0* 31.3*   PLATELETS 10*3/mm3 193 171 192           Culture Data:   No results found for: BLOODCX  Urine Culture   Date Value Ref Range Status   08/28/2022 25,000 CFU/mL Gram Negative Bacilli (A)  Preliminary     No results found for: RESPCX  No results found for: WOUNDCX  No results found for: STOOLCX  No components found for: BODYFLD    Radiology Data:   Imaging Results (Last 24 Hours)     Procedure Component Value Units Date/Time    FL Retrograde Pyelogram In OR [[id number]] Resulted: 08/29/22 0700     Updated: 08/29/22 0700          I have reviewed the patient's current medications.     Assessment/Plan     Active Hospital Problems    Diagnosis    • Pelvicaliectasis        Plan:    1.  Urosepsis: Continue IV fluids.  Urine and blood cultures pending.  Continue broad-spectrum antibiotics.    2.  Severe left-sided hydronephrosis/pelviectasis: Urology consult appreciated. Cystoscopy 8/28/22.  3.  Anemia, iron deficiency: Oral iron started.      DVT PPx:  SCDs.      Discharge Planning: I expect patient to be discharged to home in 1-2 days.    I confirmed that the patient's Advance Care Plan is present, code status is documented, or surrogate decision maker is listed in the patient's medical record.      I have utilized all available immediate resources to obtain, update, or review the patient's current medications.         This document has been electronically signed by [facility] Duarte on August 29, 2022 14:30 CDT          Electronically signed by Robbie Hester MD at 08/29/22 6745     Gregory Steen APRN at 08/29/22 0930     Attestation signed by Popeye Abraham MD at 08/29/22 9196    I have reviewed this documentation and agree.                     LOS: 2 days   Patient Care  "Team:  Provider, No Known as PCP - General  Fredy Johns MD (Family Medicine)  Wellington Selby MD as Resident (Family Medicine)  Robbie Garcia MD as Resident (Family Medicine)  Steve Shay MD as Resident (Family Medicine)  Emily Reid MD as Resident (Family Medicine)    Subjective     Subjective:  Symptoms:  Stable.  She reports malaise.  (Hematuria clearing).    Diet:  No vomiting.    Pain:  Pain is requiring pain medication.        History taken from: patient chart    Objective     Vital Signs  Temp:  [98.5 °F (36.9 °C)-100.1 °F (37.8 °C)] 99.4 °F (37.4 °C)  Heart Rate:  [] 93  Resp:  [18-20] 18  BP: (112-141)/(62-86) 117/62    Objective:  General Appearance:  In no acute distress.    Vital signs: (most recent): Blood pressure 117/62, pulse 93, temperature 99.4 °F (37.4 °C), temperature source Oral, resp. rate 18, height 167.6 cm (66\"), weight 116 kg (255 lb 6.4 oz), last menstrual period 08/08/2022, SpO2 100 %, not currently breastfeeding.  Fever.  (TMAX 100.3-->100.1).    Output: Producing urine.    Lungs:  Normal effort.    Neurological: Patient is alert and oriented to person, place and time.    Pupils:  Pupils are equal, round, and reactive to light.    Skin:  No rash, ecchymosis or cyanosis.             Results Review:    Lab Results (last 24 hours)     Procedure Component Value Units Date/Time    Urine Culture - Urine, Kidney, Left [[id number]]  (Abnormal) Collected: 08/28/22 0750    Specimen: Urine from Kidney, Left Updated: 08/29/22 1157     Urine Culture 25,000 CFU/mL Gram Negative Bacilli    Narrative:      Colonization of the urinary tract without infection is common. Treatment is discouraged unless the patient is symptomatic, pregnant, or undergoing an invasive urologic procedure.    Manual Differential [[id number]]  (Abnormal) Collected: 08/29/22 0628    Specimen: Blood Updated: 08/29/22 0740     Neutrophil % 83.0 %      Lymphocyte % 11.0 %      Monocyte % 3.0 %      " Eosinophil % 2.0 %      Bands %  1.0 %      Neutrophils Absolute 8.61 10*3/mm3      Lymphocytes Absolute 1.13 10*3/mm3      Monocytes Absolute 0.31 10*3/mm3      Eosinophils Absolute 0.21 10*3/mm3      Anisocytosis Slight/1+     Hypochromia Slight/1+     WBC Morphology Normal     Platelet Estimate Adequate    Blood Culture - Blood, Arm, Right [[id number]]  (Normal) Collected: 08/26/22 0658    Specimen: Blood from Arm, Right Updated: 08/29/22 0716     Blood Culture No growth at 3 days    Comprehensive Metabolic Panel [[id number]]  (Abnormal) Collected: 08/29/22 0628    Specimen: Blood Updated: 08/29/22 0708     Glucose 108 mg/dL      BUN 11 mg/dL      Creatinine 0.80 mg/dL      Sodium 137 mmol/L      Potassium 3.8 mmol/L      Chloride 103 mmol/L      CO2 26.0 mmol/L      Calcium 8.4 mg/dL      Total Protein 6.2 g/dL      Albumin 3.10 g/dL      ALT (SGPT) 9 U/L      AST (SGOT) 8 U/L      Alkaline Phosphatase 110 U/L      Total Bilirubin 0.9 mg/dL      Globulin 3.1 gm/dL      A/G Ratio 1.0 g/dL      BUN/Creatinine Ratio 13.8     Anion Gap 8.0 mmol/L      eGFR 98.1 mL/min/1.73      Comment: National Kidney Foundation and American Society of Nephrology (ASN) Task Force recommended calculation based on the Chronic Kidney Disease Epidemiology Collaboration (CKD-EPI) equation refit without adjustment for race.       Narrative:      GFR Normal >60  Chronic Kidney Disease <60  Kidney Failure <15      CBC & Differential [[id number]]  (Abnormal) Collected: 08/29/22 0628    Specimen: Blood Updated: 08/29/22 0652    Narrative:      The following orders were created for panel order CBC & Differential.  Procedure                               Abnormality         Status                     ---------                               -----------         ------                     CBC Auto Differential[[id number]]        Abnormal            Final result               Scan Slide[[id number]]                                                                     Please view results for these tests on the individual orders.    CBC Auto Differential [[id number]]  (Abnormal) Collected: 08/29/22 0628    Specimen: Blood Updated: 08/29/22 0652     WBC 10.25 10*3/mm3      RBC 2.84 10*6/mm3      Hemoglobin 8.0 g/dL      Hematocrit 24.1 %      MCV 84.9 fL      MCH 28.2 pg      MCHC 33.2 g/dL      RDW 14.3 %      RDW-SD 44.3 fl      MPV 11.0 fL      Platelets 193 10*3/mm3     Blood Culture - Blood, Arm, Left [[id number]]  (Normal) Collected: 08/26/22 0435    Specimen: Blood from Arm, Left Updated: 08/29/22 0447     Blood Culture No growth at 3 days         Imaging Results (Last 24 Hours)     Procedure Component Value Units Date/Time    FL Retrograde Pyelogram In OR [[id number]] Resulted: 08/29/22 0700     Updated: 08/29/22 0700           I reviewed the patient's new clinical results.  I reviewed the patient's new imaging results and agree with the interpretation.  I reviewed the patient's other test results and agree with the interpretation      Assessment & Plan       Pelvicaliectasis      Assessment & Plan    POD 1 cystoscopy left retrograde and stent placement, findings: hydronephrotic left kidney, purulent drainage from left kidney sent for culture, gram negative bacilli. Hematuria clearing.     Mackenzie RIVAS is a 37 y.o. female who has severe left hydronephrosis with pyelonephritis,   Estimated Creatinine Clearance: 125.8 mL/min (by C-G formula based on SCr of 0.8 mg/dL).     Plan:  Await culture, continue antibiotic.     [facility] Judd  08/29/22  16:54 CDT        Electronically signed by [place], Popeye MENDOZA MD at 08/29/22 2044       Medical Student Notes (last 48 hours)  Notes from 08/28/22 0951 through 08/30/22 0951   No notes of this type exist for this encounter.       Consult Notes (last 48 hours)  Notes from 08/28/22 0951 through 08/30/22 0951   No notes of this type exist for this encounter.

## 2022-08-30 NOTE — PLAN OF CARE
Goal Outcome Evaluation:  Plan of Care Reviewed With: patient        Progress: improving    Sat up in chair until late.Alert and oriented.With c/o severe pain and nausea x 1 this shift. Rested well.

## 2022-08-31 VITALS
SYSTOLIC BLOOD PRESSURE: 124 MMHG | DIASTOLIC BLOOD PRESSURE: 78 MMHG | TEMPERATURE: 98.5 F | BODY MASS INDEX: 42.02 KG/M2 | HEIGHT: 66 IN | HEART RATE: 80 BPM | OXYGEN SATURATION: 93 % | RESPIRATION RATE: 18 BRPM | WEIGHT: 261.44 LBS

## 2022-08-31 LAB
ALBUMIN SERPL-MCNC: 3.2 G/DL (ref 3.5–5.2)
ALBUMIN/GLOB SERPL: 1 G/DL
ALP SERPL-CCNC: 159 U/L (ref 39–117)
ALT SERPL W P-5'-P-CCNC: 12 U/L (ref 1–33)
ANION GAP SERPL CALCULATED.3IONS-SCNC: 9 MMOL/L (ref 5–15)
AST SERPL-CCNC: 11 U/L (ref 1–32)
BACTERIA SPEC AEROBE CULT: NORMAL
BACTERIA SPEC AEROBE CULT: NORMAL
BASOPHILS # BLD AUTO: 0.03 10*3/MM3 (ref 0–0.2)
BASOPHILS NFR BLD AUTO: 0.3 % (ref 0–1.5)
BILIRUB SERPL-MCNC: 0.4 MG/DL (ref 0–1.2)
BUN SERPL-MCNC: 11 MG/DL (ref 6–20)
BUN/CREAT SERPL: 14.1 (ref 7–25)
CALCIUM SPEC-SCNC: 8.7 MG/DL (ref 8.6–10.5)
CHLORIDE SERPL-SCNC: 101 MMOL/L (ref 98–107)
CO2 SERPL-SCNC: 28 MMOL/L (ref 22–29)
CREAT SERPL-MCNC: 0.78 MG/DL (ref 0.57–1)
DEPRECATED RDW RBC AUTO: 45.2 FL (ref 37–54)
EGFRCR SERPLBLD CKD-EPI 2021: 101.1 ML/MIN/1.73
EOSINOPHIL # BLD AUTO: 0.2 10*3/MM3 (ref 0–0.4)
EOSINOPHIL NFR BLD AUTO: 2.1 % (ref 0.3–6.2)
ERYTHROCYTE [DISTWIDTH] IN BLOOD BY AUTOMATED COUNT: 14.1 % (ref 12.3–15.4)
GLOBULIN UR ELPH-MCNC: 3.3 GM/DL
GLUCOSE SERPL-MCNC: 122 MG/DL (ref 65–99)
HCT VFR BLD AUTO: 28.2 % (ref 34–46.6)
HGB BLD-MCNC: 9.1 G/DL (ref 12–15.9)
IMM GRANULOCYTES # BLD AUTO: 0.1 10*3/MM3 (ref 0–0.05)
IMM GRANULOCYTES NFR BLD AUTO: 1.1 % (ref 0–0.5)
LYMPHOCYTES # BLD AUTO: 1.2 10*3/MM3 (ref 0.7–3.1)
LYMPHOCYTES NFR BLD AUTO: 12.8 % (ref 19.6–45.3)
MCH RBC QN AUTO: 27.9 PG (ref 26.6–33)
MCHC RBC AUTO-ENTMCNC: 32.3 G/DL (ref 31.5–35.7)
MCV RBC AUTO: 86.5 FL (ref 79–97)
MONOCYTES # BLD AUTO: 0.78 10*3/MM3 (ref 0.1–0.9)
MONOCYTES NFR BLD AUTO: 8.3 % (ref 5–12)
NEUTROPHILS NFR BLD AUTO: 7.06 10*3/MM3 (ref 1.7–7)
NEUTROPHILS NFR BLD AUTO: 75.4 % (ref 42.7–76)
NRBC BLD AUTO-RTO: 0 /100 WBC (ref 0–0.2)
PLATELET # BLD AUTO: 261 10*3/MM3 (ref 140–450)
PMV BLD AUTO: 11.1 FL (ref 6–12)
POTASSIUM SERPL-SCNC: 4 MMOL/L (ref 3.5–5.2)
PROT SERPL-MCNC: 6.5 G/DL (ref 6–8.5)
RBC # BLD AUTO: 3.26 10*6/MM3 (ref 3.77–5.28)
SODIUM SERPL-SCNC: 138 MMOL/L (ref 136–145)
WBC NRBC COR # BLD: 9.37 10*3/MM3 (ref 3.4–10.8)

## 2022-08-31 PROCEDURE — 85025 COMPLETE CBC W/AUTO DIFF WBC: CPT | Performed by: NURSE PRACTITIONER

## 2022-08-31 PROCEDURE — 25010000002 HYDROMORPHONE 1 MG/ML SOLUTION: Performed by: NURSE PRACTITIONER

## 2022-08-31 PROCEDURE — 25010000002 ONDANSETRON PER 1 MG: Performed by: UROLOGY

## 2022-08-31 PROCEDURE — 80053 COMPREHEN METABOLIC PANEL: CPT | Performed by: NURSE PRACTITIONER

## 2022-08-31 RX ORDER — CEFDINIR 300 MG/1
300 CAPSULE ORAL 2 TIMES DAILY
Qty: 10 CAPSULE | Refills: 0 | Status: SHIPPED | OUTPATIENT
Start: 2022-08-31 | End: 2022-09-05

## 2022-08-31 RX ORDER — POLYETHYLENE GLYCOL 3350 17 G/17G
17 POWDER, FOR SOLUTION ORAL DAILY
Qty: 30 EACH | Refills: 0 | Status: SHIPPED | OUTPATIENT
Start: 2022-09-01 | End: 2022-10-25

## 2022-08-31 RX ORDER — OXYCODONE HYDROCHLORIDE AND ACETAMINOPHEN 5; 325 MG/1; MG/1
1 TABLET ORAL EVERY 6 HOURS PRN
Qty: 12 TABLET | Refills: 0 | Status: SHIPPED | OUTPATIENT
Start: 2022-08-31 | End: 2022-09-03

## 2022-08-31 RX ORDER — FERROUS SULFATE TAB EC 324 MG (65 MG FE EQUIVALENT) 324 (65 FE) MG
324 TABLET DELAYED RESPONSE ORAL
Qty: 30 TABLET | Refills: 3 | Status: SHIPPED | OUTPATIENT
Start: 2022-09-01 | End: 2022-10-25 | Stop reason: SDUPTHER

## 2022-08-31 RX ORDER — KETOROLAC TROMETHAMINE 10 MG/1
10 TABLET, FILM COATED ORAL EVERY 6 HOURS PRN
Qty: 30 TABLET | Refills: 0 | Status: SHIPPED | OUTPATIENT
Start: 2022-08-31

## 2022-08-31 RX ADMIN — POLYETHYLENE GLYCOL 3350 17 G: 17 POWDER, FOR SOLUTION ORAL at 08:42

## 2022-08-31 RX ADMIN — SODIUM CHLORIDE, POTASSIUM CHLORIDE, SODIUM LACTATE AND CALCIUM CHLORIDE 75 ML/HR: 600; 310; 30; 20 INJECTION, SOLUTION INTRAVENOUS at 01:00

## 2022-08-31 RX ADMIN — ONDANSETRON 4 MG: 2 INJECTION INTRAMUSCULAR; INTRAVENOUS at 04:55

## 2022-08-31 RX ADMIN — DOCUSATE SODIUM 100 MG: 100 CAPSULE, LIQUID FILLED ORAL at 08:42

## 2022-08-31 RX ADMIN — ACETAMINOPHEN 650 MG: 325 TABLET, FILM COATED ORAL at 08:42

## 2022-08-31 RX ADMIN — HYDROMORPHONE HYDROCHLORIDE 0.25 MG: 1 INJECTION, SOLUTION INTRAMUSCULAR; INTRAVENOUS; SUBCUTANEOUS at 06:49

## 2022-08-31 RX ADMIN — FERROUS SULFATE TAB EC 324 MG (65 MG FE EQUIVALENT) 324 MG: 324 (65 FE) TABLET DELAYED RESPONSE at 08:42

## 2022-08-31 RX ADMIN — Medication 10 ML: at 08:43

## 2022-08-31 RX ADMIN — FAMOTIDINE 20 MG: 20 TABLET ORAL at 08:42

## 2022-08-31 NOTE — DISCHARGE SUMMARY
[facility] Medicine Services  DISCHARGE SUMMARY       Date of Admission: 8/26/2022  Date of Discharge:  8/31/2022  Primary Care Physician: Provider, No Known    Presenting Problem/History of Present Illness:  Pelvicaliectasis [N28.89]  Acute cystitis without hematuria [N30.00]     Final Discharge Diagnoses:  Active Hospital Problems    Diagnosis    • Pelvicaliectasis        Consults:   Consults     Date and Time Order Name Status Description    8/26/2022  7:27 AM Inpatient Urology Consult Completed     8/26/2022  6:10 AM Hospitalist (on-call MD unless specified)            Procedures Performed: Procedure(s):  CYSTOSCOPY LEFT RETROGRADE PYELOGRAM AND STENT INSERTION              Pertinent Test Results:   Lab Results (last 24 hours)     Procedure Component Value Units Date/Time    Blood Culture - Blood, Arm, Right [[id number]]  (Normal) Collected: 08/26/22 0658    Specimen: Blood from Arm, Right Updated: 08/31/22 0717     Blood Culture No growth at 5 days    Comprehensive Metabolic Panel [[id number]]  (Abnormal) Collected: 08/31/22 0533    Specimen: Blood Updated: 08/31/22 0627     Glucose 122 mg/dL      BUN 11 mg/dL      Creatinine 0.78 mg/dL      Sodium 138 mmol/L      Potassium 4.0 mmol/L      Chloride 101 mmol/L      CO2 28.0 mmol/L      Calcium 8.7 mg/dL      Total Protein 6.5 g/dL      Albumin 3.20 g/dL      ALT (SGPT) 12 U/L      AST (SGOT) 11 U/L      Alkaline Phosphatase 159 U/L      Total Bilirubin 0.4 mg/dL      Globulin 3.3 gm/dL      A/G Ratio 1.0 g/dL      BUN/Creatinine Ratio 14.1     Anion Gap 9.0 mmol/L      eGFR 101.1 mL/min/1.73      Comment: National Kidney Foundation and American Society of Nephrology (ASN) Task Force recommended calculation based on the Chronic Kidney Disease Epidemiology Collaboration (CKD-EPI) equation refit without adjustment for race.       Narrative:      GFR Normal >60  Chronic Kidney Disease <60  Kidney Failure <15      CBC & Differential [[id number]]   (Abnormal) Collected: 08/31/22 0533    Specimen: Blood Updated: 08/31/22 0607    Narrative:      The following orders were created for panel order CBC & Differential.  Procedure                               Abnormality         Status                     ---------                               -----------         ------                     CBC Auto Differential[[id number]]        Abnormal            Final result                 Please view results for these tests on the individual orders.    CBC Auto Differential [[id number]]  (Abnormal) Collected: 08/31/22 0533    Specimen: Blood Updated: 08/31/22 0607     WBC 9.37 10*3/mm3      RBC 3.26 10*6/mm3      Hemoglobin 9.1 g/dL      Hematocrit 28.2 %      MCV 86.5 fL      MCH 27.9 pg      MCHC 32.3 g/dL      RDW 14.1 %      RDW-SD 45.2 fl      MPV 11.1 fL      Platelets 261 10*3/mm3      Neutrophil % 75.4 %      Lymphocyte % 12.8 %      Monocyte % 8.3 %      Eosinophil % 2.1 %      Basophil % 0.3 %      Immature Grans % 1.1 %      Neutrophils, Absolute 7.06 10*3/mm3      Lymphocytes, Absolute 1.20 10*3/mm3      Monocytes, Absolute 0.78 10*3/mm3      Eosinophils, Absolute 0.20 10*3/mm3      Basophils, Absolute 0.03 10*3/mm3      Immature Grans, Absolute 0.10 10*3/mm3      nRBC 0.0 /100 WBC     Blood Culture - Blood, Arm, Left [[id number]]  (Normal) Collected: 08/26/22 0435    Specimen: Blood from Arm, Left Updated: 08/31/22 0449     Blood Culture No growth at 5 days    Urine Culture - Urine, Kidney, Left [[id number]]  (Abnormal)  (Susceptibility) Collected: 08/28/22 0750    Specimen: Urine from Kidney, Left Updated: 08/30/22 0955     Urine Culture 25,000 CFU/mL Escherichia coli    Narrative:      Colonization of the urinary tract without infection is common. Treatment is discouraged unless the patient is symptomatic, pregnant, or undergoing an invasive urologic procedure.    Susceptibility      Escherichia coli      RAHEEM      Ampicillin Susceptible      Ampicillin + Sulbactam  "Susceptible      Cefazolin Susceptible      Cefepime Susceptible      Ceftazidime Susceptible      Ceftriaxone Susceptible      Gentamicin Susceptible      Levofloxacin Resistant     Nitrofurantoin Susceptible      Piperacillin + Tazobactam Susceptible      Trimethoprim + Sulfamethoxazole Susceptible                    Linear View                         Imaging Results (Last 24 Hours)     ** No results found for the last 24 hours. **        Chief Complaint on Day of Discharge: No complaints    Hospital Course:   This is a 37-year-old female with past medical history of bipolar disorder, thyroid disease and tobacco abuse that presented to [facility] on 8/26/2022 with complaints of left flank pain.  CT of the abdomen and pelvis indicated severe left-sided hydronephrosis including severe pelviectasis with no significant ureterectasis.       The patient was admitted for urosepsis with acute left pyelonephritis.  Urology was consulted and the patient was started on IV Zosyn, antiemetics and pain control.  Cystoscopy was done by Dr. Abraham on 8/28/2022.  The patient was de-escalated to IV Rocephin and discharged with 5 additional days of Omnicef.  Patient was also noted to have an iron deficiency anemia and started on oral iron.  She will follow with her PCP and urologist in 1 week.    Condition on Discharge: Stable    Physical Exam on Discharge:  /78 (BP Location: Right arm, Patient Position: Sitting)   Pulse 83   Temp 98.9 °F (37.2 °C) (Oral)   Resp 18   Ht 167.6 cm (66\")   Wt 119 kg (261 lb 7 oz)   LMP 08/08/2022   SpO2 92%   BMI 42.20 kg/m²   Physical Exam  Constitutional:       Appearance: She is well-developed.   HENT:      Head: Normocephalic and atraumatic.   Eyes:      Pupils: Pupils are equal, round, and reactive to light.   Cardiovascular:      Rate and Rhythm: Normal rate and regular rhythm.   Pulmonary:      Effort: Pulmonary effort is normal.      Breath sounds: Normal breath " sounds.   Abdominal:      General: Bowel sounds are normal.      Palpations: Abdomen is soft.   Musculoskeletal:         General: Normal range of motion.      Cervical back: Normal range of motion and neck supple.   Skin:     General: Skin is warm and dry.   Neurological:      Mental Status: She is alert and oriented to person, place, and time.   Psychiatric:         Behavior: Behavior normal.     Discharge Disposition:  Home or Self Care    Discharge Medications:     Discharge Medications      New Medications      Instructions Start Date   cefdinir 300 MG capsule  Commonly known as: OMNICEF   300 mg, Oral, 2 Times Daily      ferrous sulfate 324 (65 Fe) MG tablet delayed-release EC tablet   324 mg, Oral, Daily With Breakfast   Start Date: September 1, 2022     ketorolac 10 MG tablet  Commonly known as: TORADOL   10 mg, Oral, Every 6 Hours PRN      oxyCODONE-acetaminophen 5-325 MG per tablet  Commonly known as: PERCOCET   1 tablet, Oral, Every 6 Hours PRN      polyethylene glycol 17 g packet  Commonly known as: MIRALAX   17 g, Oral, Daily   Start Date: September 1, 2022        Continue These Medications      Instructions Start Date   brompheniramine-pseudoephedrine-DM 30-2-10 MG/5ML syrup   5 mL, Oral, 4 Times Daily PRN             Discharge Diet:   Diet Instructions     Diet: Regular      Discharge Diet: Regular          Activity at Discharge:   Activity Instructions     Activity as Tolerated            Discharge Care Plan/Instructions: As above    Follow-up Appointment:   Follow-up Information     Provider, No Known .    Contact information:  [facility] [address]             Gregory Steen APRN Follow up in 1 week(s).    Specialty: Nurse Practitioner  Contact information:   [address]  [address]  [facility] [address] [phone]                           This document has been electronically signed by [facility] Duarte on August 31, 2022 09:34 CDT        Time: Greater than 30  minutes.

## 2022-09-01 ENCOUNTER — READMISSION MANAGEMENT (OUTPATIENT)
Dept: CALL CENTER | Facility: HOSPITAL | Age: 37
End: 2022-09-01

## 2022-09-01 NOTE — OUTREACH NOTE
Prep Survey    Flowsheet Row Responses   [facility] facility patient discharged from? [facility]   Is LACE score < 7 ? No   Emergency Room discharge w/ pulse ox? No   Eligibility Readm Mgmt   Discharge diagnosis urosepsis with acute left pyelonephritis, Pelvicaliectasis s/p CYSTOSCOPY LEFT RETROGRADE PYELOGRAM AND STENT INSERTION   Does the patient have one of the following disease processes/diagnoses(primary or secondary)? Sepsis   Does the patient have Home health ordered? No   Is there a DME ordered? No   Prep survey completed? Yes          CONSTANZA MENDOZA - Registered Nurse

## 2022-09-01 NOTE — PAYOR COMM NOTE
"Denise Motleymore  [facility]  Case Management Extender  [phone] phone  [phone] fax      Auth# [id number]    Mackenzie RIVAS (37 y.o. Female)             Date of Birth   1985    Social Security Number       Address   [address] [place] [address] [place] [address]    Home Phone   [phone]    MRN   [id number]       Tenriism   None    Marital Status   Single                            Admission Date   8/26/22    Admission Type   Emergency    Admitting Provider   Behroozi, Saeid, MD    Attending Provider       Department, Room/Bed   [address], 429/1       Discharge Date   8/31/2022    Discharge Disposition   Home or Self Care    Discharge Destination                               Attending Provider: (none)   Allergies: No Known Allergies    Isolation: None   Infection: None   Code Status: Prior   Advance Care Planning Activity    Ht: 167.6 cm (66\")   Wt: 119 kg (261 lb 7 oz)    Admission Cmt: None   Principal Problem: None                Active Insurance as of 8/26/2022     Primary Coverage     Payor Plan Insurance Group Employer/Plan Group    [employer] [place] [employer] [place]      Payor Plan Address Payor Plan Phone Number Payor Plan Fax Number Effective Dates    [address] [address] [phone]  12/24/2020 - None Entered    [facility] [id number]       Subscriber Name Subscriber Birth Date Member ID       MACKENZIE RIVAS 1985 [id number]                 Emergency Contacts      (Rel.) Home Phone Work Phone Mobile Phone    KATY GLASER (Significant Other) [phone] -- --               Discharge Summary      Amy Killian APRN at 08/31/22 0934     Attestation signed by Robbie Hester MD at 08/31/22 1337    I personally evaluated and examined the patient in conjunction with [facility] Murphy and agree with the assessment, treatment plan, and disposition of the patient as recorded.  Cardiovascular: Normal S1 and " S2.  Lungs: Clear.                    [facility] Medicine Services  DISCHARGE SUMMARY       Date of Admission: 8/26/2022  Date of Discharge:  8/31/2022  Primary Care Physician: Provider, No Known    Presenting Problem/History of Present Illness:  Pelvicaliectasis [N28.89]  Acute cystitis without hematuria [N30.00]     Final Discharge Diagnoses:  Active Hospital Problems    Diagnosis    • Pelvicaliectasis        Consults:   Consults     Date and Time Order Name Status Description    8/26/2022  7:27 AM Inpatient Urology Consult Completed     8/26/2022  6:10 AM Hospitalist (on-call MD unless specified)            Procedures Performed: Procedure(s):  CYSTOSCOPY LEFT RETROGRADE PYELOGRAM AND STENT INSERTION              Pertinent Test Results:   Lab Results (last 24 hours)     Procedure Component Value Units Date/Time    Blood Culture - Blood, Arm, Right [[id number]]  (Normal) Collected: 08/26/22 0658    Specimen: Blood from Arm, Right Updated: 08/31/22 0717     Blood Culture No growth at 5 days    Comprehensive Metabolic Panel [[id number]]  (Abnormal) Collected: 08/31/22 0533    Specimen: Blood Updated: 08/31/22 0627     Glucose 122 mg/dL      BUN 11 mg/dL      Creatinine 0.78 mg/dL      Sodium 138 mmol/L      Potassium 4.0 mmol/L      Chloride 101 mmol/L      CO2 28.0 mmol/L      Calcium 8.7 mg/dL      Total Protein 6.5 g/dL      Albumin 3.20 g/dL      ALT (SGPT) 12 U/L      AST (SGOT) 11 U/L      Alkaline Phosphatase 159 U/L      Total Bilirubin 0.4 mg/dL      Globulin 3.3 gm/dL      A/G Ratio 1.0 g/dL      BUN/Creatinine Ratio 14.1     Anion Gap 9.0 mmol/L      eGFR 101.1 mL/min/1.73      Comment: National Kidney Foundation and American Society of Nephrology (ASN) Task Force recommended calculation based on the Chronic Kidney Disease Epidemiology Collaboration (CKD-EPI) equation refit without adjustment for race.       Narrative:      GFR Normal >60  Chronic Kidney Disease <60  Kidney Failure <15      CBC &  Differential [[id number]]  (Abnormal) Collected: 08/31/22 0533    Specimen: Blood Updated: 08/31/22 0607    Narrative:      The following orders were created for panel order CBC & Differential.  Procedure                               Abnormality         Status                     ---------                               -----------         ------                     CBC Auto Differential[[id number]]        Abnormal            Final result                 Please view results for these tests on the individual orders.    CBC Auto Differential [[id number]]  (Abnormal) Collected: 08/31/22 0533    Specimen: Blood Updated: 08/31/22 0607     WBC 9.37 10*3/mm3      RBC 3.26 10*6/mm3      Hemoglobin 9.1 g/dL      Hematocrit 28.2 %      MCV 86.5 fL      MCH 27.9 pg      MCHC 32.3 g/dL      RDW 14.1 %      RDW-SD 45.2 fl      MPV 11.1 fL      Platelets 261 10*3/mm3      Neutrophil % 75.4 %      Lymphocyte % 12.8 %      Monocyte % 8.3 %      Eosinophil % 2.1 %      Basophil % 0.3 %      Immature Grans % 1.1 %      Neutrophils, Absolute 7.06 10*3/mm3      Lymphocytes, Absolute 1.20 10*3/mm3      Monocytes, Absolute 0.78 10*3/mm3      Eosinophils, Absolute 0.20 10*3/mm3      Basophils, Absolute 0.03 10*3/mm3      Immature Grans, Absolute 0.10 10*3/mm3      nRBC 0.0 /100 WBC     Blood Culture - Blood, Arm, Left [[id number]]  (Normal) Collected: 08/26/22 0435    Specimen: Blood from Arm, Left Updated: 08/31/22 0449     Blood Culture No growth at 5 days    Urine Culture - Urine, Kidney, Left [[id number]]  (Abnormal)  (Susceptibility) Collected: 08/28/22 0750    Specimen: Urine from Kidney, Left Updated: 08/30/22 0955     Urine Culture 25,000 CFU/mL Escherichia coli    Narrative:      Colonization of the urinary tract without infection is common. Treatment is discouraged unless the patient is symptomatic, pregnant, or undergoing an invasive urologic procedure.    Susceptibility      Escherichia coli      RAHEEM      Ampicillin Susceptible      " Ampicillin + Sulbactam Susceptible      Cefazolin Susceptible      Cefepime Susceptible      Ceftazidime Susceptible      Ceftriaxone Susceptible      Gentamicin Susceptible      Levofloxacin Resistant     Nitrofurantoin Susceptible      Piperacillin + Tazobactam Susceptible      Trimethoprim + Sulfamethoxazole Susceptible                    Linear View                         Imaging Results (Last 24 Hours)     ** No results found for the last 24 hours. **        Chief Complaint on Day of Discharge: No complaints    Hospital Course:   This is a 37-year-old female with past medical history of bipolar disorder, thyroid disease and tobacco abuse that presented to [facility] on [facility]/2022 with complaints of left flank pain.  CT of the abdomen and pelvis indicated severe left-sided hydronephrosis including severe pelviectasis with no significant ureterectasis.       The patient was admitted for urosepsis with acute left pyelonephritis.  Urology was consulted and the patient was started on IV Zosyn, antiemetics and pain control.  Cystoscopy was done by Dr. Abraham on 8/28/2022.  The patient was de-escalated to IV Rocephin and discharged with 5 additional days of Omnicef.  Patient was also noted to have an iron deficiency anemia and started on oral iron.  She will follow with her PCP and urologist in 1 week.    Condition on Discharge: Stable    Physical Exam on Discharge:  /78 (BP Location: Right arm, Patient Position: Sitting)   Pulse 83   Temp 98.9 °F (37.2 °C) (Oral)   Resp 18   Ht 167.6 cm (66\")   Wt 119 kg (261 lb 7 oz)   LMP 08/08/2022   SpO2 92%   BMI 42.20 kg/m²   Physical Exam  Constitutional:       Appearance: She is well-developed.   HENT:      Head: Normocephalic and atraumatic.   Eyes:      Pupils: Pupils are equal, round, and reactive to light.   Cardiovascular:      Rate and Rhythm: Normal rate and regular rhythm.   Pulmonary:      Effort: Pulmonary effort is normal.      Breath " sounds: Normal breath sounds.   Abdominal:      General: Bowel sounds are normal.      Palpations: Abdomen is soft.   Musculoskeletal:         General: Normal range of motion.      Cervical back: Normal range of motion and neck supple.   Skin:     General: Skin is warm and dry.   Neurological:      Mental Status: She is alert and oriented to person, place, and time.   Psychiatric:         Behavior: Behavior normal.     Discharge Disposition:  Home or Self Care    Discharge Medications:     Discharge Medications      New Medications      Instructions Start Date   cefdinir 300 MG capsule  Commonly known as: OMNICEF   300 mg, Oral, 2 Times Daily      ferrous sulfate 324 (65 Fe) MG tablet delayed-release EC tablet   324 mg, Oral, Daily With Breakfast   Start Date: September 1, 2022     ketorolac 10 MG tablet  Commonly known as: TORADOL   10 mg, Oral, Every 6 Hours PRN      oxyCODONE-acetaminophen 5-325 MG per tablet  Commonly known as: PERCOCET   1 tablet, Oral, Every 6 Hours PRN      polyethylene glycol 17 g packet  Commonly known as: MIRALAX   17 g, Oral, Daily   Start Date: September 1, 2022        Continue These Medications      Instructions Start Date   brompheniramine-pseudoephedrine-DM 30-2-10 MG/5ML syrup   5 mL, Oral, 4 Times Daily PRN             Discharge Diet:   Diet Instructions     Diet: Regular      Discharge Diet: Regular          Activity at Discharge:   Activity Instructions     Activity as Tolerated            Discharge Care Plan/Instructions: As above    Follow-up Appointment:   Follow-up Information     Provider, No Known .    Contact information:  [place] [address]             Gregory Steen APRN Follow up in 1 week(s).    Specialty: Nurse Practitioner  Contact information:  [address]  [address]  [place] [id number] [phone]                           This document has been electronically signed by [facility] Duarte on August 31, 2022 09:34 CDT        Time:  Greater than 30 minutes.                Electronically signed by Robbie Hester MD at 08/31/22 4400

## 2022-09-06 ENCOUNTER — READMISSION MANAGEMENT (OUTPATIENT)
Dept: CALL CENTER | Facility: HOSPITAL | Age: 37
End: 2022-09-06

## 2022-09-06 NOTE — OUTREACH NOTE
Sepsis Week 1 Survey    Flowsheet Row Responses   [facility] patient discharged from? [facility]   Does the patient have one of the following disease processes/diagnoses(primary or secondary)? Sepsis   Week 1 attempt successful? Yes   Call start time 1037   Call end time 1038   Discharge diagnosis urosepsis with acute left pyelonephritis, Pelvicaliectasis s/p CYSTOSCOPY LEFT RETROGRADE PYELOGRAM AND STENT INSERTION   Meds reviewed with patient/caregiver? Yes   Is the patient having any side effects they believe may be caused by any medication additions or changes? No   Does the patient have all medications related to this admission filled (includes all antibiotics, inhalers, nebulizers,steroids,etc.) Yes   Is the patient taking all medications as directed (includes completed medication regime)? Yes   Does the patient have a primary care provider?  Yes   Does the patient have an appointment with their PCP within 7 days of discharge? No   What is preventing the patient from scheduling follow up appointments within 7 days of discharge? Haven't had time  [pt plans to reschedule the apts herself for a time that works best for her]   Has the patient kept scheduled appointments due by today? N/A   Psychosocial issues? No   Did the patient receive a copy of their discharge instructions? Yes   Nursing interventions Reviewed instructions with patient   What is the patient's perception of their health status since discharge? Improving   Nursing interventions Nurse provided patient education   Is the patient/caregiver able to teach back TIME? I nfection - may have signs and symptoms of an infection, T emperature - higher or lower than normal, E xtremely Ill - severe pain, discomfort, shortness of breath   Nursing interventions Nurse provided reassurance to patient   Is patient/caregiver able to teach back steps to recovery at home? Set small, achievable goals for return to baseline health, Rest and regain strength   Is  the patient/caregiver able to teach back signs and symptoms of worsening condition: Fever, Rapid heart rate (>90), Shortness of breath/rapid respiratory rate   If the patient is a current smoker, are they able to teach back resources for cessation? [id number]   Is the patient/caregiver able to teach back the hierarchy of who to call/visit for symptoms/problems? PCP, Specialist, Home health nurse, Urgent Care, ED, 911 Yes   Week 1 call completed? Yes          DEMETRIA H - Registered Nurse

## 2022-10-25 ENCOUNTER — LAB (OUTPATIENT)
Dept: LAB | Facility: HOSPITAL | Age: 37
End: 2022-10-25

## 2022-10-25 ENCOUNTER — OFFICE VISIT (OUTPATIENT)
Dept: FAMILY MEDICINE CLINIC | Facility: CLINIC | Age: 37
End: 2022-10-25

## 2022-10-25 VITALS
HEIGHT: 66 IN | HEART RATE: 92 BPM | SYSTOLIC BLOOD PRESSURE: 118 MMHG | WEIGHT: 268 LBS | DIASTOLIC BLOOD PRESSURE: 76 MMHG | TEMPERATURE: 98.2 F | OXYGEN SATURATION: 98 % | BODY MASS INDEX: 43.07 KG/M2

## 2022-10-25 DIAGNOSIS — K76.0 FATTY LIVER DISEASE, NONALCOHOLIC: Primary | ICD-10-CM

## 2022-10-25 DIAGNOSIS — Z20.822 SUSPECTED COVID-19 VIRUS INFECTION: ICD-10-CM

## 2022-10-25 DIAGNOSIS — Z46.6 ENCOUNTER FOR REMOVAL OF URETERAL STENT: ICD-10-CM

## 2022-10-25 DIAGNOSIS — D50.8 IRON DEFICIENCY ANEMIA SECONDARY TO INADEQUATE DIETARY IRON INTAKE: ICD-10-CM

## 2022-10-25 LAB — SARS-COV-2 N GENE RESP QL NAA+PROBE: NOT DETECTED

## 2022-10-25 PROCEDURE — 99213 OFFICE O/P EST LOW 20 MIN: CPT

## 2022-10-25 PROCEDURE — C9803 HOPD COVID-19 SPEC COLLECT: HCPCS

## 2022-10-25 PROCEDURE — 87635 SARS-COV-2 COVID-19 AMP PRB: CPT

## 2022-10-25 RX ORDER — FERROUS SULFATE TAB EC 324 MG (65 MG FE EQUIVALENT) 324 (65 FE) MG
324 TABLET DELAYED RESPONSE ORAL
Qty: 30 TABLET | Refills: 3 | Status: SHIPPED | OUTPATIENT
Start: 2022-10-25

## 2022-10-25 RX ORDER — POLYETHYLENE GLYCOL 3350 17 G/17G
POWDER, FOR SOLUTION ORAL
COMMUNITY
Start: 2022-08-31

## 2022-10-25 NOTE — PROGRESS NOTES
Family Medicine Residency  Roya Daly MD    Subjective:     Mackenzie Mack is a 37 y.o. female who presents to the clinic to establish care and get a referral for ureteral stent removal. Also would like to get a Covid test and medication for weight loss.     PHQ-2    The following portions of the patient's history were reviewed and updated as appropriate: allergies, current medications, past family history, past medical history, past social history, past surgical history and problem list.    Past Medical Hx:  Past Medical History:   Diagnosis Date   • Acneiform eruption    • Amenorrhea    • Anxiety    • Bipolar disorder (HCC)    • Disease of thyroid gland    • History of incompetent cervix, currently pregnant    • Infectious viral hepatitis     HEPATITIS A   • RhD negative    • Smoker    • Tobacco dependence syndrome    • URI (upper respiratory infection)    • Urogenital trichomoniasis    • Varicella        Past Surgical Hx:  Past Surgical History:   Procedure Laterality Date   • CERVIX SURGERY  2005    Revision of cervix (2)    incompetent cervix, cerclage    • CYSTOSCOPY, RETROGRADE PYELOGRAM AND STENT INSERTION Left 8/28/2022    Procedure: CYSTOSCOPY LEFT RETROGRADE PYELOGRAM AND STENT INSERTION;  Surgeon: Lamesa, Popeye MENDOZA MD;  Location: [facility];  Service: Urology;  Laterality: Left;   • DILATATION AND CURETTAGE     • PAP SMEAR      benign cellular changes reactive cellular changes. negative for intraepithelial lesion or malignancy       Current Meds:    Current Outpatient Medications:   •  ferrous sulfate 324 (65 Fe) MG tablet delayed-release EC tablet, Take 1 tablet by mouth Daily With Breakfast., Disp: 30 tablet, Rfl: 3  •  brompheniramine-pseudoephedrine-DM 30-2-10 MG/5ML syrup, Take 5 mL by mouth 4 (Four) Times a Day As Needed for Congestion, Cough or Allergies., Disp: 120 mL, Rfl: 0  •  HM ClearLax 17 GM/SCOOP powder, , Disp: , Rfl:   •  ketorolac (TORADOL) 10 MG tablet, Take 1 tablet by mouth  "Every 6 (Six) Hours As Needed for Moderate Pain., Disp: 30 tablet, Rfl: 0    Allergies:  No Known Allergies    Family Hx:  Family History   Problem Relation Age of Onset   • Hypertension Mother    • Lung cancer Paternal Grandfather    • Bipolar disorder Other    • Diabetes Other    • Asthma Son    • No Known Problems Daughter    • Cancer Maternal Grandmother    • Diabetes Maternal Grandfather    • No Known Problems Son    • No Known Problems Son    • No Known Problems Daughter         Social History:  Social History     Socioeconomic History   • Marital status: Single   Tobacco Use   • Smoking status: Every Day     Packs/day: 0.50     Years: 17.00     Pack years: 8.50     Types: Cigarettes   • Smokeless tobacco: Never   Substance and Sexual Activity   • Alcohol use: No   • Drug use: Not Currently     Types: Marijuana   • Sexual activity: Yes     Partners: Male     Comment: LAST PAP SMEAR 2/12/18 NEGATIVE, HPV NEGATIVE        Review of Systems  Review of Systems   Constitutional: Negative for appetite change, chills, diaphoresis, fatigue, fever and unexpected weight change.   HENT: Negative for congestion and sore throat.    Eyes: Negative.    Respiratory: Positive for cough. Negative for shortness of breath.    Cardiovascular: Negative for chest pain.   Gastrointestinal: Negative for abdominal pain, constipation, diarrhea, nausea and vomiting.   Genitourinary: Negative for difficulty urinating and menstrual problem.       Objective:     /76   Pulse 92   Temp 98.2 °F (36.8 °C)   Ht 167.6 cm (66\")   Wt 122 kg (268 lb)   SpO2 98%   BMI 43.26 kg/m²   Physical Exam  Vitals reviewed.   Constitutional:       General: She is not in acute distress.     Appearance: Normal appearance.   Cardiovascular:      Rate and Rhythm: Normal rate and regular rhythm.      Pulses: Normal pulses.      Heart sounds: Normal heart sounds.   Pulmonary:      Effort: Pulmonary effort is normal.      Breath sounds: Normal breath " sounds.   Abdominal:      General: Bowel sounds are normal.      Palpations: Abdomen is soft.   Musculoskeletal:         General: Normal range of motion.   Skin:     General: Skin is warm and dry.   Neurological:      General: No focal deficit present.      Mental Status: She is alert and oriented to person, place, and time.   Psychiatric:         Mood and Affect: Mood normal.          Assessment/Plan:     Diagnoses and all orders for this visit:    1. Iron deficiency anemia secondary to inadequate dietary iron intake (Primary)  -     ferrous sulfate 324 (65 Fe) MG tablet delayed-release EC tablet; Take 1 tablet by mouth Daily With Breakfast.  Dispense: 30 tablet; Refill: 3    2. Suspected COVID-19 virus infection  -     COVID-19, [facility] IN-HOUSE, NP SWAB IN TRANSPORT MEDIA 8-10 HR TAT - Swab, Anterior nasal; Future    3. Encounter for removal of ureteral stent  -     Ambulatory Referral to Urology    pt wants a Covid test done. Never been vaccinated.   Had a stent placed in Aug 2022 and now needs a ref to get stent removed. Wants medication for weight loss. Send [employer] a message to do a PA for ozempic. Last ALP was elevated (159).     · Rx changes: n/a  · Patient Education: see a/p  · Compliance at present is estimated to be good.   · Efforts to improve compliance (if necessary) will be directed at increased exercise.    Depression screening: Up to date; last screen 10/25/2022     Follow-up:     Return in about 3 months (around 1/25/2023).    Preventative:  Health Maintenance   Topic Date Due   • COVID-19 Vaccine (1) Never done   • Pneumococcal Vaccine 0-64 (1 - PCV) Never done   • TDAP/TD VACCINES (2 - Tdap) 05/31/2011   • ANNUAL PHYSICAL  11/29/2019   • PAP SMEAR  02/12/2021   • INFLUENZA VACCINE  08/01/2022   • HEPATITIS C SCREENING  Completed     Female Preventative: PAP is due N/A  Last PAP was n/a  Recommended: none  Vaccine Counseling: N/A    Weight  -Class: Obese Class III extreme obesity: > or equal to  40kg/m2  -Class 3 Severe Obesity (BMI >=40). Obesity-related health conditions include the following: none and obstructive sleep apnea. Obesity is newly identified. BMI is is above average; no BMI management plan is appropriate. We discussed portion control and increasing exercise.   increase physical activity    Alcohol use:  reports no history of alcohol use.  Nicotine status  reports that she has been smoking cigarettes. She has a 8.50 pack-year smoking history. She has never used smokeless tobacco.     Goals    None         RISK SCORE: 3            This document has been electronically signed by Roya Daly MD on October 25, 2022 13:18 CDT

## 2022-10-26 ENCOUNTER — TELEPHONE (OUTPATIENT)
Dept: FAMILY MEDICINE CLINIC | Facility: CLINIC | Age: 37
End: 2022-10-26

## 2022-10-26 NOTE — TELEPHONE ENCOUNTER
Patient called returning Dr Riley call again. Patient was very upset that I could not connect her to the doctor. I explained doctor was already gone for the day.    Her#[phone]

## 2022-10-26 NOTE — TELEPHONE ENCOUNTER
Called pt to discuss covid 19 results. No answer.             This document has been electronically signed by Roya Daly MD on October 26, 2022 11:08 CDT

## 2022-10-27 NOTE — TELEPHONE ENCOUNTER
Called pt back to discuss covid results and she did not answer. If she calls back please ask her to sign up for [facility] and I will send her a message. Thank you.

## 2022-10-28 ENCOUNTER — TELEPHONE (OUTPATIENT)
Dept: FAMILY MEDICINE CLINIC | Facility: CLINIC | Age: 37
End: 2022-10-28

## 2022-10-28 DIAGNOSIS — K76.0 FATTY METAMORPHOSIS OF LIVER: Primary | ICD-10-CM

## 2022-10-28 NOTE — TELEPHONE ENCOUNTER
Patient called returning Dr Riley call. She said she has been trying to get a hold of her for a few days.    Her#[phone]

## 2022-10-28 NOTE — TELEPHONE ENCOUNTER
Spoke to pt. Ozempic got approved. She will pick it up from the pharmacy and then come into the office to get it administered.

## 2022-10-31 NOTE — PROGRESS NOTES
"                                                                                                                                                        Subjective:     Mackenzie Mack is a 37 y.o. female who presents for establish care, referral to urology, and weight management.    Pt reports having a ureteral stent placed end of August due to pelvicaliectasis. She is requesting a referral to urology for stent removal. Denies fever, chills, dysuria, urgency, etc. She reports previously being on \"weight loss pills\" and would like to discuss options.      Past Medical Hx:  Past Medical History:   Diagnosis Date   • Acneiform eruption    • Amenorrhea    • Anxiety    • Bipolar disorder (HCC)    • Disease of thyroid gland    • History of incompetent cervix, currently pregnant    • Infectious viral hepatitis     HEPATITIS A   • RhD negative    • Smoker    • Tobacco dependence syndrome    • URI (upper respiratory infection)    • Urogenital trichomoniasis    • Varicella            Past Surgical Hx:  Past Surgical History:   Procedure Laterality Date   • CERVIX SURGERY  2005    Revision of cervix (2)    incompetent cervix, cerclage    • CYSTOSCOPY, RETROGRADE PYELOGRAM AND STENT INSERTION Left 8/28/2022    Procedure: CYSTOSCOPY LEFT RETROGRADE PYELOGRAM AND STENT INSERTION;  Surgeon: Eden, Popeye MENDOZA MD;  Location: [facility];  Service: Urology;  Laterality: Left;   • DILATATION AND CURETTAGE     • PAP SMEAR      benign cellular changes reactive cellular changes. negative for intraepithelial lesion or malignancy       Health Maintenance:  Health Maintenance   Topic Date Due   • COVID-19 Vaccine (1) Never done   • Pneumococcal Vaccine 0-64 (1 - PCV) Never done   • TDAP/TD VACCINES (2 - Tdap) 05/31/2011   • ANNUAL PHYSICAL  11/29/2019   • PAP SMEAR  02/12/2021   • INFLUENZA VACCINE  08/01/2022   • HEPATITIS C SCREENING  Completed       Current Meds:    Current Outpatient Medications:   •  ferrous sulfate 324 (65 Fe) MG " "tablet delayed-release EC tablet, Take 1 tablet by mouth Daily With Breakfast., Disp: 30 tablet, Rfl: 3  •  brompheniramine-pseudoephedrine-DM 30-2-10 MG/5ML syrup, Take 5 mL by mouth 4 (Four) Times a Day As Needed for Congestion, Cough or Allergies., Disp: 120 mL, Rfl: 0  •  HM ClearLax 17 GM/SCOOP powder, , Disp: , Rfl:   •  ketorolac (TORADOL) 10 MG tablet, Take 1 tablet by mouth Every 6 (Six) Hours As Needed for Moderate Pain., Disp: 30 tablet, Rfl: 0  •  Semaglutide,0.25 or 0.5MG/DOS, (OZEMPIC) 2 MG/1.5ML solution pen-injector, Inject 0.25 mg under the skin into the appropriate area as directed 1 (One) Time Per Week., Disp: 1.5 mL, Rfl: 0    Allergies:  Patient has no known allergies.    Family Hx:  Family History   Problem Relation Age of Onset   • Hypertension Mother    • Lung cancer Paternal Grandfather    • Bipolar disorder Other    • Diabetes Other    • Asthma Son    • No Known Problems Daughter    • Cancer Maternal Grandmother    • Diabetes Maternal Grandfather    • No Known Problems Son    • No Known Problems Son    • No Known Problems Daughter         Social History:  Social History     Socioeconomic History   • Marital status: Single   Tobacco Use   • Smoking status: Every Day     Packs/day: 0.50     Years: 17.00     Pack years: 8.50     Types: Cigarettes   • Smokeless tobacco: Never   Substance and Sexual Activity   • Alcohol use: No   • Drug use: Not Currently     Types: Marijuana   • Sexual activity: Yes     Partners: Male     Comment: LAST PAP SMEAR 2/12/18 NEGATIVE, HPV NEGATIVE        Review of Systems  Review of Systems   Constitutional: Negative for chills and fever.   Gastrointestinal: Negative for abdominal pain, nausea and vomiting.   Genitourinary: Negative for dysuria, flank pain and urgency.       Objective:     /76   Pulse 92   Temp 98.2 °F (36.8 °C)   Ht 167.6 cm (66\")   Wt 122 kg (268 lb)   SpO2 98%   BMI 43.26 kg/m²   Physical Exam  Vitals and nursing note reviewed. "   Constitutional:       General: She is not in acute distress.     Appearance: Normal appearance. She is well-developed. She is not diaphoretic.   HENT:      Head: Normocephalic and atraumatic.      Right Ear: Hearing normal.      Left Ear: Hearing normal.   Eyes:      General: Lids are normal.      Conjunctiva/sclera: Conjunctivae normal.   Pulmonary:      Effort: Pulmonary effort is normal. No respiratory distress.   Abdominal:      General: There is no distension.   Musculoskeletal:         General: No tenderness or deformity. Normal range of motion.      Right lower leg: No edema.      Left lower leg: No edema.   Skin:     General: Skin is warm and dry.      Coloration: Skin is not pale.      Findings: No erythema or rash.   Neurological:      Mental Status: She is alert and oriented to person, place, and time. She is not disoriented.          Assessment:     Diagnoses and all orders for this visit:    1. Fatty liver disease, nonalcoholic (Primary)    2. Encounter for removal of ureteral stent  -     Ambulatory Referral to Urology    3. Suspected COVID-19 virus infection  -     COVID-19, [facility] IN-HOUSE, NP SWAB IN TRANSPORT MEDIA 8-10 HR TAT - Swab, Anterior nasal; Future  -     COVID-19, [facility] IN-HOUSE, NP SWAB IN TRANSPORT MEDIA 8-10 HR TAT - Swab, Anterior nasal    4. Iron deficiency anemia secondary to inadequate dietary iron intake  -     ferrous sulfate 324 (65 Fe) MG tablet delayed-release EC tablet; Take 1 tablet by mouth Daily With Breakfast.  Dispense: 30 tablet; Refill: 3    Elevated alk phos at 159 indicative of fatty liver disease. Will start Ozempic 0.25mg and titrate to maximum tolerated dose. Refer to urology for stent removal.    Please see the resident's documentation for further details.     Plan:     I have seen and examined the patient.  I have reviewed the notes, assessments, and/or procedures performed by Roya Daly MD, I concur with her/his documentation and assessment and plan for  Mackenize Mack.        This document has been electronically signed by Rey Selby MD on October 31, 2022 10:06 CDT

## 2022-11-21 DIAGNOSIS — K76.0 FATTY METAMORPHOSIS OF LIVER: Primary | ICD-10-CM

## 2022-11-21 DIAGNOSIS — K76.0 FATTY LIVER DISEASE, NONALCOHOLIC: ICD-10-CM

## (undated) DEVICE — STERILE POLYISOPRENE POWDER-FREE SURGICAL GLOVES WITH EMOLLIENT COATING: Brand: PROTEXIS

## (undated) DEVICE — CATH URETRL OPN/END 5F70CM

## (undated) DEVICE — STERILE POLYISOPRENE POWDER-FREE SURGICAL GLOVES: Brand: PROTEXIS

## (undated) DEVICE — CONTAINER,SPECIMEN,OR STERILE,4OZ: Brand: MEDLINE

## (undated) DEVICE — SOL IRR H2O BTL 1000ML STRL

## (undated) DEVICE — NITINOL WIRE WITH HYDROPHILIC TIP: Brand: SENSOR

## (undated) DEVICE — SOL IRR NACL 0.9PCT 3000ML

## (undated) DEVICE — SOL IRRG H2O PL/BG 1000ML STRL

## (undated) DEVICE — PK CYSTO LF 60

## (undated) DEVICE — URETERAL STENT
Type: IMPLANTABLE DEVICE | Site: URETER | Status: NON-FUNCTIONAL
Brand: POLARIS™ ULTRA
Removed: 2022-08-28